# Patient Record
Sex: FEMALE | Race: BLACK OR AFRICAN AMERICAN | Employment: UNEMPLOYED | URBAN - METROPOLITAN AREA
[De-identification: names, ages, dates, MRNs, and addresses within clinical notes are randomized per-mention and may not be internally consistent; named-entity substitution may affect disease eponyms.]

---

## 2019-04-03 ENCOUNTER — HOSPITAL ENCOUNTER (EMERGENCY)
Age: 54
Discharge: HOME | End: 2019-04-03
Attending: EMERGENCY MEDICINE
Payer: MEDICARE

## 2019-04-03 ENCOUNTER — APPOINTMENT (OUTPATIENT)
Dept: GENERAL RADIOLOGY | Age: 54
End: 2019-04-03
Attending: EMERGENCY MEDICINE
Payer: MEDICARE

## 2019-04-03 VITALS
HEIGHT: 66 IN | DIASTOLIC BLOOD PRESSURE: 68 MMHG | OXYGEN SATURATION: 98 % | WEIGHT: 194 LBS | HEART RATE: 79 BPM | SYSTOLIC BLOOD PRESSURE: 126 MMHG | TEMPERATURE: 97.9 F | BODY MASS INDEX: 31.18 KG/M2 | RESPIRATION RATE: 20 BRPM

## 2019-04-03 DIAGNOSIS — L03.115 CELLULITIS OF RIGHT LOWER EXTREMITY: ICD-10-CM

## 2019-04-03 DIAGNOSIS — M79.671 RIGHT FOOT PAIN: Primary | ICD-10-CM

## 2019-04-03 PROCEDURE — 73630 X-RAY EXAM OF FOOT: CPT

## 2019-04-03 PROCEDURE — 99282 EMERGENCY DEPT VISIT SF MDM: CPT

## 2019-04-03 RX ORDER — FUROSEMIDE 40 MG/1
40 TABLET ORAL DAILY
Status: ON HOLD | COMMUNITY
End: 2021-11-19 | Stop reason: SDUPTHER

## 2019-04-03 RX ORDER — PREDNISONE 10 MG/1
10 TABLET ORAL DAILY
Status: ON HOLD | COMMUNITY
End: 2021-04-19

## 2019-04-03 RX ORDER — HYDROXYCHLOROQUINE SULFATE 200 MG/1
200 TABLET, FILM COATED ORAL 2 TIMES DAILY
Status: ON HOLD | COMMUNITY
End: 2021-11-07

## 2019-04-03 RX ORDER — TADALAFIL 20 MG/1
20 TABLET ORAL DAILY
Status: ON HOLD | COMMUNITY
End: 2021-04-19

## 2019-04-03 RX ORDER — CEPHALEXIN 500 MG/1
500 CAPSULE ORAL 3 TIMES DAILY
Qty: 21 CAP | Refills: 0 | Status: SHIPPED | OUTPATIENT
Start: 2019-04-03 | End: 2019-04-10

## 2019-04-04 NOTE — DISCHARGE INSTRUCTIONS
Patient Education        Foot Pain: Care Instructions  Your Care Instructions  Foot injuries that cause pain and swelling are fairly common. Almost all sports or home repair projects can cause a misstep that ends up as foot pain. Normal wear and tear, especially as you get older, also can cause foot pain. Most minor foot injuries will heal on their own, and home treatment is usually all you need to do. If you have a severe injury, you may need tests and treatment. Follow-up care is a key part of your treatment and safety. Be sure to make and go to all appointments, and call your doctor if you are having problems. It's also a good idea to know your test results and keep a list of the medicines you take. How can you care for yourself at home? · Take pain medicines exactly as directed. ? If the doctor gave you a prescription medicine for pain, take it as prescribed. ? If you are not taking a prescription pain medicine, ask your doctor if you can take an over-the-counter medicine. · Rest and protect your foot. Take a break from any activity that may cause pain. · Put ice or a cold pack on your foot for 10 to 20 minutes at a time. Put a thin cloth between the ice and your skin. · Prop up the sore foot on a pillow when you ice it or anytime you sit or lie down during the next 3 days. Try to keep it above the level of your heart. This will help reduce swelling. · Your doctor may recommend that you wrap your foot with an elastic bandage. Keep your foot wrapped for as long as your doctor advises. · If your doctor recommends crutches, use them as directed. · Wear roomy footwear. · As soon as pain and swelling end, begin gentle exercises of your foot. Your doctor can tell you which exercises will help. When should you call for help? Call 911 anytime you think you may need emergency care.  For example, call if:    · Your foot turns pale, white, blue, or cold.    Call your doctor now or seek immediate medical care if:    · You cannot move or stand on your foot.     · Your foot looks twisted or out of its normal position.     · Your foot is not stable when you step down.     · You have signs of infection, such as:  ? Increased pain, swelling, warmth, or redness. ? Red streaks leading from the sore area. ? Pus draining from a place on your foot. ? A fever.     · Your foot is numb or tingly.    Watch closely for changes in your health, and be sure to contact your doctor if:    · You do not get better as expected.     · You have bruises from an injury that last longer than 2 weeks. Where can you learn more? Go to http://landon-flower.info/. Enter G966 in the search box to learn more about \"Foot Pain: Care Instructions. \"  Current as of: September 20, 2018  Content Version: 11.9  © 7898-8740 [employer] Care instructions adapted under license by [employer] (which disclaims liability or warranty for this information). If you have questions about a medical condition or this instruction, always ask your healthcare professional. [place] [address] any warranty or liability for your use of this information. Patient Education        Cellulitis: Care Instructions  Your Care Instructions    Cellulitis is a skin infection caused by bacteria, most often strep or staph. It often occurs after a break in the skin from a scrape, cut, bite, or puncture, or after a rash. Cellulitis may be treated without doing tests to find out what caused it. But your doctor may do tests, if needed, to look for a specific bacteria, like methicillin-resistant Staphylococcus aureus (MRSA). The doctor has checked you carefully, but problems can develop later. If you notice any problems or new symptoms, get medical treatment right away. Follow-up care is a key part of your treatment and safety. Be sure to make and go to all appointments, and call your doctor if you are having problems.  It's also a good idea to know your test results and keep a list of the medicines you take. How can you care for yourself at home? · Take your antibiotics as directed. Do not stop taking them just because you feel better. You need to take the full course of antibiotics. · Prop up the infected area on pillows to reduce pain and swelling. Try to keep the area above the level of your heart as often as you can. · If your doctor told you how to care for your wound, follow your doctor's instructions. If you did not get instructions, follow this general advice:  ? Wash the wound with clean water 2 times a day. Don't use hydrogen peroxide or alcohol, which can slow healing. ? You may cover the wound with a thin layer of petroleum jelly, such as Vaseline, and a nonstick bandage. ? Apply more petroleum jelly and replace the bandage as needed. · Be safe with medicines. Take pain medicines exactly as directed. ? If the doctor gave you a prescription medicine for pain, take it as prescribed. ? If you are not taking a prescription pain medicine, ask your doctor if you can take an over-the-counter medicine. To prevent cellulitis in the future  · Try to prevent cuts, scrapes, or other injuries to your skin. Cellulitis most often occurs where there is a break in the skin. · If you get a scrape, cut, mild burn, or bite, wash the wound with clean water as soon as you can to help avoid infection. Don't use hydrogen peroxide or alcohol, which can slow healing. · If you have swelling in your legs (edema), support stockings and good skin care may help prevent leg sores and cellulitis. · Take care of your feet, especially if you have diabetes or other conditions that increase the risk of infection. Wear shoes and socks. Do not go barefoot. If you have athlete's foot or other skin problems on your feet, talk to your doctor about how to treat them. When should you call for help?   Call your doctor now or seek immediate medical care if:    · You have signs that your infection is getting worse, such as:  ? Increased pain, swelling, warmth, or redness. ? Red streaks leading from the area. ? Pus draining from the area. ? A fever.     · You get a rash.    Watch closely for changes in your health, and be sure to contact your doctor if:    · You do not get better as expected. Where can you learn more? Go to http://landon-flower.info/. Jorge Lagunas in the search box to learn more about \"Cellulitis: Care Instructions. \"  Current as of: April 17, 2018  Content Version: 11.9  © 1443-1560 "[employer]. Care instructions adapted under license by [employer] (which disclaims liability or warranty for this information). If you have questions about a medical condition or this instruction, always ask your healthcare professional. [address] any warranty or liability for your use of this information.

## 2019-04-04 NOTE — ED PROVIDER NOTES
Hx pulmonary HTN, SLE; presents with a one week hx right foot pain, redness, and swelling; no injury; no hx same; pain is moderate; worse with weight-bearing. No other complaints. No hx gout. Visiting from [place]. Past Medical History:   Diagnosis Date    Autoimmune disease ([facility] [address]     Lupus    Hypertension     \"pulmonary HTN\"    Ill-defined condition        Past Surgical History:   Procedure Laterality Date    HX ORTHOPAEDIC      left ankle         History reviewed. No pertinent family history. Social History     Socioeconomic History    Marital status: SINGLE     Spouse name: Not on file    Number of children: Not on file    Years of education: Not on file    Highest education level: Not on file   Occupational History    Not on file   Social Needs    Financial resource strain: Not on file    Food insecurity:     Worry: Not on file     Inability: Not on file    Transportation needs:     Medical: Not on file     Non-medical: Not on file   Tobacco Use    Smoking status: Never Smoker   Substance and Sexual Activity    Alcohol use: Never     Frequency: Never    Drug use: Never    Sexual activity: Not on file   Lifestyle    Physical activity:     Days per week: Not on file     Minutes per session: Not on file    Stress: Not on file   Relationships    Social connections:     Talks on phone: Not on file     Gets together: Not on file     Attends Rastafarian service: Not on file     Active member of club or organization: Not on file     Attends meetings of clubs or organizations: Not on file     Relationship status: Not on file    Intimate partner violence:     Fear of current or ex partner: Not on file     Emotionally abused: Not on file     Physically abused: Not on file     Forced sexual activity: Not on file   Other Topics Concern    Not on file   Social History Narrative    Not on file         ALLERGIES: Lotrel [amlodipine-benazepril]; Sudafed [pseudoephedrine hcl];  Codeine; and Doxycycline    Review of Systems   All other systems reviewed and are negative. Vitals:    04/03/19 2012   BP: 126/68   Pulse: 79   Resp: 20   Temp: 97.9 °F (36.6 °C)   SpO2: 98%   Weight: 88 kg (194 lb)   Height: 5' 6\" (1.676 m)            Physical Exam   Constitutional: She appears well-developed and well-nourished. HENT:   Head: Normocephalic and atraumatic. Eyes: Conjunctivae are normal.   Neck: No tracheal deviation present. Cardiovascular: Normal rate. Pulmonary/Chest: Effort normal.   Abdominal: She exhibits no distension. Musculoskeletal:   Right dorsal midfoot is red, swollen and tender; 2+ DP. Neurological: She is alert. Skin: Skin is dry. Psychiatric: She has a normal mood and affect. Nursing note and vitals reviewed. MDM       Procedures    Progress Note:  Results, treatment, and follow up plan have been discussed with patient. Questions were answered. Naya Pavon MD      A/P: right foot swelling, redness, tenderness - unclear etiology; will cover for cellulitis; PCP f/u.   Naya Pavon MD

## 2019-04-04 NOTE — ED TRIAGE NOTES
Pt ambulated to the treatment area with a steady gait. Pt states \"Thursday I started having pain in my right foot. No injury to the foot. Its swollen and warm. I drove here Saturday it was swollen before the car ride. \"

## 2019-04-04 NOTE — ED NOTES
Discharge note: The patient was discharged home in stable condition, accompanied by family member. The patient is alert and oriented, is in no respiratory distress and has vital signs within normal limits. The patient's diagnosis, condition and treatment were explained to patient Dr Karyle Hailey. The patient expressed understanding of discharge instructions, prescriptions, and plan of care. A work note was given to pt. A discharge plan has been developed. A  was not involved in the process. Patient offered a wheelchair to ED lob for discharge but declined at this time. Patient ambulated with a steady gate to ED lobby to go home with family member.

## 2021-04-18 ENCOUNTER — APPOINTMENT (OUTPATIENT)
Dept: CT IMAGING | Age: 56
DRG: 392 | End: 2021-04-18
Attending: STUDENT IN AN ORGANIZED HEALTH CARE EDUCATION/TRAINING PROGRAM
Payer: MEDICARE

## 2021-04-18 ENCOUNTER — APPOINTMENT (OUTPATIENT)
Dept: GENERAL RADIOLOGY | Age: 56
DRG: 392 | End: 2021-04-18
Attending: PHYSICIAN ASSISTANT
Payer: MEDICARE

## 2021-04-18 ENCOUNTER — HOSPITAL ENCOUNTER (INPATIENT)
Age: 56
LOS: 4 days | Discharge: HOME | DRG: 392 | End: 2021-04-22
Attending: STUDENT IN AN ORGANIZED HEALTH CARE EDUCATION/TRAINING PROGRAM | Admitting: INTERNAL MEDICINE
Payer: MEDICARE

## 2021-04-18 ENCOUNTER — APPOINTMENT (OUTPATIENT)
Dept: VASCULAR SURGERY | Age: 56
DRG: 392 | End: 2021-04-18
Attending: PHYSICIAN ASSISTANT
Payer: MEDICARE

## 2021-04-18 DIAGNOSIS — K29.90 GASTRITIS AND DUODENITIS: ICD-10-CM

## 2021-04-18 DIAGNOSIS — K52.9 COLITIS: Primary | ICD-10-CM

## 2021-04-18 PROBLEM — L93.0 LUPUS ERYTHEMATOSUS: Status: ACTIVE | Noted: 2021-04-18

## 2021-04-18 PROBLEM — R60.0 BILATERAL LOWER EXTREMITY EDEMA: Status: ACTIVE | Noted: 2021-04-18

## 2021-04-18 PROBLEM — I27.20 PULMONARY HYPERTENSION (HCC): Status: ACTIVE | Noted: 2021-04-18

## 2021-04-18 PROBLEM — K21.9 GERD (GASTROESOPHAGEAL REFLUX DISEASE): Status: ACTIVE | Noted: 2021-04-18

## 2021-04-18 PROBLEM — R09.89 PULMONARY HYPERINFLATION: Status: ACTIVE | Noted: 2021-04-18

## 2021-04-18 LAB
ALBUMIN SERPL-MCNC: 3.1 G/DL (ref 3.5–5)
ALBUMIN/GLOB SERPL: 0.6 {RATIO} (ref 1.1–2.2)
ALP SERPL-CCNC: 74 U/L (ref 45–117)
ALT SERPL-CCNC: 20 U/L (ref 12–78)
ANION GAP SERPL CALC-SCNC: 4 MMOL/L (ref 5–15)
APPEARANCE UR: CLEAR
AST SERPL-CCNC: 52 U/L (ref 15–37)
BACTERIA URNS QL MICRO: NEGATIVE /HPF
BASOPHILS # BLD: 0 K/UL (ref 0–0.1)
BASOPHILS NFR BLD: 1 % (ref 0–1)
BILIRUB SERPL-MCNC: 0.4 MG/DL (ref 0.2–1)
BILIRUB UR QL: NEGATIVE
BUN SERPL-MCNC: 7 MG/DL (ref 6–20)
BUN/CREAT SERPL: 11 (ref 12–20)
CALCIUM SERPL-MCNC: 8.2 MG/DL (ref 8.5–10.1)
CHLORIDE SERPL-SCNC: 105 MMOL/L (ref 97–108)
CO2 SERPL-SCNC: 27 MMOL/L (ref 21–32)
COLOR UR: NORMAL
COMMENT, HOLDF: NORMAL
COMMENT, HOLDF: NORMAL
CREAT SERPL-MCNC: 0.66 MG/DL (ref 0.55–1.02)
DIFFERENTIAL METHOD BLD: ABNORMAL
EOSINOPHIL # BLD: 0.2 K/UL (ref 0–0.4)
EOSINOPHIL NFR BLD: 7 % (ref 0–7)
EPITH CASTS URNS QL MICRO: NORMAL /LPF
ERYTHROCYTE [DISTWIDTH] IN BLOOD BY AUTOMATED COUNT: 12.8 % (ref 11.5–14.5)
GLOBULIN SER CALC-MCNC: 5 G/DL (ref 2–4)
GLUCOSE SERPL-MCNC: 82 MG/DL (ref 65–100)
GLUCOSE UR STRIP.AUTO-MCNC: NEGATIVE MG/DL
HCT VFR BLD AUTO: 32.1 % (ref 35–47)
HGB BLD-MCNC: 10.2 G/DL (ref 11.5–16)
HGB UR QL STRIP: NEGATIVE
HYALINE CASTS URNS QL MICRO: NORMAL /LPF (ref 0–5)
IMM GRANULOCYTES # BLD AUTO: 0 K/UL (ref 0–0.04)
IMM GRANULOCYTES NFR BLD AUTO: 1 % (ref 0–0.5)
KETONES UR QL STRIP.AUTO: NEGATIVE MG/DL
LEUKOCYTE ESTERASE UR QL STRIP.AUTO: NEGATIVE
LIPASE SERPL-CCNC: 76 U/L (ref 73–393)
LYMPHOCYTES # BLD: 0.7 K/UL (ref 0.8–3.5)
LYMPHOCYTES NFR BLD: 32 % (ref 12–49)
MCH RBC QN AUTO: 29.3 PG (ref 26–34)
MCHC RBC AUTO-ENTMCNC: 31.8 G/DL (ref 30–36.5)
MCV RBC AUTO: 92.2 FL (ref 80–99)
MONOCYTES # BLD: 0.1 K/UL (ref 0–1)
MONOCYTES NFR BLD: 6 % (ref 5–13)
NEUTS SEG # BLD: 1.2 K/UL (ref 1.8–8)
NEUTS SEG NFR BLD: 53 % (ref 32–75)
NITRITE UR QL STRIP.AUTO: NEGATIVE
NRBC # BLD: 0 K/UL (ref 0–0.01)
NRBC BLD-RTO: 0 PER 100 WBC
PH UR STRIP: 7 [PH] (ref 5–8)
PLATELET # BLD AUTO: 210 K/UL (ref 150–400)
PMV BLD AUTO: 9.7 FL (ref 8.9–12.9)
POTASSIUM SERPL-SCNC: 3.7 MMOL/L (ref 3.5–5.1)
PROT SERPL-MCNC: 8.1 G/DL (ref 6.4–8.2)
PROT UR STRIP-MCNC: NEGATIVE MG/DL
RBC # BLD AUTO: 3.48 M/UL (ref 3.8–5.2)
RBC #/AREA URNS HPF: NORMAL /HPF (ref 0–5)
RBC MORPH BLD: ABNORMAL
SAMPLES BEING HELD,HOLD: NORMAL
SAMPLES BEING HELD,HOLD: NORMAL
SODIUM SERPL-SCNC: 136 MMOL/L (ref 136–145)
SP GR UR REFRACTOMETRY: 1.03 (ref 1–1.03)
TROPONIN I SERPL-MCNC: <0.05 NG/ML
UR CULT HOLD, URHOLD: NORMAL
UROBILINOGEN UR QL STRIP.AUTO: 0.2 EU/DL (ref 0.2–1)
WBC # BLD AUTO: 2.2 K/UL (ref 3.6–11)
WBC URNS QL MICRO: NORMAL /HPF (ref 0–4)

## 2021-04-18 PROCEDURE — 80053 COMPREHEN METABOLIC PANEL: CPT

## 2021-04-18 PROCEDURE — 74011250636 HC RX REV CODE- 250/636: Performed by: STUDENT IN AN ORGANIZED HEALTH CARE EDUCATION/TRAINING PROGRAM

## 2021-04-18 PROCEDURE — 96375 TX/PRO/DX INJ NEW DRUG ADDON: CPT

## 2021-04-18 PROCEDURE — 74011250637 HC RX REV CODE- 250/637: Performed by: INTERNAL MEDICINE

## 2021-04-18 PROCEDURE — 74011250636 HC RX REV CODE- 250/636: Performed by: INTERNAL MEDICINE

## 2021-04-18 PROCEDURE — 93005 ELECTROCARDIOGRAM TRACING: CPT

## 2021-04-18 PROCEDURE — 74011000250 HC RX REV CODE- 250: Performed by: STUDENT IN AN ORGANIZED HEALTH CARE EDUCATION/TRAINING PROGRAM

## 2021-04-18 PROCEDURE — 81001 URINALYSIS AUTO W/SCOPE: CPT

## 2021-04-18 PROCEDURE — 85025 COMPLETE CBC W/AUTO DIFF WBC: CPT

## 2021-04-18 PROCEDURE — 84484 ASSAY OF TROPONIN QUANT: CPT

## 2021-04-18 PROCEDURE — 74177 CT ABD & PELVIS W/CONTRAST: CPT

## 2021-04-18 PROCEDURE — 73610 X-RAY EXAM OF ANKLE: CPT

## 2021-04-18 PROCEDURE — 99285 EMERGENCY DEPT VISIT HI MDM: CPT

## 2021-04-18 PROCEDURE — 93971 EXTREMITY STUDY: CPT

## 2021-04-18 PROCEDURE — 74011000636 HC RX REV CODE- 636: Performed by: EMERGENCY MEDICINE

## 2021-04-18 PROCEDURE — 87086 URINE CULTURE/COLONY COUNT: CPT

## 2021-04-18 PROCEDURE — 65270000029 HC RM PRIVATE

## 2021-04-18 PROCEDURE — 96374 THER/PROPH/DIAG INJ IV PUSH: CPT

## 2021-04-18 PROCEDURE — 83690 ASSAY OF LIPASE: CPT

## 2021-04-18 PROCEDURE — 36415 COLL VENOUS BLD VENIPUNCTURE: CPT

## 2021-04-18 RX ORDER — SODIUM CHLORIDE 9 MG/ML
75 INJECTION, SOLUTION INTRAVENOUS CONTINUOUS
Status: DISCONTINUED | OUTPATIENT
Start: 2021-04-18 | End: 2021-04-20

## 2021-04-18 RX ORDER — SENNOSIDES 8.6 MG/1
1 TABLET ORAL DAILY PRN
Status: DISCONTINUED | OUTPATIENT
Start: 2021-04-18 | End: 2021-04-22 | Stop reason: HOSPADM

## 2021-04-18 RX ORDER — MORPHINE SULFATE 4 MG/ML
4 INJECTION INTRAVENOUS
Status: COMPLETED | OUTPATIENT
Start: 2021-04-18 | End: 2021-04-18

## 2021-04-18 RX ORDER — METRONIDAZOLE 500 MG/100ML
500 INJECTION, SOLUTION INTRAVENOUS EVERY 12 HOURS
Status: DISCONTINUED | OUTPATIENT
Start: 2021-04-19 | End: 2021-04-22 | Stop reason: HOSPADM

## 2021-04-18 RX ORDER — TRAZODONE HYDROCHLORIDE 100 MG/1
300 TABLET ORAL
Status: DISCONTINUED | OUTPATIENT
Start: 2021-04-19 | End: 2021-04-20

## 2021-04-18 RX ORDER — POLYETHYLENE GLYCOL 3350 17 G/17G
17 POWDER, FOR SOLUTION ORAL DAILY PRN
Status: DISCONTINUED | OUTPATIENT
Start: 2021-04-18 | End: 2021-04-22 | Stop reason: HOSPADM

## 2021-04-18 RX ORDER — SODIUM CHLORIDE 0.9 % (FLUSH) 0.9 %
5-40 SYRINGE (ML) INJECTION EVERY 8 HOURS
Status: DISCONTINUED | OUTPATIENT
Start: 2021-04-18 | End: 2021-04-22 | Stop reason: HOSPADM

## 2021-04-18 RX ORDER — METRONIDAZOLE 500 MG/100ML
500 INJECTION, SOLUTION INTRAVENOUS
Status: COMPLETED | OUTPATIENT
Start: 2021-04-18 | End: 2021-04-18

## 2021-04-18 RX ORDER — SODIUM CHLORIDE 0.9 % (FLUSH) 0.9 %
5-40 SYRINGE (ML) INJECTION AS NEEDED
Status: DISCONTINUED | OUTPATIENT
Start: 2021-04-18 | End: 2021-04-22 | Stop reason: HOSPADM

## 2021-04-18 RX ORDER — ACETAMINOPHEN 325 MG/1
650 TABLET ORAL
Status: DISCONTINUED | OUTPATIENT
Start: 2021-04-18 | End: 2021-04-22 | Stop reason: HOSPADM

## 2021-04-18 RX ORDER — ACETAMINOPHEN 650 MG/1
650 SUPPOSITORY RECTAL
Status: DISCONTINUED | OUTPATIENT
Start: 2021-04-18 | End: 2021-04-22 | Stop reason: HOSPADM

## 2021-04-18 RX ADMIN — IOPAMIDOL 100 ML: 755 INJECTION, SOLUTION INTRAVENOUS at 15:34

## 2021-04-18 RX ADMIN — CEFTRIAXONE 1 G: 1 INJECTION, POWDER, FOR SOLUTION INTRAMUSCULAR; INTRAVENOUS at 18:32

## 2021-04-18 RX ADMIN — SODIUM CHLORIDE 1000 ML: 9 INJECTION, SOLUTION INTRAVENOUS at 17:10

## 2021-04-18 RX ADMIN — SODIUM CHLORIDE 75 ML/HR: 9 INJECTION, SOLUTION INTRAVENOUS at 22:34

## 2021-04-18 RX ADMIN — FAMOTIDINE 20 MG: 10 INJECTION, SOLUTION INTRAVENOUS at 17:10

## 2021-04-18 RX ADMIN — MORPHINE SULFATE 4 MG: 4 INJECTION, SOLUTION INTRAMUSCULAR; INTRAVENOUS at 17:10

## 2021-04-18 RX ADMIN — Medication 10 ML: at 22:34

## 2021-04-18 RX ADMIN — ACETAMINOPHEN 650 MG: 325 TABLET ORAL at 22:54

## 2021-04-18 RX ADMIN — METRONIDAZOLE 500 MG: 500 INJECTION, SOLUTION INTRAVENOUS at 18:33

## 2021-04-18 NOTE — ED NOTES
Bedside shift change report given to Rosaura Shepard (oncoming nurse) by Amie Fothergill (offgoing nurse). Report included the following information SBAR, Kardex, ED Summary, [facility] and Recent Results.

## 2021-04-18 NOTE — ED PROVIDER NOTES
55 y.o. female history of lupus and pulmonary hypertension who primary lives in [place], visiting for the next month after the death of family member presenting with epigastric and lower abdominal pain, no vomiting, diarrhea, GI bleeding. She states that she has had leukopenia in the past related to some medication that she is on for lupus. States that her pain is cramping, severe and postprandial.  No dysuria or hematuria. Has been gradually worsening. No alleviating factors. Past Medical History:   Diagnosis Date    Autoimmune disease ([facility] [address]     Lupus    Hypertension     \"pulmonary HTN\"    Ill-defined condition        Past Surgical History:   Procedure Laterality Date    HX ORTHOPAEDIC      left ankle         No family history on file.     Social History     Socioeconomic History    Marital status: SINGLE     Spouse name: Not on file    Number of children: Not on file    Years of education: Not on file    Highest education level: Not on file   Occupational History    Not on file   Social Needs    Financial resource strain: Not on file    Food insecurity     Worry: Not on file     Inability: Not on file    Transportation needs     Medical: Not on file     Non-medical: Not on file   Tobacco Use    Smoking status: Never Smoker   Substance and Sexual Activity    Alcohol use: Never     Frequency: Never    Drug use: Never    Sexual activity: Not on file   Lifestyle    Physical activity     Days per week: Not on file     Minutes per session: Not on file    Stress: Not on file   Relationships    Social connections     Talks on phone: Not on file     Gets together: Not on file     Attends Episcopal service: Not on file     Active member of club or organization: Not on file     Attends meetings of clubs or organizations: Not on file     Relationship status: Not on file    Intimate partner violence     Fear of current or ex partner: Not on file     Emotionally abused: Not on file     Physically abused: Not on file     Forced sexual activity: Not on file   Other Topics Concern    Not on file   Social History Narrative    Not on file         ALLERGIES: Lotrel [amlodipine-benazepril], Sudafed [pseudoephedrine hcl], Codeine, and Doxycycline    Review of Systems   Constitutional: Negative for chills and fever. HENT: Negative for dental problem. Eyes: Negative for photophobia and redness. Respiratory: Negative for cough and shortness of breath. Cardiovascular: Negative for chest pain and leg swelling. Gastrointestinal: Negative for abdominal pain, nausea and vomiting. Genitourinary: Negative for dysuria. Musculoskeletal: Negative for back pain. Neurological: Negative for light-headedness and headaches. Psychiatric/Behavioral: Negative for confusion. All other systems reviewed and are negative. Vitals:    04/18/21 1242 04/18/21 1628 04/18/21 1700   BP: (!) 148/83  (!) 144/80   Pulse: 80     Resp: 16     Temp: 97.3 °F (36.3 °C)     SpO2: 99% 99% 100%   Weight: 73.5 kg (162 lb)     Height: 5' 6\" (1.676 m)              Physical Exam  Vitals signs and nursing note reviewed. Constitutional:       General: She is not in acute distress. Appearance: She is well-developed. She is not toxic-appearing. HENT:      Head: Normocephalic and atraumatic. Mouth/Throat:      Mouth: Mucous membranes are moist.      Pharynx: No oropharyngeal exudate. Eyes:      Extraocular Movements: Extraocular movements intact. Pupils: Pupils are equal, round, and reactive to light. Neck:      Musculoskeletal: Normal range of motion. Cardiovascular:      Rate and Rhythm: Normal rate and regular rhythm. Heart sounds: Normal heart sounds. Pulmonary:      Effort: Pulmonary effort is normal. No respiratory distress. Breath sounds: Normal breath sounds. Chest:      Chest wall: No tenderness. Abdominal:      General: There is no distension.       Palpations: Abdomen is soft. Tenderness: There is abdominal tenderness in the right lower quadrant and epigastric area. There is no right CVA tenderness or left CVA tenderness. Musculoskeletal:         General: No deformity. Right lower leg: No edema. Left lower leg: No edema. Comments: Entire spine palpated, no tenderness or deformity. Skin:     General: Skin is warm and dry. Capillary Refill: Capillary refill takes less than 2 seconds. Findings: No erythema or rash. Neurological:      General: No focal deficit present. Mental Status: She is alert and oriented to person, place, and time. Psychiatric:         Mood and Affect: Mood normal.          MDM  Number of Diagnoses or Management Options         Procedures        PCP: None    No current facility-administered medications on file prior to encounter. Current Outpatient Medications on File Prior to Encounter   Medication Sig Dispense Refill    macitentan (OPSUMIT) 10 mg tab Take  by mouth daily.  tadalafil (CIALIS) 20 mg tablet Take 20 mg by mouth daily. Take 2 tabs daily   Indications: Pulmonary hypertention      predniSONE (DELTASONE) 10 mg tablet Take 10 mg by mouth daily.  hydroxychloroquine (PLAQUENIL) 200 mg tablet Take 200 mg by mouth two (2) times a day.  dexlansoprazole (DEXILANT PO) Take  by mouth daily.  furosemide (LASIX PO) Take 10 mg by mouth as needed (\"for swelling\"). PAST MEDICAL HISTORY:  Past Medical History:   Diagnosis Date    Autoimmune disease ([facility] [address]     Lupus    Hypertension     \"pulmonary HTN\"    Ill-defined condition         PAST SURGICAL HISTORY:   Past Surgical History:   Procedure Laterality Date    HX ORTHOPAEDIC      left ankle        FAMILY HISTORY:  No family history on file.      SOCIAL HISTORY:  Social History     Tobacco Use    Smoking status: Never Smoker   Substance Use Topics    Alcohol use: Never     Frequency: Never    Drug use: Never ALLERGIES:  Allergies   Allergen Reactions    Lotrel [Amlodipine-Benazepril] Anaphylaxis     Tongue swelling    Sudafed [Pseudoephedrine Hcl] Anaphylaxis    Codeine Other (comments)     Pt does not recall    Doxycycline Hives        LABORATORY TESTS:  Labs Reviewed   CBC WITH AUTOMATED DIFF - Abnormal; Notable for the following components:       Result Value    WBC 2.2 (*)     RBC 3.48 (*)     HGB 10.2 (*)     HCT 32.1 (*)     IMMATURE GRANULOCYTES 1 (*)     ABS. NEUTROPHILS 1.2 (*)     ABS. LYMPHOCYTES 0.7 (*)     All other components within normal limits   METABOLIC PANEL, COMPREHENSIVE - Abnormal; Notable for the following components:    Anion gap 4 (*)     BUN/Creatinine ratio 11 (*)     Calcium 8.2 (*)     AST (SGOT) 52 (*)     Albumin 3.1 (*)     Globulin 5.0 (*)     A-G Ratio 0.6 (*)     All other components within normal limits   URINE CULTURE HOLD SAMPLE   CULTURE, URINE   SAMPLES BEING HELD   LIPASE   TROPONIN I   URINALYSIS W/MICROSCOPIC       IMAGING RESULTS:  CT ABD PELV W CONT   Final Result   1. Wall thickening in the proximal ascending colon. 2. Small volume of ascites. No cirrhosis, no overt peritoneal carcinomatosis. 3. Mildly enlarged pelvic lymph nodes. 4. GE junction wall thickening. Questionable distal gastric wall thickening. 5. GI consultation is recommended for consideration of colonoscopy and upper   endoscopy. XR ANKLE LT MIN 3 V   Final Result   Soft tissue swelling.           EKG:  none completed    MEDICATIONS GIVEN:  Medications   famotidine (PF) (PEPCID) 20 mg in 0.9% sodium chloride 10 mL injection (20 mg IntraVENous Given 4/18/21 1710)   sodium chloride 0.9 % bolus infusion 1,000 mL (1,000 mL IntraVENous New Bag 4/18/21 1710)   cefTRIAXone (ROCEPHIN) 1 g in sterile water (preservative free) 10 mL IV syringe (has no administration in time range)   metroNIDAZOLE (FLAGYL) IVPB premix 500 mg (has no administration in time range)   iopamidoL (ISOVUE-370) 76 % injection 100 mL (100 mL IntraVENous Given 21 5846)   morphine injection 4 mg (4 mg IntraVENous Given 21 1710)       CONSULTS:  5:51 PM gastroenterology: Evaluated the patient, recommend endoscopy, colonoscopy, will hold off and prep for right now given her acute symptomatology, recommend admission and antibiotics  None      ED COURSE:       MEDICAL DECISION MAKIN y.o. female presents with abdominal pain  Differential diagnosis includes but not limited to: Colitis, gastritis, peptic ulcer disease, obstruction, perforated viscus    IMPRESSION:  1. Colitis    2. Gastritis and duodenitis        PLAN:  - Admit to hospitalist    Total critical care time spent exclusive of procedures:  37 minutes    Michael Pastrana MD            Perfect Serve Consult for Admission  5:49 PM    ED Room Number: ER07/07  Patient Name and age:  Joe Gutierrez 55 y.o.  female  Working Diagnosis:   1. Colitis    2.  Gastritis and duodenitis        COVID-19 Suspicion:  no  Sepsis present:  no  Reassessment needed: no  Code Status:  Full Code  Readmission: no  Isolation Requirements:  no  Recommended Level of Care:  med/surg  Department:[facility] Adult ED - [phone]

## 2021-04-18 NOTE — PROGRESS NOTES
Antimicrobial Dosing:    Pharmacy automatically adjusted metronidazole to 500 mg every 12 hours per P&T policy. Patient does not have any of the following exclusions:  -C.  Difficile  -CNS infection  -non-anaerobic infections (giardiasis, trichomonas, H. Pylori, etc)      Thank you,   Rikki Alvarado, PharmD       Contact: [phone]

## 2021-04-18 NOTE — ED TRIAGE NOTES
Pt sent by [facility] for upper abd pain starting Friday. Reports epigastric tenderness. Denies N/V/D. States that eating makes it worse. Denies fevers. Pt also reports swelling to left ankle for last 1.5 weeks. States that is is tender to touch. Hx of hardware in ankle. No new injury.

## 2021-04-18 NOTE — CONSULTS
Gastroenterology Consult     Referring Physician: Dr Annetta Vidal Date: 4/18/2021     Subjective:     Chief Complaint: abdominal pain    History of Present Illness: Tutu Duenas is a 55 y.o. female who is seen in consultation for abdominal pain and abnormal imaging. She has lupus and pulm HTN and lives in [place]. She is visiting here. Patient developed abdominal pain in the upper and lower areas in the past few days that progressively worsened. Pain was localized to epigastric region and worsened by meal intake. Lower abdo pain appeared to be separate and unrelated to meals. Crampy in nature. No associated nausea or vomiting. No diarrhea. No prior EGD. Last colo was >1 yrs ago. CT:  IMPRESSION  1. Wall thickening in the proximal ascending colon. 2. Small volume of ascites. No cirrhosis, no overt peritoneal carcinomatosis. 3. Mildly enlarged pelvic lymph nodes. 4. GE junction wall thickening. Questionable distal gastric wall thickening. 5. GI consultation is recommended for consideration of colonoscopy and upper  Endoscopy. Past Medical History:   Diagnosis Date    Autoimmune disease ([facility] [address]     Lupus    Hypertension     \"pulmonary HTN\"    Ill-defined condition      Past Surgical History:   Procedure Laterality Date    HX ORTHOPAEDIC      left ankle      No family history on file.   Social History     Tobacco Use    Smoking status: Never Smoker   Substance Use Topics    Alcohol use: Never     Frequency: Never      Allergies   Allergen Reactions    Lotrel [Amlodipine-Benazepril] Anaphylaxis     Tongue swelling    Sudafed [Pseudoephedrine Hcl] Anaphylaxis    Codeine Other (comments)     Pt does not recall    Doxycycline Hives     Current Facility-Administered Medications   Medication Dose Route Frequency    famotidine (PF) (PEPCID) 20 mg in 0.9% sodium chloride 10 mL injection  20 mg IntraVENous Q12H    cefTRIAXone (ROCEPHIN) 1 g in sterile water (preservative free) 10 mL IV syringe  1 g IntraVENous ONCE    metroNIDAZOLE (FLAGYL) IVPB premix 500 mg  500 mg IntraVENous NOW     Current Outpatient Medications   Medication Sig    macitentan (OPSUMIT) 10 mg tab Take  by mouth daily.  tadalafil (CIALIS) 20 mg tablet Take 20 mg by mouth daily. Take 2 tabs daily   Indications: Pulmonary hypertention    predniSONE (DELTASONE) 10 mg tablet Take 10 mg by mouth daily.  hydroxychloroquine (PLAQUENIL) 200 mg tablet Take 200 mg by mouth two (2) times a day.  dexlansoprazole (DEXILANT PO) Take  by mouth daily.  furosemide (LASIX PO) Take 10 mg by mouth as needed (\"for swelling\"). Review of Systems:  A detailed 10 organ review of systems is obtained with pertinent positives as listed in the History of Present Illness and Past Medical History. All others are negative. Objective:     Physical Exam:  Visit Vitals  BP (!) 169/83   Pulse 80   Temp 97.3 °F (36.3 °C)   Resp 16   Ht 5' 6\" (1.676 m)   Wt 73.5 kg (162 lb)   SpO2 100%   BMI 26.15 kg/m²        Skin:  Extremities and face reveal no rashes. No villalobos erythema. No telangiectasias on the chest wall. HEENT: Sclerae anicteric. Extra-occular muscles are intact. No oral ulcers. No abnormal pigmentation of the lips. The neck is supple. Cardiovascular: Regular rate and rhythm. No murmurs, gallops, or rubs. PMI nondisplaced. Carotids without bruits. Respiratory:  Comfortable breathing with no accessory muscle use. Clear breath sounds with no wheezes, rales, or rhonchi. GI:  Abdomen nondistended, soft, and nontender. Normal active bowel sounds. No enlargement of the liver or spleen. No masses palpable. Rectal:  Deferred  Musculoskeletal:  No pitting edema of the lower legs. Extremities have good range of motion. No costovertebral tenderness. Neurological:  Gross memory appears intact. Patient is alert and oriented. Psychiatric:  Mood appears appropriate with judgement intact.   Lymphatic:  No cervical or supraclavicular adenopathy. Lab/Data Review:  BMP:   Lab Results   Component Value Date/Time     04/18/2021 01:52 PM    K 3.7 04/18/2021 01:52 PM     04/18/2021 01:52 PM    CO2 27 04/18/2021 01:52 PM    AGAP 4 (L) 04/18/2021 01:52 PM    GLU 82 04/18/2021 01:52 PM    BUN 7 04/18/2021 01:52 PM    CREA 0.66 04/18/2021 01:52 PM    GFRAA >60 04/18/2021 01:52 PM    GFRNA >60 04/18/2021 01:52 PM     CMP:   Lab Results   Component Value Date/Time     04/18/2021 01:52 PM    K 3.7 04/18/2021 01:52 PM     04/18/2021 01:52 PM    CO2 27 04/18/2021 01:52 PM    AGAP 4 (L) 04/18/2021 01:52 PM    GLU 82 04/18/2021 01:52 PM    BUN 7 04/18/2021 01:52 PM    CREA 0.66 04/18/2021 01:52 PM    GFRAA >60 04/18/2021 01:52 PM    GFRNA >60 04/18/2021 01:52 PM    CA 8.2 (L) 04/18/2021 01:52 PM    ALB 3.1 (L) 04/18/2021 01:52 PM    TP 8.1 04/18/2021 01:52 PM    GLOB 5.0 (H) 04/18/2021 01:52 PM    AGRAT 0.6 (L) 04/18/2021 01:52 PM    ALT 20 04/18/2021 01:52 PM     CBC:   Lab Results   Component Value Date/Time    WBC 2.2 (L) 04/18/2021 01:52 PM    HGB 10.2 (L) 04/18/2021 01:52 PM    HCT 32.1 (L) 04/18/2021 01:52 PM     04/18/2021 01:52 PM     Liver Panel:   Lab Results   Component Value Date/Time    ALB 3.1 (L) 04/18/2021 01:52 PM    TP 8.1 04/18/2021 01:52 PM    GLOB 5.0 (H) 04/18/2021 01:52 PM    AGRAT 0.6 (L) 04/18/2021 01:52 PM    ALT 20 04/18/2021 01:52 PM    AP 74 04/18/2021 01:52 PM         Assessment/Plan:     Active Problems:    * No active hospital problems. *     Abdominal pain  Anemia  Lupus  Abnormal CT- thickening at GEJ and ascending colitis. Etiology unclear    PLAN:  - CLD  - empiric ATB- agree with Ceftriaxone and Flagyl  - Monitor clinical progress. Pt is feeling quite weak at this time.  Hopefully she will be ready to take a bowel prep in the next 1-2 days to schedule EGD and Colonoscopy

## 2021-04-18 NOTE — H&P
[address]  [phone]    Admission History and Physical      NAME:       Joe Gutierrez   :       1965   MRN:      [id number]     PCP:      None     Date of service:   2021     Chief  Complaint:  Acute abdominal pain x 3 days    History Of Presenting Illness:       Ms. Ivette Sharma is a 55 y.o. female who is being admitted for acute colitis. Ms. Ivette Sharma lives in [place] and came to [place] after mother's death. She presented to our Emergency Department today complaining of a now persistent generalized aching abdominal pain with no associated vomiting or diarrhea. She denies any fever or chills. No apparently triggers. A CT scan abdomen and pelvis done showed some wall thickening in the proximal ascending colon. Small volume of ascites. No cirrhosis, no overt peritoneal carcinomatosis. Mildly enlarged pelvic lymph nodes. GE junction wall thickening. Questionable distal gastric wall thickening. She will be admitted for further management. Allergies   Allergen Reactions    Lotrel [Amlodipine-Benazepril] Anaphylaxis     Tongue swelling    Sudafed [Pseudoephedrine Hcl] Anaphylaxis    Codeine Other (comments)     Pt does not recall    Doxycycline Hives       Prior to Admission medications    Medication Sig Start Date End Date Taking? Authorizing Provider   macitentan (OPSUMIT) 10 mg tab Take  by mouth daily. Robbin Valero MD   tadalafil (CIALIS) 20 mg tablet Take 20 mg by mouth daily. Take 2 tabs daily   Indications: Pulmonary hypertention    Robbin Valero MD   predniSONE (DELTASONE) 10 mg tablet Take 10 mg by mouth daily. Robbin Valero MD   hydroxychloroquine (PLAQUENIL) 200 mg tablet Take 200 mg by mouth two (2) times a day. Robbin Valero MD   dexlansoprazole (DEXILANT PO) Take  by mouth daily.     Robbin Valero MD   furosemide (LASIX PO) Take 10 mg by mouth as needed (\"for swelling\"). Other, MD Robbin       Past Medical History:   Diagnosis Date    Autoimmune disease ([facility] [address]     Lupus    Hypertension     \"pulmonary HTN\"    Ill-defined condition         Past Surgical History:   Procedure Laterality Date    HX ORTHOPAEDIC      left ankle       Social History     Tobacco Use    Smoking status: Never Smoker   Substance Use Topics    Alcohol use: Never     Frequency: Never        Family History   Problem Relation Age of Onset    Heart Disease Neg Hx         Review of Systems:    Constitutional ROS: no fever, chills, rigors or night sweats  Respiratory ROS: no cough, sputum, hemoptysis, dyspnea or pleuritic pain. Cardiovascular ROS: no chest pain, palpitations, orthopnea, PND or syncope  Endocrine ROS: no polydispsia, polyuria, heat or cold intolerance or major weight change. Gastrointestinal ROS: no dysphagia but abdominal pain, nausea. No vomiting or diarrhea    Genito-Urinary ROS: no dysuria, frequency, hematuria, retention or flank pain  Musculoskeletal ROS: no joint pain, swelling or muscular tenderness  Neurological ROS: no headache, confusion, focal weakness or any other neurological symptoms  Psychiatric ROS: no depression, anxiety, mood swings  Dermatological ROS: no rash, pruritis, or urticaria  Heme-Lymph ROS: no swollen glands, bleeding    Examination:    Constitutional:    Visit Vitals  BP (!) 169/83   Pulse 80   Temp 97.3 °F (36.3 °C)   Resp 16   Ht 5' 6\" (1.676 m)   Wt 73.5 kg (162 lb)   SpO2 100%   BMI 26.15 kg/m²         General:  Weak and ill looking patient in no acute distress  Eyes: Pink conjunctivae, PERRLA with no discharge.  Normal eye movements  Ear, Nose, Mouth & Throat: No ottorrhea, rhinorrhea, non tender sinuses, dry mucous membranes  Respiratory:  No accessory muscle use, clear breath sounds without crackles or wheezes  Cardiovascular:  No JVD or murmurs, regular and normal S1, S2 without thrills, bruits or peripheral edema. GI & :  Soft abdomen, lower abdominal tenderness, non-distended, normoactive bowel sounds with no palpable organomegaly  Heme:  No cervical or axillary adenopathy. Musculoskeletal:  No cyanosis, clubbing, atrophy or deformities  Skin:  No rashes, bruising or ulcers   Neurological: Awake and alert, speech is clear, CNs 2-12 are grossly intact and otherwise non focal  Psychiatric:  Has a good insight and is oriented x 3  ________________________________________________________________________    Data Review:    Labs:    Recent Labs     04/18/21  1352   WBC 2.2*   HGB 10.2*   HCT 32.1*        Recent Labs     04/18/21  1352      K 3.7      CO2 27   GLU 82   BUN 7   CREA 0.66   CA 8.2*   ALB 3.1*   ALT 20     No components found for: GLPOC  No results for input(s): PH, PCO2, PO2, HCO3, FIO2 in the last 72 hours. No results for input(s): INR, INREXT, INREXT in the last 72 hours. Imaging Studies:      CT scan abdomen and pelvis - reviewed as noted above     Assessment & Impression:     Ms. Jose De Jesus Gaitan is a 55 y.o. female being evaluated for:     Principal Problem:    Acute colitis (4/18/2021)    Active Problems:    Lupus erythematosus (4/18/2021)      Pulmonary hypertension (4/18/2021)      Bilateral lower extremity edema (4/18/2021)      GERD (gastroesophageal reflux disease) (4/18/2021)         Plan of management:    Acute colitis (4/18/2021) POA: unclear etiology. No diarrhea. Inflammatory vs infectious. Would check stool studies if she does develop diarrhea. In the meantime, admit to hospital. Clear liquid diet. IV fluids. Empiric IV Ceftriaxone and Metronidazole. Consult Gi for a colonoscopy     ? GERD (gastroesophageal reflux disease) (4/18/2021) POA: CT showed abnormalities on the GE junction. Start PPi. Will need an EGD.  Gi to see    Lupus erythematosus (4/18/2021) POA: Hold Plaquenil given suspected infection    Pulmonary hypertension (4/18/2021) POA: resume Opsomit once verified    Bilateral lower extremity edema (4/18/2021) POA: usually uses PRN lasix. Venous dopplers neg. Outpatient follow up and work up    Pelvic Lymphadenopathy POA: unclear etiology.  Will need follow up perhaps once she is back in [facility]     Code Status:  Full    Surrogate decision maker: Family    Risk of deterioration: high      Total time spent for the care of the patient: [facility] discussed with: Patient,  Nursing Staff and ED physician    Discussed:  Code Status, Care Plan and D/C Planning    Prophylaxis:  H2B/PPI    Probable Disposition:  Home w/Family           ___________________________________________________    Attending Physician: Zheng Glass MD

## 2021-04-19 LAB
ALBUMIN SERPL-MCNC: 3.2 G/DL (ref 3.5–5)
ALBUMIN/GLOB SERPL: 0.7 {RATIO} (ref 1.1–2.2)
ALP SERPL-CCNC: 75 U/L (ref 45–117)
ALT SERPL-CCNC: 20 U/L (ref 12–78)
ANION GAP SERPL CALC-SCNC: 6 MMOL/L (ref 5–15)
AST SERPL-CCNC: 42 U/L (ref 15–37)
ATRIAL RATE: 89 BPM
BACTERIA SPEC CULT: NORMAL
BASOPHILS # BLD: 0 K/UL (ref 0–0.1)
BASOPHILS NFR BLD: 0 % (ref 0–1)
BILIRUB SERPL-MCNC: 0.4 MG/DL (ref 0.2–1)
BUN SERPL-MCNC: 4 MG/DL (ref 6–20)
BUN/CREAT SERPL: 8 (ref 12–20)
CALCIUM SERPL-MCNC: 8.3 MG/DL (ref 8.5–10.1)
CALCULATED P AXIS, ECG09: 42 DEGREES
CALCULATED R AXIS, ECG10: -39 DEGREES
CALCULATED T AXIS, ECG11: 29 DEGREES
CHLORIDE SERPL-SCNC: 106 MMOL/L (ref 97–108)
CO2 SERPL-SCNC: 24 MMOL/L (ref 21–32)
CREAT SERPL-MCNC: 0.49 MG/DL (ref 0.55–1.02)
DIAGNOSIS, 93000: NORMAL
DIFFERENTIAL METHOD BLD: ABNORMAL
EOSINOPHIL # BLD: 0.1 K/UL (ref 0–0.4)
EOSINOPHIL NFR BLD: 6 % (ref 0–7)
ERYTHROCYTE [DISTWIDTH] IN BLOOD BY AUTOMATED COUNT: 12.9 % (ref 11.5–14.5)
GLOBULIN SER CALC-MCNC: 4.7 G/DL (ref 2–4)
GLUCOSE SERPL-MCNC: 77 MG/DL (ref 65–100)
HCT VFR BLD AUTO: 30.2 % (ref 35–47)
HGB BLD-MCNC: 9.9 G/DL (ref 11.5–16)
IMM GRANULOCYTES # BLD AUTO: 0 K/UL (ref 0–0.04)
IMM GRANULOCYTES NFR BLD AUTO: 0 % (ref 0–0.5)
LYMPHOCYTES # BLD: 0.8 K/UL (ref 0.8–3.5)
LYMPHOCYTES NFR BLD: 35 % (ref 12–49)
MAGNESIUM SERPL-MCNC: 1.6 MG/DL (ref 1.6–2.4)
MCH RBC QN AUTO: 29.6 PG (ref 26–34)
MCHC RBC AUTO-ENTMCNC: 32.8 G/DL (ref 30–36.5)
MCV RBC AUTO: 90.1 FL (ref 80–99)
MONOCYTES # BLD: 0.1 K/UL (ref 0–1)
MONOCYTES NFR BLD: 5 % (ref 5–13)
NEUTS SEG # BLD: 1.2 K/UL (ref 1.8–8)
NEUTS SEG NFR BLD: 54 % (ref 32–75)
NRBC # BLD: 0 K/UL (ref 0–0.01)
NRBC BLD-RTO: 0 PER 100 WBC
P-R INTERVAL, ECG05: 176 MS
PLATELET # BLD AUTO: 212 K/UL (ref 150–400)
PMV BLD AUTO: 9.8 FL (ref 8.9–12.9)
POTASSIUM SERPL-SCNC: 3.2 MMOL/L (ref 3.5–5.1)
PROT SERPL-MCNC: 7.9 G/DL (ref 6.4–8.2)
Q-T INTERVAL, ECG07: 408 MS
QRS DURATION, ECG06: 94 MS
QTC CALCULATION (BEZET), ECG08: 496 MS
RBC # BLD AUTO: 3.35 M/UL (ref 3.8–5.2)
SERVICE CMNT-IMP: NORMAL
SODIUM SERPL-SCNC: 136 MMOL/L (ref 136–145)
VENTRICULAR RATE, ECG03: 89 BPM
WBC # BLD AUTO: 2.3 K/UL (ref 3.6–11)

## 2021-04-19 PROCEDURE — 74011000250 HC RX REV CODE- 250: Performed by: INTERNAL MEDICINE

## 2021-04-19 PROCEDURE — C9113 INJ PANTOPRAZOLE SODIUM, VIA: HCPCS | Performed by: INTERNAL MEDICINE

## 2021-04-19 PROCEDURE — 80053 COMPREHEN METABOLIC PANEL: CPT

## 2021-04-19 PROCEDURE — 74011250637 HC RX REV CODE- 250/637: Performed by: INTERNAL MEDICINE

## 2021-04-19 PROCEDURE — 83735 ASSAY OF MAGNESIUM: CPT

## 2021-04-19 PROCEDURE — 74011250636 HC RX REV CODE- 250/636: Performed by: INTERNAL MEDICINE

## 2021-04-19 PROCEDURE — 85025 COMPLETE CBC W/AUTO DIFF WBC: CPT

## 2021-04-19 PROCEDURE — 36415 COLL VENOUS BLD VENIPUNCTURE: CPT

## 2021-04-19 PROCEDURE — 65270000029 HC RM PRIVATE

## 2021-04-19 RX ORDER — ATORVASTATIN CALCIUM 10 MG/1
10 TABLET, FILM COATED ORAL DAILY
COMMUNITY
End: 2021-11-19

## 2021-04-19 RX ORDER — POTASSIUM CHLORIDE 20 MEQ/1
20 TABLET, EXTENDED RELEASE ORAL DAILY
COMMUNITY
End: 2021-11-19

## 2021-04-19 RX ORDER — BISACODYL 5 MG
10 TABLET, DELAYED RELEASE (ENTERIC COATED) ORAL
Status: COMPLETED | OUTPATIENT
Start: 2021-04-19 | End: 2021-04-19

## 2021-04-19 RX ORDER — TRAMADOL HYDROCHLORIDE 50 MG/1
50 TABLET ORAL
Status: ON HOLD | COMMUNITY
End: 2021-04-22 | Stop reason: SDUPTHER

## 2021-04-19 RX ORDER — AMLODIPINE BESYLATE 10 MG/1
10 TABLET ORAL DAILY
Status: ON HOLD | COMMUNITY
End: 2021-11-07 | Stop reason: CLARIF

## 2021-04-19 RX ORDER — LORAZEPAM 0.5 MG/1
0.5 TABLET ORAL
Status: ON HOLD | COMMUNITY
End: 2021-11-08

## 2021-04-19 RX ORDER — ALENDRONATE SODIUM 70 MG/1
70 TABLET ORAL
Status: ON HOLD | COMMUNITY
End: 2021-11-08

## 2021-04-19 RX ORDER — POTASSIUM CHLORIDE 750 MG/1
40 TABLET, FILM COATED, EXTENDED RELEASE ORAL
Status: COMPLETED | OUTPATIENT
Start: 2021-04-19 | End: 2021-04-19

## 2021-04-19 RX ORDER — TRAZODONE HYDROCHLORIDE 150 MG/1
300 TABLET ORAL
Status: ON HOLD | COMMUNITY
End: 2021-04-22 | Stop reason: SDUPTHER

## 2021-04-19 RX ORDER — EPINEPHRINE 0.3 MG/.3ML
0.3 INJECTION SUBCUTANEOUS
Status: ON HOLD | COMMUNITY
End: 2021-11-08

## 2021-04-19 RX ORDER — HYDROCORTISONE 1 %
CREAM (GRAM) TOPICAL 2 TIMES DAILY
Status: DISCONTINUED | OUTPATIENT
Start: 2021-04-19 | End: 2021-04-22 | Stop reason: HOSPADM

## 2021-04-19 RX ADMIN — METRONIDAZOLE 500 MG: 500 INJECTION, SOLUTION INTRAVENOUS at 06:02

## 2021-04-19 RX ADMIN — BISACODYL 10 MG: 5 TABLET, COATED ORAL at 13:20

## 2021-04-19 RX ADMIN — POTASSIUM CHLORIDE 40 MEQ: 750 TABLET, FILM COATED, EXTENDED RELEASE ORAL at 10:27

## 2021-04-19 RX ADMIN — METRONIDAZOLE 500 MG: 500 INJECTION, SOLUTION INTRAVENOUS at 17:46

## 2021-04-19 RX ADMIN — WATER 1 G: 1 INJECTION INTRAMUSCULAR; INTRAVENOUS; SUBCUTANEOUS at 16:10

## 2021-04-19 RX ADMIN — BISACODYL 10 MG: 5 TABLET, COATED ORAL at 11:53

## 2021-04-19 RX ADMIN — TRAZODONE HYDROCHLORIDE 300 MG: 100 TABLET ORAL at 21:51

## 2021-04-19 RX ADMIN — ACETAMINOPHEN 650 MG: 325 TABLET ORAL at 20:43

## 2021-04-19 RX ADMIN — BISACODYL 10 MG: 5 TABLET, COATED ORAL at 16:11

## 2021-04-19 RX ADMIN — TRAZODONE HYDROCHLORIDE 300 MG: 100 TABLET ORAL at 00:13

## 2021-04-19 RX ADMIN — BISACODYL 10 MG: 5 TABLET, COATED ORAL at 10:27

## 2021-04-19 RX ADMIN — PANTOPRAZOLE SODIUM 40 MG: 40 INJECTION, POWDER, FOR SOLUTION INTRAVENOUS at 08:57

## 2021-04-19 RX ADMIN — LACTULOSE 45 ML: 20 SOLUTION ORAL at 16:10

## 2021-04-19 RX ADMIN — Medication 10 ML: at 13:21

## 2021-04-19 RX ADMIN — SODIUM CHLORIDE 75 ML/HR: 9 INJECTION, SOLUTION INTRAVENOUS at 13:20

## 2021-04-19 RX ADMIN — Medication 10 ML: at 21:51

## 2021-04-19 RX ADMIN — PANTOPRAZOLE SODIUM 40 MG: 40 INJECTION, POWDER, FOR SOLUTION INTRAVENOUS at 20:43

## 2021-04-19 RX ADMIN — LACTULOSE 45 ML: 20 SOLUTION ORAL at 13:20

## 2021-04-19 RX ADMIN — Medication 10 ML: at 06:02

## 2021-04-19 RX ADMIN — HYDROCORTISONE: 1 CREAM TOPICAL at 13:20

## 2021-04-19 RX ADMIN — HYDROCORTISONE: 1 CREAM TOPICAL at 17:46

## 2021-04-19 NOTE — PROGRESS NOTES
Gastrointestinal Progress Note    4/19/2021    Admit Date: 4/18/2021    Subjective: While she feels better her pains persist.  She has not had fever, vomiting, visible blood loss. Current Facility-Administered Medications   Medication Dose Route Frequency    bisacodyL (DULCOLAX) tablet 10 mg  10 mg Oral Q2H    hydrocortisone (CORTAID) 1 % cream   Topical BID    sodium chloride (NS) flush 5-40 mL  5-40 mL IntraVENous Q8H    sodium chloride (NS) flush 5-40 mL  5-40 mL IntraVENous PRN    acetaminophen (TYLENOL) tablet 650 mg  650 mg Oral Q6H PRN    Or    acetaminophen (TYLENOL) suppository 650 mg  650 mg Rectal Q6H PRN    polyethylene glycol (MIRALAX) packet 17 g  17 g Oral DAILY PRN    senna (SENOKOT) tablet 8.6 mg  1 Tab Oral DAILY PRN    metroNIDAZOLE (FLAGYL) IVPB premix 500 mg  500 mg IntraVENous Q12H    0.9% sodium chloride infusion  75 mL/hr IntraVENous CONTINUOUS    cefTRIAXone (ROCEPHIN) 1 g in sterile water (preservative free) 10 mL IV syringe  1 g IntraVENous Q24H    pantoprazole (PROTONIX) 40 mg in 0.9% sodium chloride 10 mL injection  40 mg IntraVENous Q12H    traZODone (DESYREL) tablet 300 mg  300 mg Oral QHS        Objective:     Blood pressure 138/72, pulse 97, temperature 98.2 °F (36.8 °C), resp. rate 16, height 5' 6\" (1.676 m), weight 73.5 kg (162 lb), SpO2 96 %.     04/19 0701 - 04/19 1900  In: 240 [P.O.:240]  Out: -     04/17 1901 - 04/19 0700  In: 920 [P.O.:160; I.V.:760]  Out: -     EXAM:  GENERAL: alert, cooperative, no distress, HEART: regular rate and rhythm, LUNGS: chest clear, no wheezing, rales, normal symmetric air entry, ABDOMEN:  Bowel sounds are normal, liver is not enlarged, spleen is not enlarged and EXTREMITY: no edema  Upper abdominal tenderness present  Data Review    Recent Results (from the past 24 hour(s))   SAMPLES BEING HELD    Collection Time: 04/18/21  1:52 PM   Result Value Ref Range    SAMPLES BEING HELD BLUE,RED,SST     COMMENT        Add-on orders for these samples will be processed based on acceptable specimen integrity and analyte stability, which may vary by analyte. CBC WITH AUTOMATED DIFF    Collection Time: 04/18/21  1:52 PM   Result Value Ref Range    WBC 2.2 (L) 3.6 - 11.0 K/uL    RBC 3.48 (L) 3.80 - 5.20 M/uL    HGB 10.2 (L) 11.5 - 16.0 g/dL    HCT 32.1 (L) 35.0 - 47.0 %    MCV 92.2 80.0 - 99.0 FL    MCH 29.3 26.0 - 34.0 PG    MCHC 31.8 30.0 - 36.5 g/dL    RDW 12.8 11.5 - 14.5 %    PLATELET 501 179 - 381 K/uL    MPV 9.7 8.9 - 12.9 FL    NRBC 0.0 0  WBC    ABSOLUTE NRBC 0.00 0.00 - 0.01 K/uL    NEUTROPHILS 53 32 - 75 %    LYMPHOCYTES 32 12 - 49 %    MONOCYTES 6 5 - 13 %    EOSINOPHILS 7 0 - 7 %    BASOPHILS 1 0 - 1 %    IMMATURE GRANULOCYTES 1 (H) 0.0 - 0.5 %    ABS. NEUTROPHILS 1.2 (L) 1.8 - 8.0 K/UL    ABS. LYMPHOCYTES 0.7 (L) 0.8 - 3.5 K/UL    ABS. MONOCYTES 0.1 0.0 - 1.0 K/UL    ABS. EOSINOPHILS 0.2 0.0 - 0.4 K/UL    ABS. BASOPHILS 0.0 0.0 - 0.1 K/UL    ABS. IMM. GRANS. 0.0 0.00 - 0.04 K/UL    DF SMEAR SCANNED      RBC COMMENTS NORMOCYTIC, NORMOCHROMIC     METABOLIC PANEL, COMPREHENSIVE    Collection Time: 04/18/21  1:52 PM   Result Value Ref Range    Sodium 136 136 - 145 mmol/L    Potassium 3.7 3.5 - 5.1 mmol/L    Chloride 105 97 - 108 mmol/L    CO2 27 21 - 32 mmol/L    Anion gap 4 (L) 5 - 15 mmol/L    Glucose 82 65 - 100 mg/dL    BUN 7 6 - 20 MG/DL    Creatinine 0.66 0.55 - 1.02 MG/DL    BUN/Creatinine ratio 11 (L) 12 - 20      GFR est AA >60 >60 ml/min/1.73m2    GFR est non-AA >60 >60 ml/min/1.73m2    Calcium 8.2 (L) 8.5 - 10.1 MG/DL    Bilirubin, total 0.4 0.2 - 1.0 MG/DL    ALT (SGPT) 20 12 - 78 U/L    AST (SGOT) 52 (H) 15 - 37 U/L    Alk.  phosphatase 74 45 - 117 U/L    Protein, total 8.1 6.4 - 8.2 g/dL    Albumin 3.1 (L) 3.5 - 5.0 g/dL    Globulin 5.0 (H) 2.0 - 4.0 g/dL    A-G Ratio 0.6 (L) 1.1 - 2.2     LIPASE    Collection Time: 04/18/21  1:52 PM   Result Value Ref Range    Lipase 76 73 - 393 U/L   TROPONIN I    Collection Time: 04/18/21  1:52 PM   Result Value Ref Range    Troponin-I, Qt. <0.05 <0.05 ng/mL   EKG, 12 LEAD, INITIAL    Collection Time: 04/18/21  4:21 PM   Result Value Ref Range    Ventricular Rate 89 BPM    Atrial Rate 89 BPM    P-R Interval 176 ms    QRS Duration 94 ms    Q-T Interval 408 ms    QTC Calculation (Bezet) 496 ms    Calculated P Axis 42 degrees    Calculated R Axis -39 degrees    Calculated T Axis 29 degrees    Diagnosis       Normal sinus rhythm  Possible Left atrial enlargement  Left axis deviation  Possible Anterolateral infarct , age undetermined  Abnormal ECG  No previous ECGs available     URINALYSIS W/MICROSCOPIC    Collection Time: 04/18/21  4:30 PM   Result Value Ref Range    Color YELLOW/STRAW      Appearance CLEAR CLEAR      Specific gravity 1.029 1.003 - 1.030      pH (UA) 7.0 5.0 - 8.0      Protein Negative NEG mg/dL    Glucose Negative NEG mg/dL    Ketone Negative NEG mg/dL    Bilirubin Negative NEG      Blood Negative NEG      Urobilinogen 0.2 0.2 - 1.0 EU/dL    Nitrites Negative NEG      Leukocyte Esterase Negative NEG      WBC 0-4 0 - 4 /hpf    RBC 0-5 0 - 5 /hpf    Epithelial cells FEW FEW /lpf    Bacteria Negative NEG /hpf    Hyaline cast 0-2 0 - 5 /lpf   URINE CULTURE HOLD SAMPLE    Collection Time: 04/18/21  4:30 PM    Specimen: Serum; Urine   Result Value Ref Range    Urine culture hold        Urine on hold in Microbiology dept for 2 days. If unpreserved urine is submitted, it cannot be used for addtional testing after 24 hours, recollection will be required. SAMPLES BEING HELD    Collection Time: 04/18/21  6:31 PM   Result Value Ref Range    SAMPLES BEING HELD 1BLDCS (PURPLE, BLUE)     COMMENT        Add-on orders for these samples will be processed based on acceptable specimen integrity and analyte stability, which may vary by analyte.    METABOLIC PANEL, COMPREHENSIVE    Collection Time: 04/19/21 12:32 AM   Result Value Ref Range    Sodium 136 136 - 145 mmol/L    Potassium 3.2 (L) 3.5 - 5.1 mmol/L    Chloride 106 97 - 108 mmol/L    CO2 24 21 - 32 mmol/L    Anion gap 6 5 - 15 mmol/L    Glucose 77 65 - 100 mg/dL    BUN 4 (L) 6 - 20 MG/DL    Creatinine 0.49 (L) 0.55 - 1.02 MG/DL    BUN/Creatinine ratio 8 (L) 12 - 20      GFR est AA >60 >60 ml/min/1.73m2    GFR est non-AA >60 >60 ml/min/1.73m2    Calcium 8.3 (L) 8.5 - 10.1 MG/DL    Bilirubin, total 0.4 0.2 - 1.0 MG/DL    ALT (SGPT) 20 12 - 78 U/L    AST (SGOT) 42 (H) 15 - 37 U/L    Alk. phosphatase 75 45 - 117 U/L    Protein, total 7.9 6.4 - 8.2 g/dL    Albumin 3.2 (L) 3.5 - 5.0 g/dL    Globulin 4.7 (H) 2.0 - 4.0 g/dL    A-G Ratio 0.7 (L) 1.1 - 2.2     MAGNESIUM    Collection Time: 04/19/21 12:32 AM   Result Value Ref Range    Magnesium 1.6 1.6 - 2.4 mg/dL   CBC WITH AUTOMATED DIFF    Collection Time: 04/19/21 12:32 AM   Result Value Ref Range    WBC 2.3 (L) 3.6 - 11.0 K/uL    RBC 3.35 (L) 3.80 - 5.20 M/uL    HGB 9.9 (L) 11.5 - 16.0 g/dL    HCT 30.2 (L) 35.0 - 47.0 %    MCV 90.1 80.0 - 99.0 FL    MCH 29.6 26.0 - 34.0 PG    MCHC 32.8 30.0 - 36.5 g/dL    RDW 12.9 11.5 - 14.5 %    PLATELET 931 151 - 534 K/uL    MPV 9.8 8.9 - 12.9 FL    NRBC 0.0 0  WBC    ABSOLUTE NRBC 0.00 0.00 - 0.01 K/uL    NEUTROPHILS 54 32 - 75 %    LYMPHOCYTES 35 12 - 49 %    MONOCYTES 5 5 - 13 %    EOSINOPHILS 6 0 - 7 %    BASOPHILS 0 0 - 1 %    IMMATURE GRANULOCYTES 0 0.0 - 0.5 %    ABS. NEUTROPHILS 1.2 (L) 1.8 - 8.0 K/UL    ABS. LYMPHOCYTES 0.8 0.8 - 3.5 K/UL    ABS. MONOCYTES 0.1 0.0 - 1.0 K/UL    ABS. EOSINOPHILS 0.1 0.0 - 0.4 K/UL    ABS. BASOPHILS 0.0 0.0 - 0.1 K/UL    ABS. IMM. GRANS. 0.0 0.00 - 0.04 K/UL    DF AUTOMATED         Assessment:     Principal Problem:    Acute colitis (4/18/2021)    Active Problems:    Lupus erythematosus (4/18/2021)      Bilateral lower extremity edema (4/18/2021)      GERD (gastroesophageal reflux disease) (4/18/2021)      Pulmonary hypertension ([facility] [address] (4/18/2021)        Plan:     1.   We will begin preparation for endoscopic examinations likely for the 21st.  2.  Lactulose

## 2021-04-19 NOTE — ED NOTES
TRANSFER - OUT REPORT:    Verbal report given to Mateus Evans RN (name) on Matthew Momin  being transferred to Medical Room 435(unit) for routine progression of care       Report consisted of patients Situation, Background, Assessment and   Recommendations(SBAR). Information from the following report(s) SBAR, Kardex, ED Summary, Intake/Output, MAR and Recent Results was reviewed with the receiving nurse. Lines:   Peripheral IV 04/18/21 Left Antecubital (Active)   Site Assessment Clean, dry, & intact 04/18/21 1406   Phlebitis Assessment 0 04/18/21 1406   Infiltration Assessment 0 04/18/21 1406   Dressing Status Clean, dry, & intact 04/18/21 1406        Opportunity for questions and clarification was provided.

## 2021-04-19 NOTE — PROGRESS NOTES
Patient developing localized rash on right shoulder. Rash looks like hives. No SOB or chest tightness/pain. MD quiros. Steroid cream to be used for treatment. Will continue to monitor.

## 2021-04-19 NOTE — PROGRESS NOTES
[facility]: MED RECONCILIATION    Comments/Recommendations:   Patient alert ane oriented for medication reconciliation, had medication list to review, states last took medications on Friday. Per patient, her outpatient MD told her to hold Opsumit due to increasing liver enzymes, the plan is to change medication. Medications added:     Atorvastatin  Trazodone  Alendronate  Lorazepam  Tramadol  Amlodipine  Potassium chloride     Medications removed:    Dexilant  Prednisone  Cialis     Medications adjusted:    Furosemide 40mg daily PRN adjusted from 10mg daily PRN    Information obtained from: patient, Rx query, medication list     Allergies: Lotrel [amlodipine-benazepril], Sudafed [pseudoephedrine hcl], Codeine, and Doxycycline    Prior to Admission Medications:     Medication Documentation Review Audit       Reviewed by Rikki Lam (Pharmacist) on 04/19/21 at 1113      Medication Sig Documenting Provider Last Dose Status Taking? alendronate (FOSAMAX) 70 mg tablet Take 70 mg by mouth every Monday. Provider, Historical 3/19/2021 Active Yes   amLODIPine (NORVASC) 10 mg tablet Take 10 mg by mouth daily. Provider, Historical 4/16/2021 Active Yes   atorvastatin (LIPITOR) 10 mg tablet Take 10 mg by mouth daily. Provider, Historical 4/16/2021 Active Yes   EPINEPHrine (EPIPEN) 0.3 mg/0.3 mL injection 0.3 mg by IntraMUSCular route once as needed for Allergic Response. Provider, Historical  Active Yes   furosemide (Lasix) 40 mg tablet Take 40 mg by mouth daily as needed (\"for swelling\"). Robbin Valero MD  Active Yes   hydroxychloroquine (PLAQUENIL) 200 mg tablet Take 200 mg by mouth two (2) times a day. Robbin Valero MD 4/16/2021 Active Yes   LORazepam (ATIVAN) 0.5 mg tablet Take 0.5 mg by mouth every six (6) hours as needed for Anxiety. Provider, Historical  Active Yes   macitentan (OPSUMIT) 10 mg tab Take 10 mg by mouth daily.  Robbin Valero MD 4/16/2021 Active Yes           Med Note (Elizabeth SWAN   Mon Apr 19, 2021 11:11 AM) Per patient, her liver enzymes have been increasing, so she was instructed by outpatient MD to hold medication as they will be changing to something else    potassium chloride (Klor-Con M20) 20 mEq tablet Take 20 mEq by mouth daily as needed. Provider, Historical  Active Yes   traMADoL (ULTRAM) 50 mg tablet Take 50 mg by mouth every six (6) hours as needed for Pain. Provider, Historical  Active Yes   traZODone (DESYREL) 150 mg tablet Take 300 mg by mouth nightly.  Provider, Historical 4/16/2021 Active Yes                    Thank you,   Rikki Alvarado, PharmD, BCPS   Contact: [phone]

## 2021-04-19 NOTE — PROGRESS NOTES
Bedside and Verbal shift change report given to David Virgen RN and Terra Gil RN (oncoming nurse) by Patsy Marroquin RN (offgoing nurse). Report included the following information SBAR, Kardex, Intake/Output, MAR, Accordion and Recent Results.

## 2021-04-19 NOTE — PROGRESS NOTES
Problem: Falls - Risk of  Goal: *Absence of Falls  Description: Document Solorzano Maple Fall Risk and appropriate interventions in the flowsheet.   Outcome: Progressing Towards Goal  Note: Fall Risk Interventions:  Mobility Interventions: Bed/chair exit alarm, Patient to call before getting OOB, Utilize walker, cane, or other assistive device              Elimination Interventions: Bed/chair exit alarm, Call light in reach, Patient to call for help with toileting needs              Problem: Patient Education: Go to Patient Education Activity  Goal: Patient/Family Education  Outcome: Progressing Towards Goal

## 2021-04-19 NOTE — PROGRESS NOTES
Luis Fernando Villarreal [facility] 79  [facility], [place], [address] [place] [address]  [phone]      Medical Progress Note       NAME: Edyta Pavon   :  1965  MRM:  [id number]    Date of service: 2021  10:10 AM       Assessment and Plan:   1. Acute colitis (2021). Inflammatory vs infectious. IV fluids. Empiric IV Ceftriaxone and Metronidazole. Consult Gi for a colonoscopy      2.  GE junction wall thickening on CT scan. Cont PPi. Will need an EGD. GI consulted      3. Lupus erythematosus (2021): Hold Plaquenil given suspected infection     4.  Pulmonary hypertension (2021): resume Opsomit once verified     5. Bilateral lower extremity edema (2021): usually uses PRN lasix. Venous dopplers neg. Outpatient follow up and work up     6.  Pelvic Lymphadenopathy POA: unclear etiology. Will need follow up perhaps once she is back in [facility]             Subjective:     Chief Complaint[de-identified] Patient was seen and examined as a follow up for acute colitis. Chart was reviewed. abdominal pain is a little better. No diarrhea     ROS:  (bold if positive, if negative)    Tolerating PT  Tolerating Diet        Objective:     Last 24hrs VS reviewed since prior progress note.  Most recent are:    Visit Vitals  /72 (BP 1 Location: Right upper arm, BP Patient Position: At rest;Lying)   Pulse 97   Temp 98.2 °F (36.8 °C)   Resp 16   Ht 5' 6\" (1.676 m)   Wt 73.5 kg (162 lb)   SpO2 96%   BMI 26.15 kg/m²     SpO2 Readings from Last 6 Encounters:   21 96%   19 98%            Intake/Output Summary (Last 24 hours) at 2021 1010  Last data filed at 2021 1005  Gross per 24 hour   Intake 1160 ml   Output --   Net 1160 ml        Physical Exam:    Gen:  Well-developed, well-nourished, in no acute distress  HEENT:  Pink conjunctivae, PERRL, hearing intact to voice, moist mucous membranes  Neck:  Supple, without masses, thyroid non-tender  Resp:  No accessory muscle use, clear breath sounds without wheezes rales or rhonchi  Card:  No murmurs, normal S1, S2 without thrills, bruits or peripheral edema  Abd:  Soft, non-tender, non-distended, normoactive bowel sounds are present, no palpable organomegaly and no detectable hernias  Lymph:  No cervical or inguinal adenopathy  Musc:  No cyanosis or clubbing  Skin:  No rashes or ulcers, skin turgor is good  Neuro:  Cranial nerves are grossly intact, no focal motor weakness, follows commands appropriately  Psych:  Good insight, oriented to person, place and time, alert  __________________________________________________________________  Medications Reviewed: (see below)  Medications:     Current Facility-Administered Medications   Medication Dose Route Frequency    bisacodyL (DULCOLAX) tablet 10 mg  10 mg Oral Q2H    potassium chloride SR (KLOR-CON 10) tablet 40 mEq  40 mEq Oral NOW    sodium chloride (NS) flush 5-40 mL  5-40 mL IntraVENous Q8H    sodium chloride (NS) flush 5-40 mL  5-40 mL IntraVENous PRN    acetaminophen (TYLENOL) tablet 650 mg  650 mg Oral Q6H PRN    Or    acetaminophen (TYLENOL) suppository 650 mg  650 mg Rectal Q6H PRN    polyethylene glycol (MIRALAX) packet 17 g  17 g Oral DAILY PRN    senna (SENOKOT) tablet 8.6 mg  1 Tab Oral DAILY PRN    metroNIDAZOLE (FLAGYL) IVPB premix 500 mg  500 mg IntraVENous Q12H    0.9% sodium chloride infusion  75 mL/hr IntraVENous CONTINUOUS    cefTRIAXone (ROCEPHIN) 1 g in sterile water (preservative free) 10 mL IV syringe  1 g IntraVENous Q24H    pantoprazole (PROTONIX) 40 mg in 0.9% sodium chloride 10 mL injection  40 mg IntraVENous Q12H    traZODone (DESYREL) tablet 300 mg  300 mg Oral QHS        Lab Data Reviewed: (see below)  Lab Review:     Recent Labs     04/19/21  0032 04/18/21  1352   WBC 2.3* 2.2*   HGB 9.9* 10.2*   HCT 30.2* 32.1*    210     Recent Labs     04/19/21  0032 04/18/21  1352    136   K 3.2* 3.7    105   CO2 24 27   GLU 77 82   BUN 4* 7   CREA 0.49* 0.66 CA 8.3* 8.2*   MG 1.6  --    ALB 3.2* 3.1*   TBILI 0.4 0.4   ALT 20 20     No results found for: GLUCPOC  No results for input(s): PH, PCO2, PO2, HCO3, FIO2 in the last 72 hours. No results for input(s): INR, INREXT in the last 72 hours. All Micro Results     Procedure Component Value Units Date/Time    CULTURE, URINE [[id number]] Collected: 04/18/21 1630    Order Status: Completed Specimen: Urine from Clean catch Updated: 04/18/21 4075    URINE CULTURE HOLD SAMPLE [[id number]] Collected: 04/18/21 1630    Order Status: Completed Specimen: Urine from Serum Updated: 04/18/21 1638     Urine culture hold       Urine on hold in Microbiology dept for 2 days. If unpreserved urine is submitted, it cannot be used for addtional testing after 24 hours, recollection will be required. I have reviewed notes of prior 24hr. Other pertinent lab: Total time spent with patient: 28 I personally reviewed chart, notes, data and current medications in the medical record. I have personally examined and treated the patient at bedside during this period.                  Care Plan discussed with: Patient, Nursing Staff and >50% of time spent in counseling and coordination of care    Discussed:  Care Plan    Prophylaxis:  Lovenox    Disposition:  Home w/Family           ___________________________________________________    Attending Physician: Winston Avery MD

## 2021-04-19 NOTE — PROGRESS NOTES
4/19/2021 4:00 PM   Reason for Admission:  Emergency -      ICD-10-CM ICD-9-CM    1. Colitis  K52.9 558.9    2. Gastritis and duodenitis  K29.90 535.50        Assessment:   [x]In person with pt   Charted address and phone numbers confirmed. Pt will be staying in [address] [place] [address] until May 2021. RUR: 12%  Risk Level: [x]Low []Moderate []High  Value-based purchasing: [] Yes [x] No  Bundle patient: [] Yes [x] No   Specify:     Advance Directive: Full Code. [x] No AD on file. [] AD on file. [] Current AD not on file. Copy requested. [] Requests AD, and referral submitted to [facility]. Healthcare Decision Maker: Lorrie Gonzalez Sister   [phone]   Qamar TammiMaria Elena Friend   [phone]   Adalberto Crystal Sister   [phone]     Assessment:    Age: 55    Sex: [] Male [x]Female     Residency: [x]Private residence []Apartment []Assisted Living []LTC []Other:       Lives With: []With spouse [x]Other family members []Underage children []Alone []Care provider []Other:    Prior functioning:  [x]Independent with ADLs and iADLS []Dependent with ADLs and iADLs []Partial dependence, Specify:     Prior DME required:  [x]None []RW []Cane []Crutches []Bedside commode []CPAP []Home O2 (Liter/Provider: ) []Nebulizer   []Shower Chair []Wheelchair []Hospital Bed []Myrna []Stair lift []Rollator []Other:    DME available: [x]None []RW []Cane []Crutches []Bedside commode []CPAP []Home O2 (Liter/Provider: ) []Nebulizer   []Shower Chair []Wheelchair []Hospital Bed []Myrna []Stair lift []Rollator []Other:    Rehab history: [x]None []Outpatient PT []Home Health (Provider/Date: ) []SNF (Provider/Date: ) []IPR (Provider/Date: ) []LTC (Provider/Date: ) []Hospice (Provider/Date: )  []Other:     Discharge Concerns: []Yes [x]No []Unknown   Describe:    Comments:      Insurer:   Insurance Information                GENERIC MEDICARE ADVANTAGE/[employer] Phone: [phone]    Subscriber: Chanell Linares Subscriber#: [id number]    Group#:  Precert#:           PCP: Mayrlu, Robbin   Address: Patient can only remember the practice name and not the phy*   Phone number: None   Current patient: [x]Yes []No   Approximate date of last visit:   Access to virtual PCP visits: [x]Yes []No  [facility] in [place], [place]. [facility] Transport: Pt's family        Transition of care plan:    [x] Home with outpatient follow-up  CM will follow.  LOUIS Flores    Care Management Interventions  PCP Verified by CM: Jabari Hernandez Signup: No  Discharge Durable Medical Equipment: No  Physical Therapy Consult: No  Occupational Therapy Consult: No  Speech Therapy Consult: No  Current Support Network: Lives with Spouse  Discharge Location  Discharge Placement: Home with family assistance

## 2021-04-19 NOTE — PROGRESS NOTES
Problem: Falls - Risk of  Goal: *Absence of Falls  Description: Document Chencho Bowdle Fall Risk and appropriate interventions in the flowsheet.   Outcome: Progressing Towards Goal  Note: Fall Risk Interventions:  Mobility Interventions: Bed/chair exit alarm, Patient to call before getting OOB, Utilize walker, cane, or other assistive device              Elimination Interventions: Bed/chair exit alarm, Call light in reach, Patient to call for help with toileting needs

## 2021-04-20 ENCOUNTER — APPOINTMENT (OUTPATIENT)
Dept: VASCULAR SURGERY | Age: 56
DRG: 392 | End: 2021-04-20
Attending: INTERNAL MEDICINE
Payer: MEDICARE

## 2021-04-20 LAB
ANION GAP SERPL CALC-SCNC: 5 MMOL/L (ref 5–15)
BASOPHILS # BLD: 0 K/UL (ref 0–0.1)
BASOPHILS NFR BLD: 0 % (ref 0–1)
BUN SERPL-MCNC: 5 MG/DL (ref 6–20)
BUN/CREAT SERPL: 8 (ref 12–20)
CALCIUM SERPL-MCNC: 8 MG/DL (ref 8.5–10.1)
CHLORIDE SERPL-SCNC: 109 MMOL/L (ref 97–108)
CO2 SERPL-SCNC: 24 MMOL/L (ref 21–32)
COVID-19 RAPID TEST, COVR: NOT DETECTED
CREAT SERPL-MCNC: 0.6 MG/DL (ref 0.55–1.02)
DIFFERENTIAL METHOD BLD: ABNORMAL
EOSINOPHIL # BLD: 0.2 K/UL (ref 0–0.4)
EOSINOPHIL NFR BLD: 11 % (ref 0–7)
ERYTHROCYTE [DISTWIDTH] IN BLOOD BY AUTOMATED COUNT: 12.8 % (ref 11.5–14.5)
GLUCOSE SERPL-MCNC: 70 MG/DL (ref 65–100)
HCT VFR BLD AUTO: 28.4 % (ref 35–47)
HGB BLD-MCNC: 8.9 G/DL (ref 11.5–16)
IMM GRANULOCYTES # BLD AUTO: 0 K/UL (ref 0–0.04)
IMM GRANULOCYTES NFR BLD AUTO: 0 % (ref 0–0.5)
LYMPHOCYTES # BLD: 0.6 K/UL (ref 0.8–3.5)
LYMPHOCYTES NFR BLD: 31 % (ref 12–49)
MAGNESIUM SERPL-MCNC: 1.8 MG/DL (ref 1.6–2.4)
MCH RBC QN AUTO: 28.5 PG (ref 26–34)
MCHC RBC AUTO-ENTMCNC: 31.3 G/DL (ref 30–36.5)
MCV RBC AUTO: 91 FL (ref 80–99)
MONOCYTES # BLD: 0.1 K/UL (ref 0–1)
MONOCYTES NFR BLD: 5 % (ref 5–13)
NEUTS SEG # BLD: 0.9 K/UL (ref 1.8–8)
NEUTS SEG NFR BLD: 53 % (ref 32–75)
NRBC # BLD: 0 K/UL (ref 0–0.01)
NRBC BLD-RTO: 0 PER 100 WBC
PHOSPHATE SERPL-MCNC: 3.2 MG/DL (ref 2.6–4.7)
PLATELET # BLD AUTO: 195 K/UL (ref 150–400)
PMV BLD AUTO: 9.3 FL (ref 8.9–12.9)
POTASSIUM SERPL-SCNC: 3.4 MMOL/L (ref 3.5–5.1)
RBC # BLD AUTO: 3.12 M/UL (ref 3.8–5.2)
RBC MORPH BLD: ABNORMAL
SODIUM SERPL-SCNC: 138 MMOL/L (ref 136–145)
SOURCE, COVRS: NORMAL
WBC # BLD AUTO: 1.8 K/UL (ref 3.6–11)
WBC MORPH BLD: ABNORMAL

## 2021-04-20 PROCEDURE — 74011250636 HC RX REV CODE- 250/636: Performed by: INTERNAL MEDICINE

## 2021-04-20 PROCEDURE — 87635 SARS-COV-2 COVID-19 AMP PRB: CPT

## 2021-04-20 PROCEDURE — 74011250637 HC RX REV CODE- 250/637: Performed by: INTERNAL MEDICINE

## 2021-04-20 PROCEDURE — C9113 INJ PANTOPRAZOLE SODIUM, VIA: HCPCS | Performed by: INTERNAL MEDICINE

## 2021-04-20 PROCEDURE — 74011000250 HC RX REV CODE- 250: Performed by: INTERNAL MEDICINE

## 2021-04-20 PROCEDURE — 93971 EXTREMITY STUDY: CPT

## 2021-04-20 PROCEDURE — 84100 ASSAY OF PHOSPHORUS: CPT

## 2021-04-20 PROCEDURE — 83735 ASSAY OF MAGNESIUM: CPT

## 2021-04-20 PROCEDURE — 85025 COMPLETE CBC W/AUTO DIFF WBC: CPT

## 2021-04-20 PROCEDURE — 80048 BASIC METABOLIC PNL TOTAL CA: CPT

## 2021-04-20 PROCEDURE — 36415 COLL VENOUS BLD VENIPUNCTURE: CPT

## 2021-04-20 PROCEDURE — 65270000029 HC RM PRIVATE

## 2021-04-20 RX ORDER — DEXTROSE, SODIUM CHLORIDE, AND POTASSIUM CHLORIDE 5; .45; .15 G/100ML; G/100ML; G/100ML
75 INJECTION INTRAVENOUS CONTINUOUS
Status: DISCONTINUED | OUTPATIENT
Start: 2021-04-20 | End: 2021-04-22 | Stop reason: HOSPADM

## 2021-04-20 RX ORDER — POTASSIUM CHLORIDE 750 MG/1
40 TABLET, FILM COATED, EXTENDED RELEASE ORAL 2 TIMES DAILY
Status: COMPLETED | OUTPATIENT
Start: 2021-04-20 | End: 2021-04-20

## 2021-04-20 RX ORDER — BISACODYL 5 MG
10 TABLET, DELAYED RELEASE (ENTERIC COATED) ORAL EVERY 4 HOURS
Status: COMPLETED | OUTPATIENT
Start: 2021-04-20 | End: 2021-04-20

## 2021-04-20 RX ORDER — MAGNESIUM CITRATE
296 SOLUTION, ORAL ORAL 2 TIMES DAILY
Status: COMPLETED | OUTPATIENT
Start: 2021-04-20 | End: 2021-04-20

## 2021-04-20 RX ADMIN — SODIUM CHLORIDE 75 ML/HR: 9 INJECTION, SOLUTION INTRAVENOUS at 05:11

## 2021-04-20 RX ADMIN — POTASSIUM CHLORIDE 40 MEQ: 750 TABLET, FILM COATED, EXTENDED RELEASE ORAL at 10:25

## 2021-04-20 RX ADMIN — LACTULOSE 45 ML: 20 SOLUTION ORAL at 10:29

## 2021-04-20 RX ADMIN — POTASSIUM CHLORIDE 40 MEQ: 750 TABLET, FILM COATED, EXTENDED RELEASE ORAL at 17:51

## 2021-04-20 RX ADMIN — Medication 10 ML: at 22:57

## 2021-04-20 RX ADMIN — METRONIDAZOLE 500 MG: 500 INJECTION, SOLUTION INTRAVENOUS at 17:53

## 2021-04-20 RX ADMIN — BISACODYL 10 MG: 5 TABLET, COATED ORAL at 10:26

## 2021-04-20 RX ADMIN — HYDROCORTISONE: 1 CREAM TOPICAL at 10:41

## 2021-04-20 RX ADMIN — Medication 10 ML: at 16:14

## 2021-04-20 RX ADMIN — MAGNESIUM CITRATE 296 ML: 1.75 LIQUID ORAL at 11:06

## 2021-04-20 RX ADMIN — LACTULOSE 45 ML: 20 SOLUTION ORAL at 13:57

## 2021-04-20 RX ADMIN — BISACODYL 10 MG: 5 TABLET, COATED ORAL at 13:58

## 2021-04-20 RX ADMIN — PANTOPRAZOLE SODIUM 40 MG: 40 INJECTION, POWDER, FOR SOLUTION INTRAVENOUS at 10:34

## 2021-04-20 RX ADMIN — HYDROCORTISONE: 1 CREAM TOPICAL at 18:13

## 2021-04-20 RX ADMIN — LACTULOSE 45 ML: 20 SOLUTION ORAL at 17:51

## 2021-04-20 RX ADMIN — TRAZODONE HYDROCHLORIDE 150 MG: 100 TABLET ORAL at 22:56

## 2021-04-20 RX ADMIN — PANTOPRAZOLE SODIUM 40 MG: 40 INJECTION, POWDER, FOR SOLUTION INTRAVENOUS at 22:56

## 2021-04-20 RX ADMIN — POTASSIUM CHLORIDE, DEXTROSE MONOHYDRATE AND SODIUM CHLORIDE 75 ML/HR: 150; 5; 450 INJECTION, SOLUTION INTRAVENOUS at 08:46

## 2021-04-20 RX ADMIN — METRONIDAZOLE 500 MG: 500 INJECTION, SOLUTION INTRAVENOUS at 05:53

## 2021-04-20 RX ADMIN — CEFTRIAXONE 2 G: 2 INJECTION, POWDER, FOR SOLUTION INTRAMUSCULAR; INTRAVENOUS at 16:14

## 2021-04-20 RX ADMIN — Medication 10 ML: at 05:11

## 2021-04-20 RX ADMIN — MAGNESIUM CITRATE 296 ML: 1.75 LIQUID ORAL at 17:51

## 2021-04-20 NOTE — PROGRESS NOTES
Gastrointestinal Progress Note    4/20/2021    Admit Date: 4/18/2021    Subjective:     Pains persist but continue to improve. Does not have vomiting; no visible blood loss. Hypokalemia noted; this will likely decline further with additional bowel preparation unless there is additional supplements given. Current Facility-Administered Medications   Medication Dose Route Frequency    lactulose (CHRONULAC) 10 gram/15 mL solution 45 mL  45 mL Oral Q3H    potassium chloride SR (KLOR-CON 10) tablet 40 mEq  40 mEq Oral BID    dextrose 5% - 0.45% NaCl with KCl 20 mEq/L infusion  75 mL/hr IntraVENous CONTINUOUS    hydrocortisone (CORTAID) 1 % cream   Topical BID    sodium chloride (NS) flush 5-40 mL  5-40 mL IntraVENous Q8H    sodium chloride (NS) flush 5-40 mL  5-40 mL IntraVENous PRN    acetaminophen (TYLENOL) tablet 650 mg  650 mg Oral Q6H PRN    Or    acetaminophen (TYLENOL) suppository 650 mg  650 mg Rectal Q6H PRN    polyethylene glycol (MIRALAX) packet 17 g  17 g Oral DAILY PRN    senna (SENOKOT) tablet 8.6 mg  1 Tab Oral DAILY PRN    metroNIDAZOLE (FLAGYL) IVPB premix 500 mg  500 mg IntraVENous Q12H    cefTRIAXone (ROCEPHIN) 1 g in sterile water (preservative free) 10 mL IV syringe  1 g IntraVENous Q24H    pantoprazole (PROTONIX) 40 mg in 0.9% sodium chloride 10 mL injection  40 mg IntraVENous Q12H    traZODone (DESYREL) tablet 300 mg  300 mg Oral QHS        Objective:     Blood pressure 127/78, pulse 85, temperature 97.6 °F (36.4 °C), resp. rate 16, height 5' 6\" (1.676 m), weight 73.5 kg (162 lb), SpO2 99 %. No intake/output data recorded. 04/18 1901 - 04/20 0700  In: 3047.5 [P.O.:1120;  I.V.:1927.5]  Out: -     EXAM:  GENERAL: alert, cooperative, no distress, HEART: regular rate and rhythm, LUNGS: chest clear, no wheezing, rales, normal symmetric air entry, ABDOMEN:  Bowel sounds are normal, liver is not enlarged, spleen is not enlarged and EXTREMITY: no edema      Data Review    Recent Results (from the past 24 hour(s))   CBC WITH AUTOMATED DIFF    Collection Time: 04/20/21  6:30 AM   Result Value Ref Range    WBC 1.8 (L) 3.6 - 11.0 K/uL    RBC 3.12 (L) 3.80 - 5.20 M/uL    HGB 8.9 (L) 11.5 - 16.0 g/dL    HCT 28.4 (L) 35.0 - 47.0 %    MCV 91.0 80.0 - 99.0 FL    MCH 28.5 26.0 - 34.0 PG    MCHC 31.3 30.0 - 36.5 g/dL    RDW 12.8 11.5 - 14.5 %    PLATELET 457 051 - 316 K/uL    MPV 9.3 8.9 - 12.9 FL    NRBC 0.0 0  WBC    ABSOLUTE NRBC 0.00 0.00 - 0.01 K/uL    NEUTROPHILS 53 32 - 75 %    LYMPHOCYTES 31 12 - 49 %    MONOCYTES 5 5 - 13 %    EOSINOPHILS 11 (H) 0 - 7 %    BASOPHILS 0 0 - 1 %    IMMATURE GRANULOCYTES 0 0.0 - 0.5 %    ABS. NEUTROPHILS 0.9 (L) 1.8 - 8.0 K/UL    ABS. LYMPHOCYTES 0.6 (L) 0.8 - 3.5 K/UL    ABS. MONOCYTES 0.1 0.0 - 1.0 K/UL    ABS. EOSINOPHILS 0.2 0.0 - 0.4 K/UL    ABS. BASOPHILS 0.0 0.0 - 0.1 K/UL    ABS. IMM.  GRANS. 0.0 0.00 - 0.04 K/UL    DF MANUAL      RBC COMMENTS NORMOCYTIC, NORMOCHROMIC      WBC COMMENTS REACTIVE LYMPHS     METABOLIC PANEL, BASIC    Collection Time: 04/20/21  6:30 AM   Result Value Ref Range    Sodium 138 136 - 145 mmol/L    Potassium 3.4 (L) 3.5 - 5.1 mmol/L    Chloride 109 (H) 97 - 108 mmol/L    CO2 24 21 - 32 mmol/L    Anion gap 5 5 - 15 mmol/L    Glucose 70 65 - 100 mg/dL    BUN 5 (L) 6 - 20 MG/DL    Creatinine 0.60 0.55 - 1.02 MG/DL    BUN/Creatinine ratio 8 (L) 12 - 20      GFR est AA >60 >60 ml/min/1.73m2    GFR est non-AA >60 >60 ml/min/1.73m2    Calcium 8.0 (L) 8.5 - 10.1 MG/DL   MAGNESIUM    Collection Time: 04/20/21  6:30 AM   Result Value Ref Range    Magnesium 1.8 1.6 - 2.4 mg/dL   PHOSPHORUS    Collection Time: 04/20/21  6:30 AM   Result Value Ref Range    Phosphorus 3.2 2.6 - 4.7 MG/DL       Assessment:     Principal Problem:    Acute colitis (4/18/2021)    Active Problems:    Lupus erythematosus (4/18/2021)      Bilateral lower extremity edema (4/18/2021)      GERD (gastroesophageal reflux disease) (4/18/2021)      Pulmonary hypertension ([address] (4/18/2021)    Abnormal CT scan with findings in the colon, upper GI tract. Plan:     1. I've discussed EGD, colonoscopy possible biopsy, polypectomy, cautery, injection, alternatives, complications including but not limited to pain, cardiopulmonary event, bleeding, perforation requiring additional blood transfusion or operative repair; all questions answered.

## 2021-04-20 NOTE — PROGRESS NOTES
Bedside shift change report given to George(oncoming nurse) by Afshan Snow (offgoing nurse). Report included the following information SBAR, Kardex, MAR and Recent Results.

## 2021-04-20 NOTE — PROGRESS NOTES
1225 - Patient reported some swelling in her left upper arm. RN assessed and noted left upper arm is slightly larger than her right upper arm. Patient denied any numbness or tingling or pain. MD notified and ordered duplex doppler. 1635 - MD recommended warm compresses for now and remove any IVs in that arm. IV removed. New IV placed in right forearm.

## 2021-04-20 NOTE — PROGRESS NOTES
Transition of Care Plan:  RUR-14%  1. Family to transport home at d/c  2. EGD/colonoscopy to be scheduled  3. GI following  4. CM following for any needs prior to d/c  PAO Holder

## 2021-04-20 NOTE — PROGRESS NOTES
Luis Fernando Villarreal [facility] 79  [address] [facility], [address] [place] [address]  [phone]      Medical Progress Note      NAME: Ira May   :  1965  MRM:  [id number]    Date/Time of service: 2021  1:05 PM       Subjective:     Chief Complaint:  Patient was personally seen and examined by me during this time period. Chart reviewed. abd pain is improving. Awaiting EGD/colon        Objective:       Vitals:       Last 24hrs VS reviewed since prior progress note.  Most recent are:    Visit Vitals  /72 (BP 1 Location: Right upper arm)   Pulse 86   Temp 98.7 °F (37.1 °C)   Resp 16   Ht 5' 6\" (1.676 m)   Wt 73.5 kg (162 lb)   SpO2 97%   BMI 26.15 kg/m²     SpO2 Readings from Last 6 Encounters:   21 97%   19 98%            Intake/Output Summary (Last 24 hours) at 2021 1305  Last data filed at 2021 0251  Gross per 24 hour   Intake 1887.5 ml   Output --   Net 1887.5 ml        Exam:     Physical Exam:    Gen:  Well-developed, well-nourished, in no acute distress  HEENT:  Pink conjunctivae, PERRL, hearing intact to voice, moist mucous membranes  Neck:  Supple, without masses, thyroid non-tender  Resp:  No accessory muscle use, clear breath sounds without wheezes rales or rhonchi  Card:  1/6 murmurs, normal S1, S2 without thrills, bruits or peripheral edema  Abd:  Soft, mild abd pain, +BS  Musc:  No cyanosis or clubbing  Skin:  No rashes   Neuro:  Cranial nerves 3-12 are grossly intact, follows commands appropriately  Psych:  Good insight, oriented to person, place and time, alert    Medications Reviewed: (see below)    Lab Data Reviewed: (see below)    ______________________________________________________________________    Medications:     Current Facility-Administered Medications   Medication Dose Route Frequency    lactulose (CHRONULAC) 10 gram/15 mL solution 45 mL  45 mL Oral Q3H    potassium chloride SR (KLOR-CON 10) tablet 40 mEq  40 mEq Oral BID    dextrose 5% - 0.45% NaCl with KCl 20 mEq/L infusion  75 mL/hr IntraVENous CONTINUOUS    bisacodyL (DULCOLAX) tablet 10 mg  10 mg Oral Q4H    magnesium citrate solution 296 mL  296 mL Oral BID    traZODone (DESYREL) tablet 150 mg  150 mg Oral QHS    cefTRIAXone (ROCEPHIN) 2 g in sterile water (preservative free) 20 mL IV syringe  2 g IntraVENous Q24H    hydrocortisone (CORTAID) 1 % cream   Topical BID    sodium chloride (NS) flush 5-40 mL  5-40 mL IntraVENous Q8H    sodium chloride (NS) flush 5-40 mL  5-40 mL IntraVENous PRN    acetaminophen (TYLENOL) tablet 650 mg  650 mg Oral Q6H PRN    Or    acetaminophen (TYLENOL) suppository 650 mg  650 mg Rectal Q6H PRN    polyethylene glycol (MIRALAX) packet 17 g  17 g Oral DAILY PRN    senna (SENOKOT) tablet 8.6 mg  1 Tab Oral DAILY PRN    metroNIDAZOLE (FLAGYL) IVPB premix 500 mg  500 mg IntraVENous Q12H    pantoprazole (PROTONIX) 40 mg in 0.9% sodium chloride 10 mL injection  40 mg IntraVENous Q12H          Lab Review:     Recent Labs     04/20/21  0630 04/19/21  0032 04/18/21  1352   WBC 1.8* 2.3* 2.2*   HGB 8.9* 9.9* 10.2*   HCT 28.4* 30.2* 32.1*    212 210     Recent Labs     04/20/21  0630 04/19/21  0032 04/18/21  1352    136 136   K 3.4* 3.2* 3.7   * 106 105   CO2 24 24 27   GLU 70 77 82   BUN 5* 4* 7   CREA 0.60 0.49* 0.66   CA 8.0* 8.3* 8.2*   MG 1.8 1.6  --    PHOS 3.2  --   --    ALB  --  3.2* 3.1*   TBILI  --  0.4 0.4   ALT  --  20 20     No results found for: GLUCPOC       Assessment / Plan:     56 yo hx of pulm HTN, SLE, presented w/ abd pain, gastric wall thickening, abd lymphadenopathy, acute colitis     1) Acute colitis/abd pain: slowly improving. Cont IV CTX/Flagyl. Use IV dilaudid prn severe pain. GI following, awaiting EGD/colon    2) Gastric wall thickening: awaiting EGD. Cont IV PPI    3) Pelvic lymphadenopathy: unclear etiology. Can f/u with PCP on [place]     4) Pulm HTN: cont home Opsumit once able to take PO.   F/u with Pulm in [place]    5) SLE: holding Plaquenil. F/u with Rheum in [place]    6) BLE edema: LE dopplers neg DVT. Likely related to pulm HTN.   Cont lasix prn    Total time spent with patient: 35 min **I personally saw and examined the patient during this time period**                 Care Plan discussed with: Patient, nursing     Discussed:  Care Plan    Prophylaxis:  SCD's    Disposition:  Home w/Family           ___________________________________________________    Attending Physician: Miriam Gonzalez MD

## 2021-04-20 NOTE — PROGRESS NOTES
Bedside and Verbal shift change report given to  and DEVANTE Lin and DEVANTE Sánchez (oncoming nurse) by Frank Flower (offgoing nurse). Report included the following information SBAR, Kardex, Intake/Output, MAR, Accordion and Recent Results.

## 2021-04-20 NOTE — PROGRESS NOTES
Problem: Falls - Risk of  Goal: *Absence of Falls  Description: Document Burrell Canavan Fall Risk and appropriate interventions in the flowsheet.   Outcome: Progressing Towards Goal  Note: Fall Risk Interventions:  Mobility Interventions: Bed/chair exit alarm              Elimination Interventions: Call light in reach, Bed/chair exit alarm

## 2021-04-20 NOTE — PROGRESS NOTES
Left UE venous duplex completed. Final results to follow. Verbal preliminary report given to patient's nurse.

## 2021-04-21 ENCOUNTER — ANESTHESIA (OUTPATIENT)
Dept: ENDOSCOPY | Age: 56
DRG: 392 | End: 2021-04-21
Payer: MEDICARE

## 2021-04-21 ENCOUNTER — ANESTHESIA EVENT (OUTPATIENT)
Dept: ENDOSCOPY | Age: 56
DRG: 392 | End: 2021-04-21
Payer: MEDICARE

## 2021-04-21 LAB
ANION GAP SERPL CALC-SCNC: 8 MMOL/L (ref 5–15)
BUN SERPL-MCNC: 2 MG/DL (ref 6–20)
BUN/CREAT SERPL: 3 (ref 12–20)
CALCIUM SERPL-MCNC: 7.6 MG/DL (ref 8.5–10.1)
CHLORIDE SERPL-SCNC: 113 MMOL/L (ref 97–108)
CO2 SERPL-SCNC: 15 MMOL/L (ref 21–32)
CREAT SERPL-MCNC: 0.59 MG/DL (ref 0.55–1.02)
ERYTHROCYTE [DISTWIDTH] IN BLOOD BY AUTOMATED COUNT: 12.9 % (ref 11.5–14.5)
GLUCOSE SERPL-MCNC: 73 MG/DL (ref 65–100)
HCT VFR BLD AUTO: 33.5 % (ref 35–47)
HGB BLD-MCNC: 10.2 G/DL (ref 11.5–16)
MAGNESIUM SERPL-MCNC: 1.7 MG/DL (ref 1.6–2.4)
MCH RBC QN AUTO: 28.9 PG (ref 26–34)
MCHC RBC AUTO-ENTMCNC: 30.4 G/DL (ref 30–36.5)
MCV RBC AUTO: 94.9 FL (ref 80–99)
NRBC # BLD: 0 K/UL (ref 0–0.01)
NRBC BLD-RTO: 0 PER 100 WBC
PHOSPHATE SERPL-MCNC: 2.4 MG/DL (ref 2.6–4.7)
PLATELET # BLD AUTO: 218 K/UL (ref 150–400)
PMV BLD AUTO: 9.7 FL (ref 8.9–12.9)
POTASSIUM SERPL-SCNC: 3.6 MMOL/L (ref 3.5–5.1)
RBC # BLD AUTO: 3.53 M/UL (ref 3.8–5.2)
SODIUM SERPL-SCNC: 136 MMOL/L (ref 136–145)
WBC # BLD AUTO: 2.1 K/UL (ref 3.6–11)

## 2021-04-21 PROCEDURE — C9113 INJ PANTOPRAZOLE SODIUM, VIA: HCPCS | Performed by: INTERNAL MEDICINE

## 2021-04-21 PROCEDURE — 74011250637 HC RX REV CODE- 250/637: Performed by: HOSPITALIST

## 2021-04-21 PROCEDURE — 74011250636 HC RX REV CODE- 250/636: Performed by: INTERNAL MEDICINE

## 2021-04-21 PROCEDURE — 76040000019: Performed by: INTERNAL MEDICINE

## 2021-04-21 PROCEDURE — 84100 ASSAY OF PHOSPHORUS: CPT

## 2021-04-21 PROCEDURE — 74011250636 HC RX REV CODE- 250/636: Performed by: NURSE ANESTHETIST, CERTIFIED REGISTERED

## 2021-04-21 PROCEDURE — 74011000250 HC RX REV CODE- 250: Performed by: NURSE ANESTHETIST, CERTIFIED REGISTERED

## 2021-04-21 PROCEDURE — 76060000031 HC ANESTHESIA FIRST 0.5 HR: Performed by: INTERNAL MEDICINE

## 2021-04-21 PROCEDURE — 85027 COMPLETE CBC AUTOMATED: CPT

## 2021-04-21 PROCEDURE — 83735 ASSAY OF MAGNESIUM: CPT

## 2021-04-21 PROCEDURE — 74011250637 HC RX REV CODE- 250/637: Performed by: INTERNAL MEDICINE

## 2021-04-21 PROCEDURE — 74011000250 HC RX REV CODE- 250: Performed by: INTERNAL MEDICINE

## 2021-04-21 PROCEDURE — 65270000029 HC RM PRIVATE

## 2021-04-21 PROCEDURE — 80048 BASIC METABOLIC PNL TOTAL CA: CPT

## 2021-04-21 PROCEDURE — 0DJ08ZZ INSPECTION OF UPPER INTESTINAL TRACT, VIA NATURAL OR ARTIFICIAL OPENING ENDOSCOPIC: ICD-10-PCS | Performed by: INTERNAL MEDICINE

## 2021-04-21 PROCEDURE — 2709999900 HC NON-CHARGEABLE SUPPLY: Performed by: INTERNAL MEDICINE

## 2021-04-21 PROCEDURE — 0DJD8ZZ INSPECTION OF LOWER INTESTINAL TRACT, VIA NATURAL OR ARTIFICIAL OPENING ENDOSCOPIC: ICD-10-PCS | Performed by: INTERNAL MEDICINE

## 2021-04-21 PROCEDURE — 36415 COLL VENOUS BLD VENIPUNCTURE: CPT

## 2021-04-21 RX ORDER — PROPOFOL 10 MG/ML
INJECTION, EMULSION INTRAVENOUS
Status: DISCONTINUED | OUTPATIENT
Start: 2021-04-21 | End: 2021-04-21 | Stop reason: HOSPADM

## 2021-04-21 RX ORDER — PROPOFOL 10 MG/ML
INJECTION, EMULSION INTRAVENOUS AS NEEDED
Status: DISCONTINUED | OUTPATIENT
Start: 2021-04-21 | End: 2021-04-21 | Stop reason: HOSPADM

## 2021-04-21 RX ORDER — ATROPINE SULFATE 0.1 MG/ML
0.5 INJECTION INTRAVENOUS
Status: DISCONTINUED | OUTPATIENT
Start: 2021-04-21 | End: 2021-04-21 | Stop reason: HOSPADM

## 2021-04-21 RX ORDER — FENTANYL CITRATE 50 UG/ML
100 INJECTION, SOLUTION INTRAMUSCULAR; INTRAVENOUS
Status: DISCONTINUED | OUTPATIENT
Start: 2021-04-21 | End: 2021-04-21 | Stop reason: HOSPADM

## 2021-04-21 RX ORDER — SODIUM CHLORIDE 9 MG/ML
50 INJECTION, SOLUTION INTRAVENOUS CONTINUOUS
Status: DISPENSED | OUTPATIENT
Start: 2021-04-21 | End: 2021-04-21

## 2021-04-21 RX ORDER — MIDAZOLAM HYDROCHLORIDE 1 MG/ML
.25-5 INJECTION, SOLUTION INTRAMUSCULAR; INTRAVENOUS
Status: DISCONTINUED | OUTPATIENT
Start: 2021-04-21 | End: 2021-04-21 | Stop reason: HOSPADM

## 2021-04-21 RX ORDER — FLUMAZENIL 0.1 MG/ML
0.2 INJECTION INTRAVENOUS
Status: DISCONTINUED | OUTPATIENT
Start: 2021-04-21 | End: 2021-04-21 | Stop reason: HOSPADM

## 2021-04-21 RX ORDER — NALOXONE HYDROCHLORIDE 0.4 MG/ML
0.4 INJECTION, SOLUTION INTRAMUSCULAR; INTRAVENOUS; SUBCUTANEOUS
Status: DISCONTINUED | OUTPATIENT
Start: 2021-04-21 | End: 2021-04-21 | Stop reason: HOSPADM

## 2021-04-21 RX ORDER — MAG HYDROX/ALUMINUM HYD/SIMETH 200-200-20
30 SUSPENSION, ORAL (FINAL DOSE FORM) ORAL
Status: DISCONTINUED | OUTPATIENT
Start: 2021-04-21 | End: 2021-04-22 | Stop reason: HOSPADM

## 2021-04-21 RX ORDER — DEXTROMETHORPHAN/PSEUDOEPHED 2.5-7.5/.8
1.2 DROPS ORAL
Status: DISCONTINUED | OUTPATIENT
Start: 2021-04-21 | End: 2021-04-21 | Stop reason: HOSPADM

## 2021-04-21 RX ORDER — LIDOCAINE HYDROCHLORIDE 20 MG/ML
INJECTION, SOLUTION EPIDURAL; INFILTRATION; INTRACAUDAL; PERINEURAL AS NEEDED
Status: DISCONTINUED | OUTPATIENT
Start: 2021-04-21 | End: 2021-04-21 | Stop reason: HOSPADM

## 2021-04-21 RX ORDER — EPINEPHRINE 0.1 MG/ML
1 INJECTION INTRACARDIAC; INTRAVENOUS
Status: DISCONTINUED | OUTPATIENT
Start: 2021-04-21 | End: 2021-04-21 | Stop reason: HOSPADM

## 2021-04-21 RX ORDER — SODIUM CHLORIDE 9 MG/ML
INJECTION, SOLUTION INTRAVENOUS
Status: DISCONTINUED | OUTPATIENT
Start: 2021-04-21 | End: 2021-04-21 | Stop reason: HOSPADM

## 2021-04-21 RX ADMIN — CEFTRIAXONE 2 G: 2 INJECTION, POWDER, FOR SOLUTION INTRAMUSCULAR; INTRAVENOUS at 18:29

## 2021-04-21 RX ADMIN — Medication 10 ML: at 06:33

## 2021-04-21 RX ADMIN — LIDOCAINE HYDROCHLORIDE 20 MG: 20 INJECTION, SOLUTION INTRAVENOUS at 13:35

## 2021-04-21 RX ADMIN — POTASSIUM CHLORIDE, DEXTROSE MONOHYDRATE AND SODIUM CHLORIDE 75 ML/HR: 150; 5; 450 INJECTION, SOLUTION INTRAVENOUS at 20:20

## 2021-04-21 RX ADMIN — ACETAMINOPHEN 650 MG: 325 TABLET ORAL at 23:22

## 2021-04-21 RX ADMIN — POTASSIUM PHOSPHATE, MONOBASIC AND POTASSIUM PHOSPHATE, DIBASIC: 224; 236 INJECTION, SOLUTION, CONCENTRATE INTRAVENOUS at 11:31

## 2021-04-21 RX ADMIN — PROPOFOL INJECTABLE EMULSION 70 MG: 10 INJECTION, EMULSION INTRAVENOUS at 13:34

## 2021-04-21 RX ADMIN — METRONIDAZOLE 500 MG: 500 INJECTION, SOLUTION INTRAVENOUS at 06:07

## 2021-04-21 RX ADMIN — LIDOCAINE HYDROCHLORIDE 60 MG: 20 INJECTION, SOLUTION INTRAVENOUS at 13:33

## 2021-04-21 RX ADMIN — PROPOFOL INJECTABLE EMULSION 100 MCG/KG/MIN: 10 INJECTION, EMULSION INTRAVENOUS at 13:33

## 2021-04-21 RX ADMIN — PANTOPRAZOLE SODIUM 40 MG: 40 INJECTION, POWDER, FOR SOLUTION INTRAVENOUS at 11:30

## 2021-04-21 RX ADMIN — SODIUM CHLORIDE: 9 INJECTION, SOLUTION INTRAVENOUS at 13:10

## 2021-04-21 RX ADMIN — ALUMINUM HYDROXIDE, MAGNESIUM HYDROXIDE, AND SIMETHICONE 30 ML: 200; 200; 20 SUSPENSION ORAL at 22:49

## 2021-04-21 RX ADMIN — PANTOPRAZOLE SODIUM 40 MG: 40 INJECTION, POWDER, FOR SOLUTION INTRAVENOUS at 20:20

## 2021-04-21 RX ADMIN — METRONIDAZOLE 500 MG: 500 INJECTION, SOLUTION INTRAVENOUS at 18:29

## 2021-04-21 RX ADMIN — HYDROCORTISONE: 1 CREAM TOPICAL at 18:29

## 2021-04-21 RX ADMIN — LIDOCAINE HYDROCHLORIDE 20 MG: 20 INJECTION, SOLUTION INTRAVENOUS at 13:38

## 2021-04-21 RX ADMIN — ACETAMINOPHEN 650 MG: 325 TABLET ORAL at 06:07

## 2021-04-21 RX ADMIN — PROPOFOL INJECTABLE EMULSION 30 MG: 10 INJECTION, EMULSION INTRAVENOUS at 13:36

## 2021-04-21 RX ADMIN — TRAZODONE HYDROCHLORIDE 150 MG: 100 TABLET ORAL at 22:10

## 2021-04-21 RX ADMIN — Medication 10 ML: at 22:11

## 2021-04-21 NOTE — PERIOP NOTES
Lizbeth Wes  1965  [id number]    Situation:    Scheduled Procedure: Procedure(s):  ESOPHAGOGASTRODUODENOSCOPY (EGD)  COLONOSCOPY  Verbal report received from: Kaela Paul, RN  Preoperative diagnosis: abdominal pain    Background:    Procedure: Procedure(s):  ESOPHAGOGASTRODUODENOSCOPY (EGD)  COLONOSCOPY  Physician performing procedure; Dr. Brian Varghese RN    NPO Status/Last PO Intake: 4/21/2021 0001    Pregnancy Test:No If yes, result: none    Is the patient taking Blood Thinners: NO   Is the patient diabetic:no             Does the patient have a Pacemaker/Defibrillator in place?: no   Does the patient need antibiotics before/during/after procedure: no   If the patient is having a colon, How much prep was drank? 100   What were the Colon prep results? Clear liquid yellow   Does the patient have SCD in place:no   Is patient on CONTACT precautions:no           Assessment:  Are the vital signs stable prior to patient coming to ENDO?  yes  Is the patient alert/oriented and able to sign consent for the procedures:yes  How does the patient's abdomen feel prior to coming to ENDO? Will assess in endo  Does the patient have a patient IV in place?  No PICC team working on IV @1050 advised if unable to obtain to call endo     Recommendation:  Family or Friend present no     Permission to share finding with Family or Friend yes and n/a

## 2021-04-21 NOTE — PROGRESS NOTES
Bedside and Verbal shift change report given to 1812 Abbi Renae (oncoming nurse) by Yanely Pedro (offgoing nurse). Report included the following information SBAR, Kardex, Intake/Output, MAR, Recent Results and Med Rec Status.

## 2021-04-21 NOTE — PERIOP NOTES
SBAR received from [employer]. Confirmed with floor nurse Marianela Dupont that patient has an IV; advised #02 Right Basilic IV in place.

## 2021-04-21 NOTE — PERIOP NOTES
1333 Procedure being performed under MAC; DARSHANA Morrow at bedside monitoring patient. See anesthesia notes. 1351 Endoscope was pre-cleaned at bedside immediately following procedure by Yesica Alejandre. 1352 Patient received Lidocaine and Propofol, per DARSHANA Morrow. Care of patient assumed from the anesthesia provider. Patient tolerated procedure well. Abdomen remains soft and non tender post procedure, no complaints or indication of discomfort noted at this time. See anesthesia note. Patient transferred to Endoscopy Recovery and report given to recovery nurse. 1418 TRANSFER - OUT REPORT:    Verbal report given to PIERCE SPENCE [facility]CARE RN (name) on Alberto Escobar  being transferred to [facility] (unit) for routine progression of care       Report consisted of patients Situation, Background, Assessment and   Recommendations(SBAR). Information from the following report(s) SBAR, Procedure Summary, MAR and Recent Results was reviewed with the receiving nurse. Lines:   Peripheral IV 04/21/21 Right;Upper Basilic (Active)   Site Assessment Clean, dry, & intact 04/21/21 1220   Phlebitis Assessment 0 04/21/21 1220   Infiltration Assessment 0 04/21/21 1220   Dressing Status Clean, dry, & intact 04/21/21 1220   Dressing Type Transparent;Tape 04/21/21 1220   Hub Color/Line Status Pink;Patent; Infusing 04/21/21 1220   Action Taken Open ports on tubing capped 04/21/21 1220   Alcohol Cap Used Yes 04/21/21 1220        Opportunity for questions and clarification was provided.       Patient transported with:  Patient chart  IV lock

## 2021-04-21 NOTE — ANESTHESIA PREPROCEDURE EVALUATION
Relevant Problems   GASTROINTESTINAL   (+) GERD (gastroesophageal reflux disease)       Anesthetic History   No history of anesthetic complications            Review of Systems / Medical History  Patient summary reviewed, nursing notes reviewed and pertinent labs reviewed    Pulmonary  Within defined limits                 Neuro/Psych   Within defined limits           Cardiovascular                    Comments: Pulmonary hypertension for 12 years stable.     GI/Hepatic/Renal  Within defined limits   GERD           Endo/Other  Within defined limits           Other Findings              Physical Exam    Airway  Mallampati: II  TM Distance: 4 - 6 cm  Neck ROM: normal range of motion   Mouth opening: Normal     Cardiovascular    Rhythm: regular  Rate: normal         Dental  No notable dental hx       Pulmonary  Breath sounds clear to auscultation               Abdominal         Other Findings            Anesthetic Plan    ASA: 3  Anesthesia type: MAC            Anesthetic plan and risks discussed with: Patient

## 2021-04-21 NOTE — ANESTHESIA POSTPROCEDURE EVALUATION
Procedure(s):  ESOPHAGOGASTRODUODENOSCOPY (EGD)  COLONOSCOPY. MAC    Anesthesia Post Evaluation        Patient location during evaluation: bedside  Level of consciousness: awake  Pain management: satisfactory to patient  Airway patency: patent  Anesthetic complications: no  Cardiovascular status: acceptable  Respiratory status: acceptable  Hydration status: acceptable        INITIAL Post-op Vital signs:   Vitals Value Taken Time   BP     Temp     Pulse 89 04/21/21 1358   Resp 31 04/21/21 1358   SpO2 94 % 04/21/21 1358   Vitals shown include unvalidated device data.

## 2021-04-21 NOTE — PROGRESS NOTES
Alex Floyd  1965  [id number]    Situation:  Verbal report received from: Jose M Rucker RN   Procedure: Procedure(s):  ESOPHAGOGASTRODUODENOSCOPY (EGD)  COLONOSCOPY    Background:    Preoperative diagnosis: abdominal pain  Postoperative diagnosis: Hiatal hernia  Diverticulosis    :  Dr. Amador Leal  Assistant(s): Endoscopy Technician-1: Moon Schuster  Endoscopy RN-1: Fran Patterson RN    Specimens: * No specimens in log *  H. Pylori  no    Assessment:    Anesthesia gave intra-procedure sedation and medications, see anesthesia flow sheet no    Intravenous fluids: NS@ KVO     Vital signs stable     Abdominal assessment: round and soft     Recommendation:  Discharge patient per MD order.   Return to floor  Family or Friend   Permission to share finding with family or friend yes

## 2021-04-21 NOTE — PROGRESS NOTES
[facility] [address] [place] [address]  [phone]      Medical Progress Note      NAME: Santiago Vaughn   :  1965  MRM:  [id number]    Date/Time of service: 2021  11:23 AM       Subjective:     Chief Complaint:  Patient was personally seen and examined by me during this time period. Chart reviewed. C/o L arm swelling. Doppler w/ superficial thrombus, likely from IVs       Objective:       Vitals:       Last 24hrs VS reviewed since prior progress note.  Most recent are:    Visit Vitals  /68 (BP 1 Location: Right upper arm, BP Patient Position: At rest)   Pulse 87   Temp 98.3 °F (36.8 °C)   Resp 18   Ht 5' 6\" (1.676 m)   Wt 73.5 kg (162 lb)   SpO2 95%   BMI 26.15 kg/m²     SpO2 Readings from Last 6 Encounters:   21 95%   19 98%            Intake/Output Summary (Last 24 hours) at 2021 1123  Last data filed at 2021 0800  Gross per 24 hour   Intake 1118.75 ml   Output 0 ml   Net 1118.75 ml        Exam:     Physical Exam:    Gen:  Well-developed, well-nourished, in no acute distress  HEENT:  Pink conjunctivae, PERRL, hearing intact to voice, moist mucous membranes  Neck:  Supple, without masses, thyroid non-tender  Resp:  No accessory muscle use, clear breath sounds without wheezes rales or rhonchi  Card:  1/6 murmurs, normal S1, S2 without thrills, bruits or peripheral edema  Abd:  Soft, mild abd pain, +BS  Musc:  No cyanosis or clubbing, mild L ankle pain  Skin:  No rashes   Neuro:  Cranial nerves 3-12 are grossly intact, follows commands appropriately  Psych:  Good insight, oriented to person, place and time, alert    Medications Reviewed: (see below)    Lab Data Reviewed: (see below)    ______________________________________________________________________    Medications:     Current Facility-Administered Medications   Medication Dose Route Frequency    potassium phosphate 30 mmol in 0.9% sodium chloride 250 mL infusion IntraVENous ONCE    dextrose 5% - 0.45% NaCl with KCl 20 mEq/L infusion  75 mL/hr IntraVENous CONTINUOUS    traZODone (DESYREL) tablet 150 mg  150 mg Oral QHS    cefTRIAXone (ROCEPHIN) 2 g in sterile water (preservative free) 20 mL IV syringe  2 g IntraVENous Q24H    hydrocortisone (CORTAID) 1 % cream   Topical BID    sodium chloride (NS) flush 5-40 mL  5-40 mL IntraVENous Q8H    sodium chloride (NS) flush 5-40 mL  5-40 mL IntraVENous PRN    acetaminophen (TYLENOL) tablet 650 mg  650 mg Oral Q6H PRN    Or    acetaminophen (TYLENOL) suppository 650 mg  650 mg Rectal Q6H PRN    polyethylene glycol (MIRALAX) packet 17 g  17 g Oral DAILY PRN    senna (SENOKOT) tablet 8.6 mg  1 Tab Oral DAILY PRN    metroNIDAZOLE (FLAGYL) IVPB premix 500 mg  500 mg IntraVENous Q12H    pantoprazole (PROTONIX) 40 mg in 0.9% sodium chloride 10 mL injection  40 mg IntraVENous Q12H          Lab Review:     Recent Labs     04/21/21  0622 04/20/21  0630 04/19/21  0032   WBC 2.1* 1.8* 2.3*   HGB 10.2* 8.9* 9.9*   HCT 33.5* 28.4* 30.2*    195 212     Recent Labs     04/21/21  0622 04/20/21  0630 04/19/21  0032 04/18/21  1352    138 136 136   K 3.6 3.4* 3.2* 3.7   * 109* 106 105   CO2 15* 24 24 27   GLU 73 70 77 82   BUN 2* 5* 4* 7   CREA 0.59 0.60 0.49* 0.66   CA 7.6* 8.0* 8.3* 8.2*   MG 1.7 1.8 1.6  --    PHOS 2.4* 3.2  --   --    ALB  --   --  3.2* 3.1*   TBILI  --   --  0.4 0.4   ALT  --   --  20 20     No results found for: GLUCPOC       Assessment / Plan:     54 yo hx of pulm HTN, SLE, presented w/ abd pain, gastric wall thickening, abd lymphadenopathy, acute colitis     1) Acute colitis/abd pain: slowly improving. Cont IV CTX/Flagyl. Use IV dilaudid prn severe pain. GI following, awaiting EGD/colon    2) Gastric wall thickening: awaiting EGD. Cont IV PPI    3) Pelvic lymphadenopathy: unclear etiology. Can f/u with PCP on [facility]     4) Pulm HTN: cont home Opsumit once able to take PO.   F/u with Pulm [facility] Cards in [place]    5) SLE: holding Plaquenil. F/u with Rheum in [place]    6) BLE edema: LE dopplers neg DVT. Likely related to pulm HTN. Cont lasix prn    7) LUE swelling: due to IVs.  Doppler with superficial thrombus. Cont warm compresses    8) L ankle pain: chronic. Hx of prior ankle surg.   F/u with Ortho in [place]    Total time spent with patient: 35 min **I personally saw and examined the patient during this time period**                 Care Plan discussed with: Patient, nursing     Discussed:  Care Plan    Prophylaxis:  SCD's    Disposition:  Home w/Family           ___________________________________________________    Attending Physician: Jarad Christianson MD

## 2021-04-21 NOTE — PROCEDURES
Dayne Gonzalez M.D. 2021    Esophagogastroduodenoscopy (EGD) Colonoscopy Procedure Note  Chaparrita Fox  : 1965  [facility] Medical Record Number: [id number]      Indications:    general abd pain, abn Ct referred to EG junction, colon  Referring Physician:  Robbin Valero MD  Anesthesia/Sedation: see nursing notes  Endoscopist:  Dr. Mando Gay  Assistants: None  Permit:  The indications, risks, benefits and alternatives were reviewed with the patient or their decision maker who was provided an opportunity to ask questions and all questions were answered. The specific risks of esophagogastroduodenoscopy with conscious sedation were reviewed, including but not limited to anesthetic complication, bleeding, adverse drug reaction, missed lesion, infection, IV site reactions, and intestinal perforation which would lead to the need for surgical repair. Alternatives to EGD including radiographic imaging, observation without testing, or laboratory testing were reviewed as well as the limitations of those alternatives discussed. After considering the options and having all their questions answered, the patient or their decision maker provided both verbal and written consent to proceed. Procedure in Detail:  After obtaining informed consent, positioning of the patient in the left lateral decubitus position, and conduction of a pre-procedure pause or \"time out\" the endoscope was introduced into the mouth and advanced to the duodenum. A careful inspection was made, and findings or interventions are described below.     Findings:   Esophagus:normal  Stomach: hiatal hernia and no mucosal lesion appreciated  Duodenum/jejunum: normal    Complications/estimated blood loss: none    Therapies:  none    Specimens: none    Implants:none           Endoscopist:  Dr. Mando Gay  Assistants: None  Complications: None  Estimate Blood Loss:  None    Colonoscopy is to follow    Permit:  The indications, risks, benefits and alternatives were reviewed with the patient or their decision maker who was provided an opportunity to ask questions and all questions were answered. The specific risks of colonoscopy with conscious sedation were reviewed, including but not limited to anesthetic complication, bleeding, adverse drug reaction, missed lesion, infection, IV site reactions, and intestinal perforation which would lead to the need for surgical repair. Alternatives to colonoscopy including radiographic imaging, observation without testing, or laboratory testing were reviewed including the limitations of those alternatives. After considering the options and having all their questions answered, the patient or their decision maker provided both verbal and written consent to proceed. Procedure in Detail:  After obtaining informed consent, positioning of the patient in the left lateral decubitus position, and conduction of a pre-procedure pause or \"time out\" the endoscope was introduced into the anus and advanced to the terminal ileum. The quality of the colonic preparation was good. A careful inspection was made as the colonoscope was withdrawn, findings and interventions are described below. Appendiceal orifice photographed    Findings:   no mucosal lesion appreciated      - Diverticulosis ; few, sigmoid location; no inflammation    Specimens:    none    Implants: none    Complications:   None; patient tolerated the procedure well. Estimated blood loss: none    Impression:  See post-procedure diagnoses    Recommendations:      - Daily PPI; no additional follow up exams planned. Other routine recommendations for GI MD's in her home location    Thank you for entrusting me with this patient's care.   Please do not hesitate to contact me with any questions or if I can be of assistance with any of your other patients' GI needs.    Signed By: Geovani Yi MD                        April 21, 2021

## 2021-04-21 NOTE — PROGRESS NOTES
GI  Exam shows small hiatal, few diverticulosis.   From my perspective can discharge on daily OTC omeprazole or esomeprazole, follow up with Radha [facility]GROVE's    Thanks

## 2021-04-21 NOTE — PROGRESS NOTES
Gastrointestinal Progress Note    4/21/2021    Admit Date: 4/18/2021    Subjective:     New Complaints Today:  No    Pain: Does not have any abdominal pain at rest, only if puts pressure on abdomen. Bowel Movements: Had multiple episodes of diarrhea without blood due to bowel prep yesterday    Bleeding: None reported overnight    Current Facility-Administered Medications   Medication Dose Route Frequency    potassium phosphate 30 mmol in 0.9% sodium chloride 250 mL infusion   IntraVENous ONCE    dextrose 5% - 0.45% NaCl with KCl 20 mEq/L infusion  75 mL/hr IntraVENous CONTINUOUS    traZODone (DESYREL) tablet 150 mg  150 mg Oral QHS    cefTRIAXone (ROCEPHIN) 2 g in sterile water (preservative free) 20 mL IV syringe  2 g IntraVENous Q24H    hydrocortisone (CORTAID) 1 % cream   Topical BID    sodium chloride (NS) flush 5-40 mL  5-40 mL IntraVENous Q8H    sodium chloride (NS) flush 5-40 mL  5-40 mL IntraVENous PRN    acetaminophen (TYLENOL) tablet 650 mg  650 mg Oral Q6H PRN    Or    acetaminophen (TYLENOL) suppository 650 mg  650 mg Rectal Q6H PRN    polyethylene glycol (MIRALAX) packet 17 g  17 g Oral DAILY PRN    senna (SENOKOT) tablet 8.6 mg  1 Tab Oral DAILY PRN    metroNIDAZOLE (FLAGYL) IVPB premix 500 mg  500 mg IntraVENous Q12H    pantoprazole (PROTONIX) 40 mg in 0.9% sodium chloride 10 mL injection  40 mg IntraVENous Q12H        Objective:     Blood pressure 116/68, pulse 87, temperature 98.3 °F (36.8 °C), resp.  rate 18, height 5' 6\" (1.676 m), weight 162 lb (73.5 kg), SpO2 95 %.    04/21 0701 - 04/21 1900  In: 151.3 [I.V.:151.3]  Out: -     04/19 1901 - 04/21 0700  In: 9842 [P.O.:560; I.V.:2035]  Out: 0     EXAM:  GENERAL: alert, cooperative, no distress, HEART: regular rate and rhythm, S1, S2 normal, no murmur, click, rub or gallop, LUNGS: chest clear, no wheezing, rales, normal symmetric air entry, ABDOMEN:  Bowel sounds are normal, liver is not enlarged, spleen is not enlarged and EXTREMITY: extremities normal, atraumatic, no cyanosis or edema      Data Review    Recent Results (from the past 24 hour(s))   CBC W/O DIFF    Collection Time: 04/21/21  6:22 AM   Result Value Ref Range    WBC 2.1 (L) 3.6 - 11.0 K/uL    RBC 3.53 (L) 3.80 - 5.20 M/uL    HGB 10.2 (L) 11.5 - 16.0 g/dL    HCT 33.5 (L) 35.0 - 47.0 %    MCV 94.9 80.0 - 99.0 FL    MCH 28.9 26.0 - 34.0 PG    MCHC 30.4 30.0 - 36.5 g/dL    RDW 12.9 11.5 - 14.5 %    PLATELET 985 711 - 933 K/uL    MPV 9.7 8.9 - 12.9 FL    NRBC 0.0 0  WBC    ABSOLUTE NRBC 0.00 0.00 - 5.94 K/uL   METABOLIC PANEL, BASIC    Collection Time: 04/21/21  6:22 AM   Result Value Ref Range    Sodium 136 136 - 145 mmol/L    Potassium 3.6 3.5 - 5.1 mmol/L    Chloride 113 (H) 97 - 108 mmol/L    CO2 15 (LL) 21 - 32 mmol/L    Anion gap 8 5 - 15 mmol/L    Glucose 73 65 - 100 mg/dL    BUN 2 (L) 6 - 20 MG/DL    Creatinine 0.59 0.55 - 1.02 MG/DL    BUN/Creatinine ratio 3 (L) 12 - 20      GFR est AA >60 >60 ml/min/1.73m2    GFR est non-AA >60 >60 ml/min/1.73m2    Calcium 7.6 (L) 8.5 - 10.1 MG/DL   PHOSPHORUS    Collection Time: 04/21/21  6:22 AM   Result Value Ref Range    Phosphorus 2.4 (L) 2.6 - 4.7 MG/DL   MAGNESIUM    Collection Time: 04/21/21  6:22 AM   Result Value Ref Range    Magnesium 1.7 1.6 - 2.4 mg/dL       Assessment:     Principal Problem:    Acute colitis (4/18/2021)    Active Problems:    Lupus erythematosus (4/18/2021)      Bilateral lower extremity edema (4/18/2021)      GERD (gastroesophageal reflux disease) (4/18/2021)      Pulmonary hypertension ([facility] [address] (4/18/2021)        Plan:     1. Acute cholitis - continue IV CTX and flagyl. Sx have improved. 2.  Gastric wall thickening - Noted on CT A/P. EGD/colonoscopy today for further evaluation with possible biopsies  3.   GERD - IV PPI    Chantal Jamison MD  Family Medicine Resident

## 2021-04-22 LAB
ANION GAP SERPL CALC-SCNC: 7 MMOL/L (ref 5–15)
BUN SERPL-MCNC: 2 MG/DL (ref 6–20)
BUN/CREAT SERPL: 3 (ref 12–20)
CALCIUM SERPL-MCNC: 8.1 MG/DL (ref 8.5–10.1)
CHLORIDE SERPL-SCNC: 108 MMOL/L (ref 97–108)
CO2 SERPL-SCNC: 22 MMOL/L (ref 21–32)
CREAT SERPL-MCNC: 0.64 MG/DL (ref 0.55–1.02)
ERYTHROCYTE [DISTWIDTH] IN BLOOD BY AUTOMATED COUNT: 12.9 % (ref 11.5–14.5)
GLUCOSE SERPL-MCNC: 81 MG/DL (ref 65–100)
HCT VFR BLD AUTO: 28.7 % (ref 35–47)
HGB BLD-MCNC: 9.3 G/DL (ref 11.5–16)
LACTATE SERPL-SCNC: 1 MMOL/L (ref 0.4–2)
MAGNESIUM SERPL-MCNC: 1.8 MG/DL (ref 1.6–2.4)
MCH RBC QN AUTO: 29.1 PG (ref 26–34)
MCHC RBC AUTO-ENTMCNC: 32.4 G/DL (ref 30–36.5)
MCV RBC AUTO: 89.7 FL (ref 80–99)
NRBC # BLD: 0 K/UL (ref 0–0.01)
NRBC BLD-RTO: 0 PER 100 WBC
PHOSPHATE SERPL-MCNC: 3.5 MG/DL (ref 2.6–4.7)
PLATELET # BLD AUTO: 207 K/UL (ref 150–400)
PMV BLD AUTO: 9.7 FL (ref 8.9–12.9)
POTASSIUM SERPL-SCNC: 3.8 MMOL/L (ref 3.5–5.1)
RBC # BLD AUTO: 3.2 M/UL (ref 3.8–5.2)
SODIUM SERPL-SCNC: 137 MMOL/L (ref 136–145)
WBC # BLD AUTO: 1.7 K/UL (ref 3.6–11)

## 2021-04-22 PROCEDURE — 74011250636 HC RX REV CODE- 250/636: Performed by: INTERNAL MEDICINE

## 2021-04-22 PROCEDURE — 80048 BASIC METABOLIC PNL TOTAL CA: CPT

## 2021-04-22 PROCEDURE — 97162 PT EVAL MOD COMPLEX 30 MIN: CPT

## 2021-04-22 PROCEDURE — 36415 COLL VENOUS BLD VENIPUNCTURE: CPT

## 2021-04-22 PROCEDURE — 97530 THERAPEUTIC ACTIVITIES: CPT

## 2021-04-22 PROCEDURE — 84100 ASSAY OF PHOSPHORUS: CPT

## 2021-04-22 PROCEDURE — 83735 ASSAY OF MAGNESIUM: CPT

## 2021-04-22 PROCEDURE — 85027 COMPLETE CBC AUTOMATED: CPT

## 2021-04-22 PROCEDURE — C9113 INJ PANTOPRAZOLE SODIUM, VIA: HCPCS | Performed by: INTERNAL MEDICINE

## 2021-04-22 PROCEDURE — 2709999900 HC NON-CHARGEABLE SUPPLY

## 2021-04-22 PROCEDURE — 83605 ASSAY OF LACTIC ACID: CPT

## 2021-04-22 RX ORDER — TRAMADOL HYDROCHLORIDE 50 MG/1
50 TABLET ORAL
Qty: 20 TAB | Refills: 0 | Status: SHIPPED | OUTPATIENT
Start: 2021-04-22 | End: 2021-04-27

## 2021-04-22 RX ORDER — TRAZODONE HYDROCHLORIDE 150 MG/1
150 TABLET ORAL
Qty: 20 TAB | Refills: 0 | Status: ON HOLD | OUTPATIENT
Start: 2021-04-22 | End: 2021-11-08

## 2021-04-22 RX ORDER — PANTOPRAZOLE SODIUM 20 MG/1
20 TABLET, DELAYED RELEASE ORAL
Qty: 60 TAB | Refills: 0 | Status: ON HOLD | OUTPATIENT
Start: 2021-04-22 | End: 2021-11-07

## 2021-04-22 RX ORDER — CEFDINIR 300 MG/1
300 CAPSULE ORAL 2 TIMES DAILY
Qty: 10 CAP | Refills: 0 | Status: SHIPPED | OUTPATIENT
Start: 2021-04-22 | End: 2021-04-27

## 2021-04-22 RX ORDER — METRONIDAZOLE 500 MG/1
500 TABLET ORAL 2 TIMES DAILY
Qty: 10 TAB | Refills: 0 | Status: SHIPPED | OUTPATIENT
Start: 2021-04-22 | End: 2021-04-27

## 2021-04-22 RX ADMIN — METRONIDAZOLE 500 MG: 500 INJECTION, SOLUTION INTRAVENOUS at 06:20

## 2021-04-22 RX ADMIN — HYDROCORTISONE: 1 CREAM TOPICAL at 08:27

## 2021-04-22 RX ADMIN — PANTOPRAZOLE SODIUM 40 MG: 40 INJECTION, POWDER, FOR SOLUTION INTRAVENOUS at 08:27

## 2021-04-22 RX ADMIN — Medication 10 ML: at 06:20

## 2021-04-22 NOTE — PROGRESS NOTES
Discharge medications reviewed with patient and appropriate educational materials and side effects teaching were provided. I have reviewed discharge instructions with the patient. The patient verbalized understanding. AVS signed via paper and placed in chart.

## 2021-04-22 NOTE — PROGRESS NOTES
Occupational therapy note:  Orders received, chart reviewed. Patient seen ambulating in ruiz with PT. Patient independent with ambulation. Patient has been up ad radha in room and managing ADLs without assistance. Patient does not require acute OT services at this time. Will complete current orders. Leni Burnett MS OTR/L

## 2021-04-22 NOTE — PROGRESS NOTES
CORINNE:   1) Home with family assistance   2) Home with f.u appts   3) Risk of readmission- 14% LOW     D/C Order Noted-   Hospital Day 3:   10:39 AM- CM met with pt at bedside-pt states she plans to go to the home in [place], states that she has a strong support system here and plans to stay for another month. Pt states she will make a virtual follow up appt with her PCP in [place]. Pt states she will drive herself at time of discharge. Medicare pt has received, reviewed, and signed 2nd IM letter informing them of their right to appeal the discharge. Signed copy has been placed on pt bedside chart. Pt states no questions or concerns, RN informed. Care Management Interventions  PCP Verified by CM:  Yes  Mode of Transport at Discharge: Self  Transition of Care Consult (CM Consult): Discharge Planning  MyChart Signup: No  Discharge Durable Medical Equipment: No  Health Maintenance Reviewed: Yes  Physical Therapy Consult: No  Occupational Therapy Consult: No  Speech Therapy Consult: No  Current Support Network: Family Lives Nearby, Lives with Spouse  Confirm Follow Up Transport: Self  Discharge Location  Discharge Placement: Home with family assistance     SARAH Cain, [address]

## 2021-04-22 NOTE — PROGRESS NOTES
Pharmacist Discharge Medication Reconciliation    Discharge Provider:  Dr. Junie Lowery       Discharge Medications:      My Medications        START taking these medications        Instructions Each Dose to Equal Morning Noon Evening Bedtime   cefdinir 300 mg capsule  Commonly known as: OMNICEF    Your last dose was: Your next dose is: Take 1 Cap by mouth two (2) times a day for 5 days. 300 mg                 metroNIDAZOLE 500 mg tablet  Commonly known as: FlagyL    Your last dose was: Your next dose is: Take 1 Tab by mouth two (2) times a day for 5 days. 500 mg                 pantoprazole 20 mg tablet  Commonly known as: Protonix    Your last dose was: Your next dose is: Take 1 Tab by mouth Before breakfast and dinner. 20 mg                        CHANGE how you take these medications        Instructions Each Dose to Equal Morning Noon Evening Bedtime   traZODone 150 mg tablet  Commonly known as: DESYREL  What changed: how much to take    Your last dose was: Your next dose is: Take 1 Tab by mouth nightly. 150 mg                        CONTINUE taking these medications        Instructions Each Dose to Equal Morning Noon Evening Bedtime   alendronate 70 mg tablet  Commonly known as: FOSAMAX    Your last dose was: Your next dose is: Take 70 mg by mouth every Monday. 70 mg                 amLODIPine 10 mg tablet  Commonly known as: NORVASC    Your last dose was: Your next dose is: Take 10 mg by mouth daily. 10 mg                 atorvastatin 10 mg tablet  Commonly known as: LIPITOR    Your last dose was: Your next dose is: Take 10 mg by mouth daily. 10 mg                 EPINEPHrine 0.3 mg/0.3 mL injection  Commonly known as: EPIPEN    Your last dose was: Your next dose is:         0.3 mg by IntraMUSCular route once as needed for Allergic Response.    0.3 mg                 Klor-Con M20 20 mEq tablet  Generic drug: potassium chloride    Your last dose was: Your next dose is: Take 20 mEq by mouth daily as needed. 20 mEq                 Lasix 40 mg tablet  Generic drug: furosemide    Your last dose was: Your next dose is: Take 40 mg by mouth daily as needed (\"for swelling\"). 40 mg                 LORazepam 0.5 mg tablet  Commonly known as: ATIVAN    Your last dose was: Your next dose is: Take 0.5 mg by mouth every six (6) hours as needed for Anxiety. 0.5 mg                 Opsumit 10 mg Tab  Generic drug: macitentan    Your last dose was: Your next dose is: Take 10 mg by mouth daily. 10 mg                 Plaquenil 200 mg tablet  Generic drug: hydrOXYchloroQUINE    Your last dose was: Your next dose is: Take 200 mg by mouth two (2) times a day. 200 mg                 traMADoL 50 mg tablet  Commonly known as: ULTRAM    Your last dose was: Your next dose is: Take 1 Tab by mouth every six (6) hours as needed for Pain for up to 5 days. Max Daily Amount: 200 mg. Indications: neuropathic pain   50 mg                           Where to Get Your Medications        Information on where to get these meds will be given to you by the nurse or doctor.     Ask your nurse or doctor about these medications  cefdinir 300 mg capsule  metroNIDAZOLE 500 mg tablet  pantoprazole 20 mg tablet  traMADoL 50 mg tablet  traZODone 150 mg tablet       The patient's chart, MAR, and AVS were reviewed by   KATIA Simeon,   Contact: [phone]

## 2021-04-22 NOTE — DISCHARGE INSTRUCTIONS
HOSPITALIST DISCHARGE INSTRUCTIONS  NAME: Karyn Pierson   :  1965   MRN:  [id number]     Date/Time:  2021 10:25 AM    ADMIT DATE: 2021     DISCHARGE DATE: 2021     ADMITTING DIAGNOSIS:  Acute colitis of unknown etiology    DISCHARGE DIAGNOSIS:  Same     MEDICATIONS:  See after visit summary       · It is important that you take the medication exactly as they are prescribed. · Keep your medication in the bottles provided by the pharmacist and keep a list of the medication names, dosages, and times to be taken in your wallet. · Do not take other medications without consulting your doctor     Pain Management: per above medications    What to do at Home    Recommended diet:  Low fat, Low cholesterol    Recommended activity: Activity as tolerated    1) Return to the hospital if you feel worse    2) If you experience any of the following symptoms then please call your primary care physician or return to the emergency room if you cannot get hold of your doctor:  Fever, chills, nausea, vomiting, diarrhea, change in mentation, falling, bleeding, shortness of breath, chest pain, severe headache, severe abdominal pain,     3) Please follow up with your cardiologist, pulmonologist, rheumatologist, and primary care doctor in [place]    Follow Up: Follow-up Information     Follow up With Specialties Details Why Contact Info    your doctors in [address] [place] [address] Drive an appointment as soon as possible for a visit in 1 week              Information obtained by :  I understand that if any problems occur once I am at home I am to contact my physician. I understand and acknowledge receipt of the instructions indicated above.                                                                                                                                            Physician's or R.N.'s Signature                                                                  Date/Time Patient or Representative Signature                                                          Date/Time      Patient Education     Colitis: Care Instructions  Your Care Instructions  Colitis is the medical term for swelling (inflammation) of the intestine. It can be caused by different things, such as an infection or loss of blood flow in the intestine. Other causes are problems like Crohn's disease or ulcerative colitis. Symptoms may include fever, diarrhea that may be bloody, or belly pain. Sometimes symptoms go away without treatment. But you may need treatment or more tests, such as blood tests or a stool test. Or you may need imaging tests like a CT scan or a colonoscopy. In some cases, the doctor may want to test a sample of tissue from the intestine. This test is called a biopsy. The doctor has checked you carefully, but problems can develop later. If you notice any problems or new symptoms, get medical treatment right away. Follow-up care is a key part of your treatment and safety. Be sure to make and go to all appointments, and call your doctor if you are having problems. It's also a good idea to know your test results and keep a list of the medicines you take. How can you care for yourself at home? · Rest until you feel better. · Your doctor may recommend that you eat bland foods. These include rice, dry toast or crackers, bananas, and applesauce. · To prevent dehydration, drink plenty of fluids. Choose water and other caffeine-free clear liquids until you feel better. If you have kidney, heart, or liver disease and have to limit fluids, talk with your doctor before you increase the amount of fluids you drink. · Be safe with medicines. Take your medicines exactly as prescribed. Call your doctor if you think you are having a problem with your medicine.  You will get more details on the specific medicines your doctor prescribes. When should you call for help? Call 911 anytime you think you may need emergency care. For example, call if:  · You passed out (lost consciousness). · You vomit blood or what looks like coffee grounds. · Your stools are maroon or very bloody. Call your doctor now or seek immediate medical care if:  · You have new or worse pain. · You have a new or higher fever. · You have new or worse symptoms. · You cannot keep fluids or medicines down. Watch closely for changes in your health, and be sure to contact your doctor if:  · You do not get better as expected. Where can you learn more? Go to 3DMGAME.be  Enter [id number] in the search box to learn more about \"Colitis: Care Instructions. \"   © 7481-9846 Healthwise, Incorporated. Care instructions adapted under license by [facility] (which disclaims liability or warranty for this information). This care instruction is for use with your licensed healthcare professional. If you have questions about a medical condition or this instruction, always ask your healthcare professional. [place] [address] any warranty or liability for your use of this information.   Content Version: 55.5.673576; Current as of: November 20, 2015

## 2021-04-22 NOTE — PROGRESS NOTES
Bedside and Verbal shift change report given to [address] [place] [address] (oncoming nurse) by Ean Washburn RN (offgoing nurse). Report included the following information SBAR, Kardex, Intake/Output, MAR, Accordion and Recent Results.

## 2021-04-22 NOTE — PROGRESS NOTES
PHYSICAL THERAPY EVALUATION/DISCHARGE  Patient: Ly Lacey (89 y.o. female)  Date: 4/22/2021  Primary Diagnosis: Acute colitis [K52.9]  Procedure(s) (LRB):  ESOPHAGOGASTRODUODENOSCOPY (EGD) (N/A)  COLONOSCOPY (N/A) 1 Day Post-Op   Precautions: Universal         ASSESSMENT  Based on the objective data described below, the patient presents with independent flat surface gait, transfers, and bed mobility on day 3 of admission with acute colitis. She is s/p EGD with findings of diverticulitis and hiatal hernia with plan for medical management and discharge home today. Pt tolerates activity without complaints, demonstrates appropriate safety awareness, and denies functional impairment compared with baseline. Functional Outcome Measure: The patient scored 100 on the Barthel outcome measure which is indicative of 0% functional impairment. Other factors to consider for discharge: none noted     Further skilled acute physical therapy is not indicated at this time. PLAN :  Recommendation for discharge: (in order for the patient to meet his/her long term goals)  No skilled physical therapy/ follow up rehabilitation needs identified at this time. This discharge recommendation:  Has not yet been discussed the attending provider and/or case management    IF patient discharges home will need the following DME: none       SUBJECTIVE:   Patient stated Joie Glez said the screws would have to come out so that might be what's wrong,\" re: L ankle pain with initial weight bearing. .    Pt received supine, agreeable to PT and cleared by RN.       OBJECTIVE DATA SUMMARY:   HISTORY:    Past Medical History:   Diagnosis Date    Anemia     Arm vein blood clot, left     History of colon polyps     Lupus ([facility] [address]     Pulmonary hypertension (HCC)      Past Surgical History:   Procedure Laterality Date    COLONOSCOPY N/A 4/21/2021    COLONOSCOPY performed by Deshawn Soto MD at [facility] [address] [facility] Left     HX COLONOSCOPY         Prior level of function: independent at community level without impairments or adaptive equipment. Drives independently  Personal factors and/or comorbidities impacting plan of care: as above. Home Situation  Home Environment: Private residence  # Steps to Enter: 5  Rails to Enter: Yes  One/Two Story Residence: One story  Living Alone: No  Support Systems: Family member(s)  Patient Expects to be Discharged to[de-identified] Private residence  Current DME Used/Available at Home: None    EXAMINATION/PRESENTATION/DECISION MAKING:   Critical Behavior:  Neurologic State: Alert, Appropriate for age  Orientation Level: Oriented X4  Cognition: Follows commands     Hearing: Auditory  Auditory Impairment: None  Skin:  LE exposed skin intact  Edema: none noted LEs  Range Of Motion:  AROM: Within functional limits                       Strength:    Strength: Within functional limits                    Tone & Sensation:   Tone: Normal              Sensation: Intact               Coordination:  Coordination: Within functional limits  Vision:      Functional Mobility:  Bed Mobility:     Supine to Sit: Independent     Scooting: Independent  Transfers:  Sit to Stand: Independent  Stand to Sit: Independent                       Balance:   Sitting: Intact  Standing: Intact  Ambulation/Gait Training:  Distance (ft): 150 Feet (ft)  Assistive Device: Gait belt  Ambulation - Level of Assistance: Modified independent        Gait Abnormalities: Antalgic(L ankle discomfort initially, consistent with baseline )              Speed/Zhane: Pace decreased (<100 feet/min)                              Functional Measure:  Barthel Index:    Bathin  Bladder: 10  Bowels: 10  Groomin  Dressing: 10  Feeding: 10  Mobility: 15  Stairs: 10(inferred)  Toilet Use: 10  Transfer (Bed to Chair and Back): 15  Total: 100/100       The Barthel ADL Index: Guidelines  1.  The index should be used as a record of what a patient does, not as a record of what a patient could do. 2. The main aim is to establish degree of independence from any help, physical or verbal, however minor and for whatever reason. 3. The need for supervision renders the patient not independent. 4. A patient's performance should be established using the best available evidence. Asking the patient, friends/relatives and nurses are the usual sources, but direct observation and common sense are also important. However direct testing is not needed. 5. Usually the patient's performance over the preceding 24-48 hours is important, but occasionally longer periods will be relevant. 6. Middle categories imply that the patient supplies over 50 per cent of the effort. 7. Use of aids to be independent is allowed. Patricia Garnica., Barthel, D.W. (9724). Functional evaluation: the Barthel Index. [facility] (14)2. Leonette Castleman [place], J.J.M.F, Ynes Moore., Jessica [facility] [place], [facility] (1999). Measuring the change indisability after inpatient rehabilitation; comparison of the responsiveness of the Barthel Index and Functional [place] Measure. Journal of Neurology, Neurosurgery, and Psychiatry, 66(4), 871-497. Jaime Cottrell, N.J.A, Mario Rowell,  SAJAN.J.M, & Shelley Pereyra M.A. (2004.) Assessment of post-stroke quality of life in cost-effectiveness studies: The usefulness of the Barthel Index and the EuroQoL-5D.  Quality of Life Research, 15, 048-29            Physical Therapy Evaluation Charge Determination   History Examination Presentation Decision-Making   HIGH Complexity :3+ comorbidities / personal factors will impact the outcome/ POC  HIGH Complexity : 4+ Standardized tests and measures addressing body structure, function, activity limitation and / or participation in recreation  LOW Complexity : Stable, uncomplicated  Other outcome measures barthel  LOW       Based on the above components, the patient evaluation is determined to be of the following complexity level: LOW     Pain Rating:  Denies pain at rest; L ankle pain that improves with progressive ambulation and AROM. Activity Tolerance:   Good      After treatment patient left in no apparent distress:   Sitting in chair and Call bell within reach    COMMUNICATION/EDUCATION:   The patients plan of care was discussed with: Occupational therapist and Registered nurse. Fall prevention education was provided and the patient/caregiver indicated understanding., Patient/family have participated as able in goal setting and plan of care. and Patient/family agree to work toward stated goals and plan of care.     Thank you for this referral.  Nicole Bryson, PT, DPT   Time Calculation: 23 mins

## 2021-04-22 NOTE — PROGRESS NOTES
Bedside and Verbal shift change report given to [address] (oncoming nurse) by Johnny Centeno (offgoing nurse). Report included the following information SBAR, Intake/Output and Recent Results.

## 2021-04-22 NOTE — DISCHARGE SUMMARY
[facility]  [address] [facility], [place], [address] [place] [address]  [phone]    Physician Discharge Summary     Patient ID:  Saravanan Lenz  [id number]  55 y.o.  1965    Admit date: 4/18/2021    Discharge date and time: 4/22/2021 10:26 AM    Admission Diagnoses: Acute colitis [K52.9]    Discharge Diagnoses:  Principal Diagnosis Acute colitis                                            Principal Problem:    Acute colitis (4/18/2021)    Active Problems:    Lupus erythematosus (4/18/2021)      Bilateral lower extremity edema (4/18/2021)      GERD (gastroesophageal reflux disease) (4/18/2021)      Pulmonary hypertension ([facility] [address] (4/18/2021)           Hospital Course:     54 yo hx of pulm HTN, SLE, presented w/ abd pain, gastric wall thickening, abd lymphadenopathy, acute colitis      1) Acute colitis/abd pain: much improved. S/p EGD/colon which showed a small hiatal hernia and diverticulosis. Patient was on IV CTX/Flagyl, will finish a course of omnicef/flagyl. f/u with PCP in [place]      2) Gastric wall thickening: EGD w/ hiatal hernia. Started PPI     3) Pelvic lymphadenopathy: unclear etiology. Can f/u with PCP on [facility]      4) Pulm HTN: cont home Opsumit once able to take PO. F/u with Pulm and Cards in [place]     5) SLE: cont Plaquenil. F/u with Rheum in [place]     6) BLE edema: LE dopplers neg DVT. Likely related to pulm HTN. Cont lasix prn     7) LUE swelling: due to IVs.  Doppler with superficial thrombus. Cont warm compresses     8) L ankle pain: chronic. Hx of prior ankle surg.   F/u with Ortho in [place]    PCP: Other, Phys, MD     Consults: GI    Significant Diagnostic Studies: EGD/colon    Discharge Exam:  Physical Exam:    Gen: Well-developed, well-nourished, in no acute distress  HEENT:  Pink conjunctivae, PERRL, hearing intact to voice, moist mucous membranes  Neck: Supple, without masses, thyroid non-tender  Resp: No accessory muscle use, clear breath sounds without wheezes rales or rhonchi  Card: No murmurs, normal S1, S2 without thrills, bruits or peripheral edema  Abd:  Soft, non-tender, non-distended, normoactive bowel sounds are present  Lymph:  Mild cervical LAD  Musc: No cyanosis or clubbing  Skin: No rashes   Neuro:  Cranial nerves are grossly intact, no focal motor weakness, follows commands appropriately  Psych:  Good insight, oriented to person, place and time, alert    Disposition: home  Discharge Condition: Stable    Patient Instructions:   Current Discharge Medication List      START taking these medications    Details   pantoprazole (Protonix) 20 mg tablet Take 1 Tab by mouth Before breakfast and dinner. Qty: 60 Tab, Refills: 0      cefdinir (OMNICEF) 300 mg capsule Take 1 Cap by mouth two (2) times a day for 5 days. Qty: 10 Cap, Refills: 0      metroNIDAZOLE (FlagyL) 500 mg tablet Take 1 Tab by mouth two (2) times a day for 5 days. Qty: 10 Tab, Refills: 0         CONTINUE these medications which have CHANGED    Details   traMADoL (ULTRAM) 50 mg tablet Take 1 Tab by mouth every six (6) hours as needed for Pain for up to 5 days. Max Daily Amount: 200 mg. Indications: neuropathic pain  Qty: 20 Tab, Refills: 0    Associated Diagnoses: Colitis      traZODone (DESYREL) 150 mg tablet Take 1 Tab by mouth nightly. Qty: 20 Tab, Refills: 0         CONTINUE these medications which have NOT CHANGED    Details   atorvastatin (LIPITOR) 10 mg tablet Take 10 mg by mouth daily. LORazepam (ATIVAN) 0.5 mg tablet Take 0.5 mg by mouth every six (6) hours as needed for Anxiety. alendronate (FOSAMAX) 70 mg tablet Take 70 mg by mouth every Monday. amLODIPine (NORVASC) 10 mg tablet Take 10 mg by mouth daily. potassium chloride (Klor-Con M20) 20 mEq tablet Take 20 mEq by mouth daily as needed. EPINEPHrine (EPIPEN) 0.3 mg/0.3 mL injection 0.3 mg by IntraMUSCular route once as needed for Allergic Response.       macitentan (OPSUMIT) 10 mg tab Take 10 mg by mouth daily.      hydroxychloroquine (PLAQUENIL) 200 mg tablet Take 200 mg by mouth two (2) times a day. furosemide (Lasix) 40 mg tablet Take 40 mg by mouth daily as needed (\"for swelling\").            Activity: Activity as tolerated  Diet: Low fat, Low cholesterol  Wound Care: None needed    Follow-up with  Follow-up Information     Follow up With Specialties Details Why Contact Info    your doctors in [address] [place] [address] Drive an appointment as soon as possible for a visit in 1 week            Follow-up tests/labs none    Signed:  Deni Ashraf MD  4/22/2021  10:26 AM  **I personally spent 35 min on discharge**

## 2021-04-22 NOTE — PROGRESS NOTES
Physician Progress Note      Chivo Bryan  [facility] #:                  [id number]  :                       1965  ADMIT DATE:       2021 12:48 PM  [address] DATE:  RESPONDING  PROVIDER #:        Richi Sosa DO, MD          QUERY TEXT:    Dear Hospitalist Team,  Pt admitted with upper abdominal/epigastric pain and has Acute colitis documented. If possible, please document the type of colitis in the medical record. The medical record reflects the following:    Risk Factors: 55 Yr F admitted with Acute Colitis; SLE    Clinical Indicators: Patient arrives to the ED with c/o upper abd pain/epigastric tenderness. Work up in the ED revealed WBC 2.2 and CT abd/pelvis: Wall thickening in the proximal ascending colon. Small volume of ascites. No cirrhosis, no overt peritoneal carcinomatosis. Mildly enlarged pelvic lymph nodes. GE junction wall thickening. Questionable distal gastric wall thickening. GI was consulted. Patient undergoing colonoscopy today. Progress notes state, Acute Colitis/abd pain: slowly improving. Cont IV CTX/Flagyl. Use IV dilaudid prn severe pain. GI following, awaiting EGD/Colon. Patient has been receiving NS IVF at 50 ML/HR, Rocephin 2 G IV Q 24 hrs, D5 1/2 NS IVF at 75 ML/HR and Flagyl 500 mg IV Q 12 hrs. Treatment: Daily CBC/BMP, GI consult, pending colonoscopy/EGD, CT abd/pelvis, frequent monitoring/vital signs,  NS IVF at 50 ML/HR, Rocephin 2 G IV Q 24 hrs, D5 1/2 NS IVF at 75 ML/HR and Flagyl 500 mg IV Q 12 hrs. Thank you,  Smith Browne RN, [place], [address] [place] [address]  Options provided:  -- Bacterial Colitis  -- Toxic Colitis  -- Acute Ischemic Colitis  -- Chronic Ischemic Colitis  -- Ulcerative Colitis  -- Left-sided Ulcerative Colitis  -- Colitis due to other etiology, Please document other etiology.   -- Other - I will add my own diagnosis  -- Disagree - Not applicable / Not valid  -- Disagree - Clinically unable to determine / Unknown  -- Refer to Clinical Documentation Reviewer    PROVIDER RESPONSE TEXT:    This patient has colitis due to unclear etiology    Query created by:  Daiana Dominguez on 4/21/2021 1:21 PM      Electronically signed by:  Jailyn Branch MD 4/22/2021 10:11 AM

## 2021-05-26 VITALS
HEIGHT: 66 IN | SYSTOLIC BLOOD PRESSURE: 132 MMHG | HEART RATE: 83 BPM | TEMPERATURE: 98.4 F | RESPIRATION RATE: 17 BRPM | DIASTOLIC BLOOD PRESSURE: 84 MMHG | BODY MASS INDEX: 26.03 KG/M2 | OXYGEN SATURATION: 98 % | WEIGHT: 162 LBS

## 2021-11-07 ENCOUNTER — HOSPITAL ENCOUNTER (EMERGENCY)
Dept: CT IMAGING | Age: 56
Discharge: HOME | DRG: 065 | End: 2021-11-07
Attending: STUDENT IN AN ORGANIZED HEALTH CARE EDUCATION/TRAINING PROGRAM
Payer: MEDICARE

## 2021-11-07 ENCOUNTER — HOSPITAL ENCOUNTER (INPATIENT)
Age: 56
LOS: 11 days | Discharge: HOME | DRG: 065 | End: 2021-11-19
Attending: STUDENT IN AN ORGANIZED HEALTH CARE EDUCATION/TRAINING PROGRAM | Admitting: INTERNAL MEDICINE
Payer: MEDICARE

## 2021-11-07 DIAGNOSIS — R53.1 ACUTE RIGHT-SIDED WEAKNESS: ICD-10-CM

## 2021-11-07 DIAGNOSIS — I63.89 CEREBROVASCULAR ACCIDENT (CVA) DUE TO OTHER MECHANISM (HCC): ICD-10-CM

## 2021-11-07 DIAGNOSIS — R53.1 LEFT-SIDED WEAKNESS: Primary | ICD-10-CM

## 2021-11-07 PROBLEM — K52.9 ACUTE COLITIS: Status: RESOLVED | Noted: 2021-04-18 | Resolved: 2021-11-07

## 2021-11-07 PROBLEM — E86.0 DEHYDRATION: Status: ACTIVE | Noted: 2021-11-07

## 2021-11-07 PROBLEM — D70.9 NEUTROPENIA (HCC): Status: ACTIVE | Noted: 2021-11-07

## 2021-11-07 LAB
ALBUMIN SERPL-MCNC: 3 G/DL (ref 3.5–5)
ALBUMIN/GLOB SERPL: 0.5 {RATIO} (ref 1.1–2.2)
ALP SERPL-CCNC: 66 U/L (ref 45–117)
ALT SERPL-CCNC: 20 U/L (ref 12–78)
ANION GAP SERPL CALC-SCNC: 8 MMOL/L (ref 5–15)
APPEARANCE UR: CLEAR
AST SERPL-CCNC: 21 U/L (ref 15–37)
BACTERIA URNS QL MICRO: NEGATIVE /HPF
BASOPHILS # BLD: 0 K/UL (ref 0–0.1)
BASOPHILS NFR BLD: 0 % (ref 0–1)
BILIRUB SERPL-MCNC: 0.3 MG/DL (ref 0.2–1)
BILIRUB UR QL: NEGATIVE
BUN SERPL-MCNC: 14 MG/DL (ref 6–20)
BUN/CREAT SERPL: 17 (ref 12–20)
CALCIUM SERPL-MCNC: 8.6 MG/DL (ref 8.5–10.1)
CHLORIDE SERPL-SCNC: 103 MMOL/L (ref 97–108)
CO2 SERPL-SCNC: 29 MMOL/L (ref 21–32)
COLOR UR: NORMAL
CREAT SERPL-MCNC: 0.84 MG/DL (ref 0.55–1.02)
DIFFERENTIAL METHOD BLD: ABNORMAL
EOSINOPHIL # BLD: 0 K/UL (ref 0–0.4)
EOSINOPHIL NFR BLD: 0 % (ref 0–7)
EPITH CASTS URNS QL MICRO: NORMAL /LPF
ERYTHROCYTE [DISTWIDTH] IN BLOOD BY AUTOMATED COUNT: 12.6 % (ref 11.5–14.5)
GLOBULIN SER CALC-MCNC: 6.1 G/DL (ref 2–4)
GLUCOSE SERPL-MCNC: 101 MG/DL (ref 65–100)
GLUCOSE UR STRIP.AUTO-MCNC: NEGATIVE MG/DL
HCT VFR BLD AUTO: 39.2 % (ref 35–47)
HGB BLD-MCNC: 12.7 G/DL (ref 11.5–16)
HGB UR QL STRIP: NEGATIVE
IMM GRANULOCYTES # BLD AUTO: 0 K/UL (ref 0–0.04)
IMM GRANULOCYTES NFR BLD AUTO: 0 % (ref 0–0.5)
KETONES UR QL STRIP.AUTO: NEGATIVE MG/DL
LEUKOCYTE ESTERASE UR QL STRIP.AUTO: NEGATIVE
LYMPHOCYTES # BLD: 1.4 K/UL (ref 0.8–3.5)
LYMPHOCYTES NFR BLD: 43 % (ref 12–49)
MCH RBC QN AUTO: 30.3 PG (ref 26–34)
MCHC RBC AUTO-ENTMCNC: 32.4 G/DL (ref 30–36.5)
MCV RBC AUTO: 93.6 FL (ref 80–99)
MONOCYTES # BLD: 0.3 K/UL (ref 0–1)
MONOCYTES NFR BLD: 10 % (ref 5–13)
NEUTS SEG # BLD: 1.5 K/UL (ref 1.8–8)
NEUTS SEG NFR BLD: 46 % (ref 32–75)
NITRITE UR QL STRIP.AUTO: NEGATIVE
NRBC # BLD: 0 K/UL (ref 0–0.01)
NRBC BLD-RTO: 0 PER 100 WBC
PH UR STRIP: 7 [PH] (ref 5–8)
PLATELET # BLD AUTO: 228 K/UL (ref 150–400)
PMV BLD AUTO: 10.8 FL (ref 8.9–12.9)
POTASSIUM SERPL-SCNC: 3.6 MMOL/L (ref 3.5–5.1)
PROT SERPL-MCNC: 9.1 G/DL (ref 6.4–8.2)
PROT UR STRIP-MCNC: NEGATIVE MG/DL
RBC # BLD AUTO: 4.19 M/UL (ref 3.8–5.2)
RBC #/AREA URNS HPF: NORMAL /HPF (ref 0–5)
SODIUM SERPL-SCNC: 140 MMOL/L (ref 136–145)
SP GR UR REFRACTOMETRY: 1.01 (ref 1–1.03)
TROPONIN-HIGH SENSITIVITY: 6 NG/L (ref 0–51)
UR CULT HOLD, URHOLD: NORMAL
UROBILINOGEN UR QL STRIP.AUTO: 0.2 EU/DL (ref 0.2–1)
WBC # BLD AUTO: 3.3 K/UL (ref 3.6–11)
WBC URNS QL MICRO: NORMAL /HPF (ref 0–4)

## 2021-11-07 PROCEDURE — 81001 URINALYSIS AUTO W/SCOPE: CPT

## 2021-11-07 PROCEDURE — 70498 CT ANGIOGRAPHY NECK: CPT

## 2021-11-07 PROCEDURE — 84484 ASSAY OF TROPONIN QUANT: CPT

## 2021-11-07 PROCEDURE — 74011250636 HC RX REV CODE- 250/636: Performed by: STUDENT IN AN ORGANIZED HEALTH CARE EDUCATION/TRAINING PROGRAM

## 2021-11-07 PROCEDURE — 80053 COMPREHEN METABOLIC PANEL: CPT

## 2021-11-07 PROCEDURE — 4A03X5D MEASUREMENT OF ARTERIAL FLOW, INTRACRANIAL, EXTERNAL APPROACH: ICD-10-PCS | Performed by: STUDENT IN AN ORGANIZED HEALTH CARE EDUCATION/TRAINING PROGRAM

## 2021-11-07 PROCEDURE — 96375 TX/PRO/DX INJ NEW DRUG ADDON: CPT

## 2021-11-07 PROCEDURE — 74011000636 HC RX REV CODE- 636: Performed by: STUDENT IN AN ORGANIZED HEALTH CARE EDUCATION/TRAINING PROGRAM

## 2021-11-07 PROCEDURE — G0378 HOSPITAL OBSERVATION PER HR: HCPCS

## 2021-11-07 PROCEDURE — 85025 COMPLETE CBC W/AUTO DIFF WBC: CPT

## 2021-11-07 PROCEDURE — 74011250636 HC RX REV CODE- 250/636: Performed by: INTERNAL MEDICINE

## 2021-11-07 PROCEDURE — 70450 CT HEAD/BRAIN W/O DYE: CPT

## 2021-11-07 PROCEDURE — 99285 EMERGENCY DEPT VISIT HI MDM: CPT

## 2021-11-07 PROCEDURE — 96376 TX/PRO/DX INJ SAME DRUG ADON: CPT

## 2021-11-07 PROCEDURE — 93005 ELECTROCARDIOGRAM TRACING: CPT

## 2021-11-07 PROCEDURE — 96372 THER/PROPH/DIAG INJ SC/IM: CPT

## 2021-11-07 PROCEDURE — 74011250637 HC RX REV CODE- 250/637: Performed by: INTERNAL MEDICINE

## 2021-11-07 PROCEDURE — 96374 THER/PROPH/DIAG INJ IV PUSH: CPT

## 2021-11-07 RX ORDER — ONDANSETRON 4 MG/1
4 TABLET, ORALLY DISINTEGRATING ORAL
Status: DISCONTINUED | OUTPATIENT
Start: 2021-11-07 | End: 2021-11-19 | Stop reason: HOSPADM

## 2021-11-07 RX ORDER — HYDROXYCHLOROQUINE SULFATE 200 MG/1
200 TABLET, FILM COATED ORAL 2 TIMES DAILY
Status: DISCONTINUED | OUTPATIENT
Start: 2021-11-07 | End: 2021-11-19 | Stop reason: HOSPADM

## 2021-11-07 RX ORDER — ACETAMINOPHEN 325 MG/1
650 TABLET ORAL
Status: DISCONTINUED | OUTPATIENT
Start: 2021-11-07 | End: 2021-11-19 | Stop reason: HOSPADM

## 2021-11-07 RX ORDER — FAMOTIDINE 40 MG/1
40 TABLET, FILM COATED ORAL DAILY
COMMUNITY

## 2021-11-07 RX ORDER — SODIUM CHLORIDE 0.9 % (FLUSH) 0.9 %
5-40 SYRINGE (ML) INJECTION AS NEEDED
Status: DISCONTINUED | OUTPATIENT
Start: 2021-11-07 | End: 2021-11-19 | Stop reason: HOSPADM

## 2021-11-07 RX ORDER — SODIUM CHLORIDE 0.9 % (FLUSH) 0.9 %
5-40 SYRINGE (ML) INJECTION EVERY 8 HOURS
Status: DISCONTINUED | OUTPATIENT
Start: 2021-11-07 | End: 2021-11-19 | Stop reason: HOSPADM

## 2021-11-07 RX ORDER — LORAZEPAM 2 MG/ML
1 INJECTION INTRAMUSCULAR ONCE
Status: COMPLETED | OUTPATIENT
Start: 2021-11-07 | End: 2021-11-07

## 2021-11-07 RX ORDER — ACETAMINOPHEN 650 MG/1
650 SUPPOSITORY RECTAL
Status: DISCONTINUED | OUTPATIENT
Start: 2021-11-07 | End: 2021-11-19 | Stop reason: HOSPADM

## 2021-11-07 RX ORDER — PREDNISONE 5 MG/1
10 TABLET ORAL DAILY
Status: ON HOLD | COMMUNITY
End: 2021-11-08

## 2021-11-07 RX ORDER — GUAIFENESIN AND DEXTROMETHORPHAN HYDROBROMIDE 1200; 60 MG/1; MG/1
1 TABLET, EXTENDED RELEASE ORAL 2 TIMES DAILY
Status: ON HOLD | COMMUNITY
End: 2021-11-08

## 2021-11-07 RX ORDER — GUAIFENESIN 100 MG/5ML
81 LIQUID (ML) ORAL DAILY
Status: DISCONTINUED | OUTPATIENT
Start: 2021-11-08 | End: 2021-11-19 | Stop reason: HOSPADM

## 2021-11-07 RX ORDER — HYDRALAZINE HYDROCHLORIDE 20 MG/ML
20 INJECTION INTRAMUSCULAR; INTRAVENOUS ONCE
Status: COMPLETED | OUTPATIENT
Start: 2021-11-07 | End: 2021-11-07

## 2021-11-07 RX ORDER — ENOXAPARIN SODIUM 100 MG/ML
40 INJECTION SUBCUTANEOUS EVERY 24 HOURS
Status: DISCONTINUED | OUTPATIENT
Start: 2021-11-07 | End: 2021-11-19 | Stop reason: HOSPADM

## 2021-11-07 RX ORDER — LABETALOL HCL 20 MG/4 ML
20 SYRINGE (ML) INTRAVENOUS
Status: DISCONTINUED | OUTPATIENT
Start: 2021-11-07 | End: 2021-11-19 | Stop reason: HOSPADM

## 2021-11-07 RX ORDER — AMLODIPINE BESYLATE 5 MG/1
10 TABLET ORAL DAILY
Status: DISCONTINUED | OUTPATIENT
Start: 2021-11-07 | End: 2021-11-19 | Stop reason: HOSPADM

## 2021-11-07 RX ORDER — POLYETHYLENE GLYCOL 3350 17 G/17G
17 POWDER, FOR SOLUTION ORAL DAILY PRN
Status: DISCONTINUED | OUTPATIENT
Start: 2021-11-07 | End: 2021-11-19 | Stop reason: HOSPADM

## 2021-11-07 RX ORDER — PANTOPRAZOLE SODIUM 20 MG/1
20 TABLET, DELAYED RELEASE ORAL
Status: DISCONTINUED | OUTPATIENT
Start: 2021-11-07 | End: 2021-11-07

## 2021-11-07 RX ORDER — PREGABALIN 150 MG/1
150 CAPSULE ORAL 2 TIMES DAILY
COMMUNITY

## 2021-11-07 RX ORDER — TRAMADOL HYDROCHLORIDE 50 MG/1
50 TABLET ORAL
COMMUNITY

## 2021-11-07 RX ORDER — ATORVASTATIN CALCIUM 20 MG/1
80 TABLET, FILM COATED ORAL DAILY
Status: DISCONTINUED | OUTPATIENT
Start: 2021-11-08 | End: 2021-11-19 | Stop reason: HOSPADM

## 2021-11-07 RX ORDER — AMLODIPINE BESYLATE 5 MG/1
10 TABLET ORAL DAILY
Status: DISCONTINUED | OUTPATIENT
Start: 2021-11-08 | End: 2021-11-07

## 2021-11-07 RX ORDER — PANTOPRAZOLE SODIUM 40 MG/1
40 TABLET, DELAYED RELEASE ORAL
Status: DISCONTINUED | OUTPATIENT
Start: 2021-11-08 | End: 2021-11-08

## 2021-11-07 RX ORDER — ONDANSETRON 2 MG/ML
4 INJECTION INTRAMUSCULAR; INTRAVENOUS
Status: DISCONTINUED | OUTPATIENT
Start: 2021-11-07 | End: 2021-11-19 | Stop reason: HOSPADM

## 2021-11-07 RX ORDER — LANOLIN ALCOHOL/MO/W.PET/CERES
400 CREAM (GRAM) TOPICAL
COMMUNITY
End: 2021-11-19

## 2021-11-07 RX ORDER — ENOXAPARIN SODIUM 100 MG/ML
40 INJECTION SUBCUTANEOUS DAILY
Status: DISCONTINUED | OUTPATIENT
Start: 2021-11-08 | End: 2021-11-07

## 2021-11-07 RX ADMIN — ACETAMINOPHEN 650 MG: 325 TABLET ORAL at 21:36

## 2021-11-07 RX ADMIN — HYDROXYCHLOROQUINE SULFATE 200 MG: 200 TABLET ORAL at 17:46

## 2021-11-07 RX ADMIN — AMLODIPINE BESYLATE 10 MG: 5 TABLET ORAL at 15:30

## 2021-11-07 RX ADMIN — LABETALOL HYDROCHLORIDE 20 MG: 5 INJECTION, SOLUTION INTRAVENOUS at 18:59

## 2021-11-07 RX ADMIN — ACETAMINOPHEN 650 MG: 325 TABLET ORAL at 15:31

## 2021-11-07 RX ADMIN — Medication 10 ML: at 17:47

## 2021-11-07 RX ADMIN — IOPAMIDOL 100 ML: 755 INJECTION, SOLUTION INTRAVENOUS at 13:24

## 2021-11-07 RX ADMIN — LORAZEPAM 1 MG: 2 INJECTION INTRAMUSCULAR; INTRAVENOUS at 14:29

## 2021-11-07 RX ADMIN — LABETALOL HYDROCHLORIDE 20 MG: 5 INJECTION, SOLUTION INTRAVENOUS at 23:54

## 2021-11-07 RX ADMIN — ENOXAPARIN SODIUM 40 MG: 100 INJECTION SUBCUTANEOUS at 15:30

## 2021-11-07 RX ADMIN — ONDANSETRON 4 MG: 2 INJECTION INTRAMUSCULAR; INTRAVENOUS at 21:42

## 2021-11-07 RX ADMIN — HYDRALAZINE HYDROCHLORIDE 20 MG: 20 INJECTION INTRAMUSCULAR; INTRAVENOUS at 13:50

## 2021-11-07 NOTE — H&P
[facility]    Hospitalist Admission Note      NAME:  Lencho Wheat   :   1965   MRN:  [id number]     PCP:  Radha Gallo MD     Date/Time of service:  2021 2:22 PM          Subjective:     CHIEF COMPLAINT: right side weak     HISTORY OF PRESENT ILLNESS:     Ms. Best Ross is a 55 y.o.  female who presented to the Emergency Department complaining of right side weakness. She is unalbe to give much hx since she got ativan at the other ERR. Family gives some hx. Persistent over about 24hr. Both leg and arm, but not face. Hx of lupus and pulmonary HTN. Visiting from [place]. CTA in ER is unremarkable. We will admit her for management. Past Medical History:   Diagnosis Date    Anemia     Arm vein blood clot, left     History of colon polyps     Lupus (HCC)     Neutropenia (HCC)     Pulmonary hypertension (HCC)         Past Surgical History:   Procedure Laterality Date    COLONOSCOPY N/A 2021    COLONOSCOPY performed by Ash Mon MD at [address] [facility] HX [address] [facility] Left     HX COLONOSCOPY         Social History     Tobacco Use    Smoking status: Never Smoker    Smokeless tobacco: Never Used   Substance Use Topics    Alcohol use: Never        Family History   Problem Relation Age of Onset    Heart Disease Neg Hx       Family hx cannot be fully assessed, since the patient cannot provide information    Allergies   Allergen Reactions    Lotrel [Amlodipine-Benazepril] Anaphylaxis     Tongue swelling    Sudafed [Pseudoephedrine Hcl] Anaphylaxis    Codeine Other (comments)     Pt does not recall    Doxycycline Hives        Prior to Admission medications    Medication Sig Start Date End Date Taking? Authorizing Provider   magnesium oxide (MAG-OX) 400 mg tablet Take 400 mg by mouth nightly. Yes Provider, Historical   traMADoL (ULTRAM) 50 mg tablet Take 50 mg by mouth every six (6) hours as needed for Pain.    Yes Provider, Historical pregabalin (LYRICA) 150 mg capsule Take 150 mg by mouth two (2) times a day. Indications: pain   Yes Provider, Historical   famotidine (PEPCID) 40 mg tablet Take 40 mg by mouth daily. Indications: gastroesophageal reflux disease   Yes Provider, Historical   predniSONE (DELTASONE) 5 mg tablet Take 5 mg by mouth daily. Indications: lupus   Yes Provider, Historical   dextromethorphan-guaiFENesin (Mucinex DM) 60-1,200 mg Tb12 Take 1 Tablet by mouth two (2) times a day. Indications: cough   Yes Provider, Historical   traZODone (DESYREL) 150 mg tablet Take 1 Tab by mouth nightly. 4/22/21  Yes Do, Long LUCIA MD   atorvastatin (LIPITOR) 10 mg tablet Take 10 mg by mouth daily. Yes Provider, Historical   LORazepam (ATIVAN) 0.5 mg tablet Take 0.5 mg by mouth every six (6) hours as needed for Anxiety. Yes Provider, Historical   alendronate (FOSAMAX) 70 mg tablet Take 70 mg by mouth every Monday. Yes Provider, Historical   potassium chloride (Klor-Con M20) 20 mEq tablet Take 20 mEq by mouth daily as needed. Yes Provider, Historical   macitentan (OPSUMIT) 10 mg tab Take 10 mg by mouth daily. Yes Other, MD Robbin   furosemide (Lasix) 40 mg tablet Take 40 mg by mouth daily as needed (\"for swelling\"). Yes Other, MD Robbin   EPINEPHrine (EPIPEN) 0.3 mg/0.3 mL injection 0.3 mg by IntraMUSCular route once as needed for Allergic Response.     Provider, Historical       Review of Systems:  (bold if positive, if negative)    Gen:  Eyes:  ENT:  CVS:  Pulm:  GI:  :  MS:  Skin:  Psych:  Endo:  Hem:  Renal:  Neuro:  weakness      Objective:      VITALS:    Vital signs reviewed; most recent are:    Visit Vitals  BP (!) 158/98 (BP 1 Location: Right upper arm, BP Patient Position: At rest)   Pulse 99   Temp 99.4 °F (37.4 °C)   Resp 18   Ht 5' 7\" (1.702 m)   Wt 74.4 kg (164 lb)   SpO2 98%   BMI 25.69 kg/m²     SpO2 Readings from Last 6 Encounters:   11/07/21 98%   04/22/21 98%   04/03/19 98%        No intake or output data in the 24 hours ending 11/07/21 1946     Exam:     Physical Exam:    Gen:  Well-developed, well-nourished, in no acute distress  HEENT:  Pink conjunctivae, PERRL, hearing intact to voice, moist mucous membranes  Neck:  Supple, without masses, thyroid non-tender  Resp:  No accessory muscle use, clear breath sounds without wheezes rales or rhonchi  Card:  No murmurs, tachycardic S1, S2 without thrills, bruits, 3+ LE peripheral edema  Abd:  Soft, non-tender, non-distended, normoactive bowel sounds are present, no mass  Lymph:  No cervical or inguinal adenopathy  Musc:  No cyanosis or clubbing  Skin:  No rashes or ulcers, skin turgor is good  Neuro:  Cranial nerves are grossly intact, R side motor weakness, follows commands   Psych:  Good insight, oriented to person, place and time, alert     Labs:    Recent Labs     11/07/21  1306   WBC 3.3*   HGB 12.7   HCT 39.2        Recent Labs     11/07/21  1306      K 3.6      CO2 29   *   BUN 14   CREA 0.84   CA 8.6   ALB 3.0*   TBILI 0.3   ALT 20     No results found for: GLUCPOC  No results for input(s): PH, PCO2, PO2, HCO3, FIO2 in the last 72 hours. No results for input(s): INR, INREXT, INREXT in the last 72 hours. All Micro Results     Procedure Component Value Units Date/Time    URINE CULTURE HOLD SAMPLE [[id number]] Collected: 11/07/21 1430    Order Status: Completed Specimen: Urine from Serum Updated: 11/07/21 1435     Urine culture hold       Urine on hold in Microbiology dept for 2 days. If unpreserved urine is submitted, it cannot be used for addtional testing after 24 hours, recollection will be required. I have reviewed previous records       Assessment and Plan:      Right sided weakness - Admit to OBS on tele. TIA/CVA workup. Neuro consult and neurochecks. MRI brain. She has had prior ECHOs of heart. Check Lipids, TSH and A1c. Start ASA and lovenox. Check drug screen. Fall precautions and PT/OT eval.  Increase atorvastatin to 80. Hold trazodone and lorazepam    Lupus erythematosus - Continue plaquenil    Pulmonary hypertension / Bilateral lower extremity edema - Hold lasix due to dehydration. Give norvasc    Neutropenia - POA and chronic based on chart values. Suspect this is due to Lupus    Dehydration - POA based on BUN/Cr. Hydrate and hold lasix. GERD (gastroesophageal reflux disease) - PPI    Osteopenia - hold alendronate     Telemetry reviewed:   normal sinus rhythm    Risk of deterioration: high      Total time spent with patient: 48 Minutes I personally reviewed chart, notes, data and current medications in the medical record. I have personally examined and treated the patient at bedside during this period.                  Care Plan discussed with: Patient, Nursing Staff and >50% of time spent in counseling and coordination of care    Discussed:  Care Plan       ___________________________________________________    Attending Physician: Arnoldo Shepherd MD

## 2021-11-07 NOTE — ED PROVIDER NOTES
Patient is a 29-year-old female presenting to emergency department with right lower extremity weakness. Patient states that last well-known was at approximate 1130 yesterday when she was walking noticed that she was dragging her right foot she said that the symptoms not resolve that she started to feel like she was off balance continue to have episodes where she was going to fall towards the right. Patient says that when she woke up today she noticed that she was having weakness in her entire right leg as well as her right arm. On arrival patient's blood pressure was 206/106. Patient denies any chest pain, shortness of breath, headaches, blurry vision speech difficulty. Patient has a past medical history of lupus and pulmonary hypertension. Patient said that she went out to [address] researching her symptoms and was concerned that she was having either a stroke or MS.            Past Medical History:   Diagnosis Date    Anemia     Arm vein blood clot, left     History of colon polyps     Lupus (HCC)     Pulmonary hypertension (HCC)        Past Surgical History:   Procedure Laterality Date    COLONOSCOPY N/A 4/21/2021    COLONOSCOPY performed by Christos Galindo MD at [address] [facility] HX [address] [facility] Left     HX COLONOSCOPY           Family History:   Problem Relation Age of Onset    Heart Disease Neg Hx        Social History     Socioeconomic History    Marital status: SINGLE     Spouse name: Not on file    Number of children: Not on file    Years of education: Not on file    Highest education level: Not on file   Occupational History    Not on file   Tobacco Use    Smoking status: Never Smoker    Smokeless tobacco: Never Used   Substance and Sexual Activity    Alcohol use: Never    Drug use: Never    Sexual activity: Not on file   Other Topics Concern    Not on file   Social History Narrative    Not on file     Social Determinants of Health     Financial Resource Strain:     Difficulty of Paying Living Expenses: Not on file   Food Insecurity:     Worried About Running Out of Food in the Last Year: Not on file    Ran Out of Food in the Last Year: Not on file   Transportation Needs:     Lack of Transportation (Medical): Not on file    Lack of Transportation (Non-Medical): Not on file   Physical Activity:     Days of Exercise per Week: Not on file    Minutes of Exercise per Session: Not on file   Stress:     Feeling of Stress : Not on file   Social Connections:     Frequency of Communication with Friends and Family: Not on file    Frequency of Social Gatherings with Friends and Family: Not on file    Attends Restorationist Services: Not on file    Active Member of [address] [place] [address] or Organizations: Not on file    Attends Club or Organization Meetings: Not on file    Marital Status: Not on file   Intimate Partner Violence:     Fear of Current or Ex-Partner: Not on file    Emotionally Abused: Not on file    Physically Abused: Not on file    Sexually Abused: Not on file   Housing Stability:     Unable to Pay for Housing in the Last Year: Not on file    Number of [place] in the Last Year: Not on file    Unstable Housing in the Last Year: Not on file         ALLERGIES: Lotrel [amlodipine-benazepril], Sudafed [pseudoephedrine hcl], Codeine, and Doxycycline    Review of Systems   Neurological: Positive for weakness. Negative for dizziness, facial asymmetry, speech difficulty, light-headedness, numbness and headaches. All other systems reviewed and are negative. Vitals:    11/07/21 1247 11/07/21 1303 11/07/21 1334   BP: (!) 206/106 (!) 217/115 (!) 227/96   Pulse: 65 62 68   Resp: 16 16 28   Temp: 96.9 °F (36.1 °C)     SpO2: 99% 100% 100%   Weight: 74.4 kg (164 lb)     Height: 5' 7\" (1.702 m)              Physical Exam  Vitals and nursing note reviewed. Constitutional:       Appearance: Normal appearance. HENT:      Head: Normocephalic and atraumatic.    Eyes:      Extraocular Movements: Extraocular movements intact. Pupils: Pupils are equal, round, and reactive to light. Cardiovascular:      Rate and Rhythm: Normal rate and regular rhythm. Pulses: Normal pulses. Heart sounds: Normal heart sounds. Pulmonary:      Effort: Pulmonary effort is normal.      Breath sounds: Normal breath sounds. Abdominal:      General: Abdomen is flat. Palpations: Abdomen is soft. Musculoskeletal:         General: Normal range of motion. Cervical back: Normal range of motion and neck supple. Skin:     General: Skin is warm and dry. Neurological:      Mental Status: She is alert and oriented to person, place, and time. Cranial Nerves: Cranial nerve deficit present. Sensory: No sensory deficit. Motor: Weakness present. Coordination: Coordination abnormal.      Gait: Gait abnormal.   Psychiatric:         Mood and Affect: Mood is anxious. Affect is tearful. MDM  Number of Diagnoses or Management Options  Acute right-sided weakness  Diagnosis management comments: A/P: CVA. 36-year-old female present emergency department with right-sided lower extremity weakness now that is progressed to right upper extremity weakness as well. Concern for delayed presentation of CVA as last well-known was at approximate 1130 yesterday. Patient also hypertensive on arrival of 206/106. Will obtain CT head without contrast, CTA head and neck. EKG, labs. Patient has anaphylaxis to amlodipine-Benzapril will give dose of hydralazine now. Amount and/or Complexity of Data Reviewed  Clinical lab tests: ordered and reviewed  Tests in the radiology section of CPT®: reviewed and ordered    Risk of Complications, Morbidity, and/or Mortality  Presenting problems: moderate  Diagnostic procedures: moderate  Management options: moderate           Procedures    ED EKG interpretation:  Rhythm: sinus bradycardia; and regular .  Rate (approx.): 58; Axis: left axis deviation; P wave: normal; QRS interval: normal ; ST/T wave: normal; in  Lead: II ; Other findings: borderline ekg. EKG documented by Chio Kemp MD,     Perfect Serve Consult for Admission  2:02 PM    ED Room Number: Ellie Hernandez 1615  Patient Name and age:  Shelli Osei 55 y.o.  female  Working Diagnosis:   1. Acute right-sided weakness        COVID-19 Suspicion:  no  Sepsis present:  no  Reassessment needed: yes  Code Status:  Full Code  Readmission: no  Isolation Requirements:  no  Recommended Level of Care:  telemetry  Department:[place] [facility] - [phone]  Other: Total critical care time spent exclusive of procedures:  55 min.

## 2021-11-07 NOTE — ED TRIAGE NOTES
Pt noticed it was hard to move her right leg yesterday am. As the day went on, her legs became numb and her right arm also became hard to move. Pt was able to walk this AM but her right leg is dragging.

## 2021-11-08 ENCOUNTER — APPOINTMENT (OUTPATIENT)
Dept: MRI IMAGING | Age: 56
DRG: 065 | End: 2021-11-08
Attending: INTERNAL MEDICINE
Payer: MEDICARE

## 2021-11-08 PROBLEM — I63.9 CVA (CEREBRAL VASCULAR ACCIDENT) (HCC): Status: ACTIVE | Noted: 2021-11-08

## 2021-11-08 LAB
ALBUMIN SERPL-MCNC: 3 G/DL (ref 3.5–5)
ALBUMIN/GLOB SERPL: 0.5 {RATIO} (ref 1.1–2.2)
ALP SERPL-CCNC: 73 U/L (ref 45–117)
ALT SERPL-CCNC: 11 U/L (ref 12–78)
ANION GAP SERPL CALC-SCNC: 6 MMOL/L (ref 5–15)
AST SERPL-CCNC: 24 U/L (ref 15–37)
ATRIAL RATE: 58 BPM
BILIRUB SERPL-MCNC: 0.6 MG/DL (ref 0.2–1)
BUN SERPL-MCNC: 10 MG/DL (ref 6–20)
BUN/CREAT SERPL: 14 (ref 12–20)
CALCIUM SERPL-MCNC: 8.7 MG/DL (ref 8.5–10.1)
CALCULATED P AXIS, ECG09: 40 DEGREES
CALCULATED R AXIS, ECG10: -34 DEGREES
CALCULATED T AXIS, ECG11: 44 DEGREES
CHLORIDE SERPL-SCNC: 101 MMOL/L (ref 97–108)
CHOLEST SERPL-MCNC: 172 MG/DL
CO2 SERPL-SCNC: 29 MMOL/L (ref 21–32)
CREAT SERPL-MCNC: 0.69 MG/DL (ref 0.55–1.02)
DIAGNOSIS, 93000: NORMAL
ERYTHROCYTE [DISTWIDTH] IN BLOOD BY AUTOMATED COUNT: 12.4 % (ref 11.5–14.5)
EST. AVERAGE GLUCOSE BLD GHB EST-MCNC: 114 MG/DL
GLOBULIN SER CALC-MCNC: 6 G/DL (ref 2–4)
GLUCOSE SERPL-MCNC: 111 MG/DL (ref 65–100)
HBA1C MFR BLD: 5.6 % (ref 4–5.6)
HCT VFR BLD AUTO: 41.4 % (ref 35–47)
HDLC SERPL-MCNC: 61 MG/DL
HDLC SERPL: 2.8 {RATIO} (ref 0–5)
HGB BLD-MCNC: 13.5 G/DL (ref 11.5–16)
LDLC SERPL CALC-MCNC: 84 MG/DL (ref 0–100)
MAGNESIUM SERPL-MCNC: 1.6 MG/DL (ref 1.6–2.4)
MCH RBC QN AUTO: 30.1 PG (ref 26–34)
MCHC RBC AUTO-ENTMCNC: 32.6 G/DL (ref 30–36.5)
MCV RBC AUTO: 92.2 FL (ref 80–99)
NRBC # BLD: 0 K/UL (ref 0–0.01)
NRBC BLD-RTO: 0 PER 100 WBC
P-R INTERVAL, ECG05: 190 MS
PLATELET # BLD AUTO: 220 K/UL (ref 150–400)
PMV BLD AUTO: 10.3 FL (ref 8.9–12.9)
POTASSIUM SERPL-SCNC: 2.8 MMOL/L (ref 3.5–5.1)
PROT SERPL-MCNC: 9 G/DL (ref 6.4–8.2)
Q-T INTERVAL, ECG07: 444 MS
QRS DURATION, ECG06: 98 MS
QTC CALCULATION (BEZET), ECG08: 435 MS
RBC # BLD AUTO: 4.49 M/UL (ref 3.8–5.2)
SODIUM SERPL-SCNC: 136 MMOL/L (ref 136–145)
TRIGL SERPL-MCNC: 135 MG/DL (ref ?–150)
VENTRICULAR RATE, ECG03: 58 BPM
VLDLC SERPL CALC-MCNC: 27 MG/DL
WBC # BLD AUTO: 3.4 K/UL (ref 3.6–11)

## 2021-11-08 PROCEDURE — 97530 THERAPEUTIC ACTIVITIES: CPT

## 2021-11-08 PROCEDURE — 97163 PT EVAL HIGH COMPLEX 45 MIN: CPT

## 2021-11-08 PROCEDURE — 80053 COMPREHEN METABOLIC PANEL: CPT

## 2021-11-08 PROCEDURE — 97165 OT EVAL LOW COMPLEX 30 MIN: CPT

## 2021-11-08 PROCEDURE — 92610 EVALUATE SWALLOWING FUNCTION: CPT

## 2021-11-08 PROCEDURE — 74011250636 HC RX REV CODE- 250/636: Performed by: INTERNAL MEDICINE

## 2021-11-08 PROCEDURE — 70551 MRI BRAIN STEM W/O DYE: CPT

## 2021-11-08 PROCEDURE — 36415 COLL VENOUS BLD VENIPUNCTURE: CPT

## 2021-11-08 PROCEDURE — 83036 HEMOGLOBIN GLYCOSYLATED A1C: CPT

## 2021-11-08 PROCEDURE — 65660000000 HC RM CCU STEPDOWN

## 2021-11-08 PROCEDURE — 85027 COMPLETE CBC AUTOMATED: CPT

## 2021-11-08 PROCEDURE — 83735 ASSAY OF MAGNESIUM: CPT

## 2021-11-08 PROCEDURE — 99223 1ST HOSP IP/OBS HIGH 75: CPT | Performed by: PSYCHIATRY & NEUROLOGY

## 2021-11-08 PROCEDURE — 74011250637 HC RX REV CODE- 250/637: Performed by: INTERNAL MEDICINE

## 2021-11-08 PROCEDURE — 80061 LIPID PANEL: CPT

## 2021-11-08 PROCEDURE — G0378 HOSPITAL OBSERVATION PER HR: HCPCS

## 2021-11-08 PROCEDURE — 96372 THER/PROPH/DIAG INJ SC/IM: CPT

## 2021-11-08 PROCEDURE — 96376 TX/PRO/DX INJ SAME DRUG ADON: CPT

## 2021-11-08 RX ORDER — POTASSIUM CHLORIDE 750 MG/1
40 TABLET, FILM COATED, EXTENDED RELEASE ORAL
Status: COMPLETED | OUTPATIENT
Start: 2021-11-08 | End: 2021-11-08

## 2021-11-08 RX ORDER — PREDNISONE 10 MG/1
10 TABLET ORAL
Status: ON HOLD | COMMUNITY
End: 2021-11-19 | Stop reason: SDUPTHER

## 2021-11-08 RX ORDER — TRAZODONE HYDROCHLORIDE 150 MG/1
300 TABLET ORAL
COMMUNITY

## 2021-11-08 RX ORDER — OMEPRAZOLE 40 MG/1
40 CAPSULE, DELAYED RELEASE ORAL DAILY
COMMUNITY

## 2021-11-08 RX ORDER — PANTOPRAZOLE SODIUM 40 MG/1
40 TABLET, DELAYED RELEASE ORAL
Status: DISCONTINUED | OUTPATIENT
Start: 2021-11-09 | End: 2021-11-19 | Stop reason: HOSPADM

## 2021-11-08 RX ORDER — PREDNISONE 5 MG/1
10 TABLET ORAL
Status: DISCONTINUED | OUTPATIENT
Start: 2021-11-09 | End: 2021-11-19 | Stop reason: HOSPADM

## 2021-11-08 RX ORDER — TRAZODONE HYDROCHLORIDE 100 MG/1
300 TABLET ORAL
Status: DISCONTINUED | OUTPATIENT
Start: 2021-11-08 | End: 2021-11-19 | Stop reason: HOSPADM

## 2021-11-08 RX ORDER — TRAMADOL HYDROCHLORIDE 50 MG/1
50 TABLET ORAL
Status: DISCONTINUED | OUTPATIENT
Start: 2021-11-08 | End: 2021-11-19 | Stop reason: HOSPADM

## 2021-11-08 RX ADMIN — TRAZODONE HYDROCHLORIDE 300 MG: 100 TABLET ORAL at 21:41

## 2021-11-08 RX ADMIN — PANTOPRAZOLE SODIUM 40 MG: 40 TABLET, DELAYED RELEASE ORAL at 05:40

## 2021-11-08 RX ADMIN — LABETALOL HYDROCHLORIDE 20 MG: 5 INJECTION, SOLUTION INTRAVENOUS at 10:45

## 2021-11-08 RX ADMIN — Medication 10 ML: at 14:41

## 2021-11-08 RX ADMIN — ATORVASTATIN CALCIUM 80 MG: 20 TABLET, FILM COATED ORAL at 08:46

## 2021-11-08 RX ADMIN — ACETAMINOPHEN 650 MG: 325 TABLET ORAL at 23:19

## 2021-11-08 RX ADMIN — HYDROXYCHLOROQUINE SULFATE 200 MG: 200 TABLET ORAL at 08:46

## 2021-11-08 RX ADMIN — HYDROXYCHLOROQUINE SULFATE 200 MG: 200 TABLET ORAL at 17:42

## 2021-11-08 RX ADMIN — ACETAMINOPHEN 650 MG: 325 TABLET ORAL at 16:14

## 2021-11-08 RX ADMIN — ASPIRIN 81 MG: 81 TABLET, CHEWABLE ORAL at 08:46

## 2021-11-08 RX ADMIN — Medication 10 ML: at 21:43

## 2021-11-08 RX ADMIN — ENOXAPARIN SODIUM 40 MG: 100 INJECTION SUBCUTANEOUS at 14:41

## 2021-11-08 RX ADMIN — POTASSIUM CHLORIDE 40 MEQ: 750 TABLET, FILM COATED, EXTENDED RELEASE ORAL at 07:40

## 2021-11-08 RX ADMIN — ACETAMINOPHEN 650 MG: 325 TABLET ORAL at 03:49

## 2021-11-08 RX ADMIN — AMLODIPINE BESYLATE 10 MG: 5 TABLET ORAL at 08:46

## 2021-11-08 RX ADMIN — ACETAMINOPHEN 650 MG: 325 TABLET ORAL at 10:52

## 2021-11-08 RX ADMIN — PREGABALIN 150 MG: 100 CAPSULE ORAL at 17:43

## 2021-11-08 NOTE — PROGRESS NOTES
TRANSFER - IN REPORT:    Verbal report received from Zaire joaquin RN(name) on Paola Charles  being received from [facility] ED(unit) for routine progression of care      Report consisted of patients Situation, Background, Assessment and   Recommendations(SBAR). Information from the following report(s) SBAR, Kardex, ED Summary, MAR, Recent Results and Cardiac Rhythm SR/ST was reviewed with the receiving nurse. Opportunity for questions and clarification was provided. Assessment completed upon patients arrival to unit and care assumed. This patient was assisted with Intentional Toileting every 2 hours during this shift as appropriate. Documentation of ambulation and output reflected on Flowsheet as appropriate. Purposeful hourly rounding was completed using AIDET and 5Ps. Outcomes of PHR documented as they occurred. Bed alarm in use as appropriate. Dual Suction and ambubag in place. Stroke Education provided to patient and the following topics were discussed    1. Patients personal risk factors for stroke are hypertension and Other Lupus    2. Warning signs of Stroke:        * Sudden numbness or weakness of the face, arm or leg, especially on one side of          The body            * Sudden confusion, trouble speaking or understanding        * Sudden trouble seeing in one or both eyes        * Sudden trouble walking, dizziness, loss of balance or coordination        * Sudden severe headache with no known cause      3. Importance of activation Emergency Medical Services ( 9-1-1 ) immediately if experience any warning signs of stroke. 4. Be sure and schedule a follow-up appointment with your primary care doctor or any specialists as instructed. 5. You must take medicine every day to treat your risk factors for stroke. Be sure to take your medicines exactly as your doctor tells you: no more, no less. Know what your medicines are for , what they do.   Anti-thrombotics /anticoagulants can help prevent strokes. You are taking the following medicine(s) Aspirin and Lipitor    6. Smoking and second-hand smoke greatly increase your risk of stroke, cardiovascular disease and death. Smoking history never    7. Information provided was [facility] Stroke Education Binder    8. Documentation of teaching completed in Patient Education Activity and on Care Plan with teaching response noted? yes    Bedside and Verbal shift change report given to Rita Jaimes RN (oncoming nurse) by Rishi Valadez (offgoing nurse). Report included the following information SBAR, Kardex, ED Summary, MAR, Recent Results and Cardiac Rhythm SR/ST.

## 2021-11-08 NOTE — PROGRESS NOTES
0700: Bedside and Verbal shift change report given to Ed Fortune (oncoming nurse) by Trista Shukla RN (offgoing nurse). Report included the following information SBAR, Kardex, Accordion and Cardiac Rhythm NSR. Stroke Education provided to patient and the following topics were discussed    1. Patients personal risk factors for stroke are hypertension    2. Warning signs of Stroke:        * Sudden numbness or weakness of the face, arm or leg, especially on one side of          The body            * Sudden confusion, trouble speaking or understanding        * Sudden trouble seeing in one or both eyes        * Sudden trouble walking, dizziness, loss of balance or coordination        * Sudden severe headache with no known cause      3. Importance of activation Emergency Medical Services ( 9-1-1 ) immediately if experience any warning signs of stroke. 4. Be sure and schedule a follow-up appointment with your primary care doctor or any specialists as instructed. 5. You must take medicine every day to treat your risk factors for stroke. Be sure to take your medicines exactly as your doctor tells you: no more, no less. Know what your medicines are for , what they do. Anti-thrombotics /anticoagulants can help prevent strokes. You are taking the following medicine(s)  Aspirin 81mg, lovenox     6. Smoking and second-hand smoke greatly increase your risk of stroke, cardiovascular disease and death. Smoking history never    7. Information provided was [facility] Stroke Education Xander Nail or Verbal Education    8. Documentation of teaching completed in Patient Education Activity and on Care Plan with teaching response noted? yes      This patient was assisted with Intentional Toileting every 2 hours during this shift as appropriate. Documentation of ambulation and output reflected on Flowsheet as appropriate. Purposeful hourly rounding was completed using AIDET and 5Ps. Outcomes of PHR documented as they occurred.  Bed alarm in use as appropriate. Dual Suction and ambubag in place.

## 2021-11-08 NOTE — PROGRESS NOTES
Reason for Admission:  Right sided weakness. Hx - anemia, left arm clot, lupus, pulmonary hypertension. COVID19 Vaccine:  yes       type:  Bryant Gonzalez    Date completed: January, 2021                   RUR Score:   observation                  Plan for utilizing home health:   None, no DME       PCP: First and Last name:  Robbin Valero MD - NO PCP     Name of Practice:    Are you a current patient: Yes/No:    Approximate date of last visit:    Can you participate in a virtual visit with your PCP:                     Current Advanced Directive/Advance Care Plan: Full Code    Healthcare Decision Maker:   Click here to complete [address] including selection of the Healthcare Decision Maker Relationship (ie \"Primary\")           Sister - Jailyn Route  [phone]  Daughter - Randal Galicia [phone]                  Transition of Care Plan:                 Chart reviewed, demographics verified. CM role and follow up discussed. Met with patient at bedside, face mask on. Patient lives with her adult daughter. Patient has prescription drug coverage, uses [facility] [employer] Patient is independent, drives, and provides self care. Patient performs ADLs independently. Current status:  Patient currently requiring medical management including monitoring of blood pressure. Brain MRI today. Potassium replete today. PLAN:  1. Monitor patient response to treatment and recommendations. 2. Medical management continues. 3. Patient home with family assist at DC. 4. Patient transport home per family or friend Maria Elena at discharge. 5. CM to monitor clinical progress and disposition recommendations.      Care Management Interventions  PCP Verified by CM:  (No current PCP)  Transition of Care Consult (CM Consult): Discharge Planning  Support Systems: Child(ray)  Confirm Follow Up Transport: Family  The Plan for Transition of Care is Related to the Following Treatment Goals : return home at DC  Discharge Location  Discharge Placement: Home with family assistance    Shiar Nelson, RN, MSN, Care manager

## 2021-11-08 NOTE — PROGRESS NOTES
Problem: Mobility Impaired (Adult and Pediatric)  Goal: *Acute Goals and Plan of Care (Insert Text)  Description: FUNCTIONAL STATUS PRIOR TO ADMISSION: Patient was independent and active without use of DME.    HOME SUPPORT PRIOR TO ADMISSION: Patient normally lives in [place] with daughter. Does not require an assistive device, Independent with all ADL's, and was driving. Currently here in [place] visiting sister and friend. She is staying with friend    Physical Therapy Goals  Initiated 11/8/2021  1. Patient will move from supine to sit and sit to supine  in bed with minimal assistance/contact guard assist within 7 day(s). 2.  Patient will transfer from bed to chair and chair to bed with minimal assistance/contact guard assist using the least restrictive device within 7 day(s). 3.  Patient will perform sit to stand with minimal assistance/contact guard assist within 7 day(s). 4.  Patient will ambulate with minimal assistance/contact guard assist for 150 feet with the least restrictive device within 7 day(s). Note:   PHYSICAL THERAPY EVALUATION- NEURO POPULATION  Patient: Pablo Crowe (21 y.o. female)  Date: 11/8/2021  Primary Diagnosis: Right sided weakness [R53.1]        Precautions:   Fall      ASSESSMENT  Based on the objective data described below, the patient presents with right sided weakness, background history is not fully accurate. Patient underwent CT which was negative and awaiting MRI. With interview patient is not engaging or answering questions, questions answered by sister. She keeps eyes open throughout session. Patient today with inconsistent presentation between MMT and functional performance. She is unable to elicit LE movement against gravity in all major muscle groups, but in standing and with transfer to chair no incidence of buckling. Patient is resistant to Right UE PROM and states due to \"arthritis\". Patient does not volitionally use of functional activities.    Patient required MOD A for bed mobility and stand pivot to chair with 1 person assist.   Radha Dennis obtained, see below. .    Current Level of Function Impacting Discharge (mobility/balance): MOD A for bed mobility, MIN-MOD A with sit-stand and SPT    Functional Outcome Measure: The patient scored Total: 1/56 on the [address] which is indicative of high fall risk. Other factors to consider for discharge:      Patient will benefit from skilled therapy intervention to address the above noted impairments. PLAN :  Recommendations and Planned Interventions: bed mobility training, transfer training, gait training, therapeutic exercises, neuromuscular re-education, and therapeutic activities      Frequency/Duration: Patient will be followed by physical therapy:  5 times a week to address goals. Recommendation for discharge: (in order for the patient to meet his/her long term goals)  To be determined: pending progress    This discharge recommendation:  A follow-up discussion with the attending provider and/or case management is planned    IF patient discharges home will need the following DME: to be determined (TBD)         SUBJECTIVE:   Patient stated i have arthritis.   patient requires constant promoting to open eyes throughout session    OBJECTIVE DATA SUMMARY:   HISTORY:    Past Medical History:   Diagnosis Date    Anemia     Arm vein blood clot, left     History of colon polyps     Lupus ([address]     Neutropenia (HCC)     Pulmonary hypertension ([address]      Past Surgical History:   Procedure Laterality Date    COLONOSCOPY N/A 4/21/2021    COLONOSCOPY performed by Keena Hess MD at [facility] ENDOSCOPY    HX ANKLE FRACTURE TX Left     HX COLONOSCOPY         Personal factors and/or comorbidities impacting plan of care: see above    Home Situation  Home Environment: Private residence  # Steps to Enter: 3  Rails to Enter: Yes  Hand Rails : Right  One/Two Story Residence: Two story, live on 1st floor  Living Alone: No  Support Systems:  (staying a friends home, visiting sister)  Patient Expects to be Discharged to[de-identified] House  Current DME Used/Available at Home: None    EXAMINATION/PRESENTATION/DECISION MAKING:   Vitals:    11/08/21 0916 11/08/21 0941 11/08/21 0955 11/08/21 1001   BP: (!) 145/80 (!) 164/98 (!) 153/94 (!) 168/99   BP 1 Location:  Right arm Right arm Right arm   BP Patient Position:  Supine Sitting Comment: sitting post activity   Pulse:  82 98 89   Temp:       Resp:       Height:       Weight:       SpO2:  96%  94%       Critical Behavior:  Neurologic State:  (patient appeared sleepy. When SLP turned on light, she c/o bright light. she continued to speak in a low volume voice this am during session. no dysarthria)  Orientation Level: Oriented to person, Oriented to place  Cognition: Follows commands  Safety/Judgement: Lack of insight into deficits  Hearing: Auditory  Auditory Impairment: None    Range Of Motion:  AROM: Generally decreased, functional           PROM: Generally decreased, functional           Strength:    Strength: Grossly decreased, non-functional        RLE Strength  R Hip Flexion: 2-  R Knee Flexion: 2-  R Knee Extension: 2-  R Ankle Dorsiflexion: 2-  R Ankle Plantar Flexion: 2-        LLE Strength  L Hip Flexion: 4-  L Knee Flexion: 4-  L Knee Extension: 4-  L Ankle Dorsiflexion: 4-  L Ankle Plantar Flexion: 4-  Tone & Sensation:   Tone: Normal              Sensation: Intact               Coordination:  Coordination: Grossly decreased, non-functional  Vision:      Functional Mobility:  Bed Mobility:  Rolling: Minimum assistance  Supine to Sit: Moderate assistance; Additional time     Scooting: Minimum assistance; Assist x1; Additional time  Transfers:  Sit to Stand:  Moderate assistance; Assist x1; Additional time  Stand to Sit: Minimum assistance; Assist x1; Additional time        Bed to Chair: Minimum assistance; Assist x1; Additional time              Balance:      Ambulation/Gait Training: Functional Measure  Plummer Balance Test:    Sitting to Standin  Standing Unsupported: 0  Sitting with Back Unsupported: 0  Standing to Sittin  Transfers: 1  Standing Unsupported with Eyes Closed: 0  Standing Unsupported with Feet Together: 0  Reach Forward with Outstretched Arm: 0   Object: 0  Turn to Look Over Shoulders: 0  Turn 360 Degrees: 0  Alternate Foot on Step/Stool: 0  Standing Unsupported One Foot in Front: 0  Stand on One Le  Total:          56=Maximum possible score;   0-20=High fall risk  21-40=Moderate fall risk   41-56=Low fall risk        Physical Therapy Evaluation Charge Determination   History Examination Presentation Decision-Making   HIGH Complexity :3+ comorbidities / personal factors will impact the outcome/ POC  MEDIUM Complexity : 3 Standardized tests and measures addressing body structure, function, activity limitation and / or participation in recreation  MEDIUM Complexity : Evolving with changing characteristics  Other outcome measures PLUMMER  HIGH       Based on the above components, the patient evaluation is determined to be of the following complexity level: HIGH     Pain Rating:  Reports in Right UE    Activity Tolerance:   Fair    After treatment patient left in no apparent distress:   Sitting in chair, Call bell within reach, and Bed / chair alarm activated    COMMUNICATION/EDUCATION:   The patients plan of care was discussed with: Occupational therapist and Registered nurse. Fall prevention education was provided and the patient/caregiver indicated understanding., Patient/family have participated as able in goal setting and plan of care. , and Patient/family agree to work toward stated goals and plan of care.     Thank you for this referral.  Emmanuel Castañeda, PT, DPT   Time Calculation: 30 mins

## 2021-11-08 NOTE — PROGRESS NOTES
[facility]  [facility], [address] [place] [address]  [phone]      Medical Progress Note      NAME: Lencho Wheat   :  1965  MRM:  [id number]    Date of service: 2021  6:57 AM       Assessment and Plan:   1. Acute CVA/ Right sided weakness -  Neuro consult and neurochecks. MRI brain: Acute small left posterior internal capsule/corona radiata lacunar infarct. Continue ASA. Fall precautions and PT/OT eval.  Increase atorvastatin to 80. Hold trazodone and lorazepam     2.  Lupus erythematosus - Continue plaquenil     3.  Pulmonary hypertension / Bilateral lower extremity edema - on lasix.      4.  Neutropenia, chronic. Suspect this is due to Lupus     5. GERD (gastroesophageal reflux disease) - PPI     6. Osteopenia - hold alendronate              Subjective:     Chief Complaint[de-identified] Patient was seen and examined as a follow up for RT side weakness. Chart was reviewed. c/o RT side weakness     ROS:  (bold if positive, if negative)    Tolerating PT  Tolerating Diet        Objective:     Last 24hrs VS reviewed since prior progress note.  Most recent are:    Visit Vitals  BP (!) 149/94   Pulse 80   Temp 98.3 °F (36.8 °C)   Resp 18   Ht 5' 7\" (1.702 m)   Wt 74.4 kg (164 lb)   SpO2 97%   BMI 25.69 kg/m²     SpO2 Readings from Last 6 Encounters:   21 97%   21 98%   19 98%        No intake or output data in the 24 hours ending 21 0657     Physical Exam:    Gen:  Well-developed, well-nourished, in no acute distress  HEENT:  Pink conjunctivae, PERRL, hearing intact to voice, moist mucous membranes  Neck:  Supple, without masses, thyroid non-tender  Resp:  No accessory muscle use, clear breath sounds without wheezes rales or rhonchi  Card:  No murmurs, normal S1, S2 without thrills, bruits or peripheral edema  Abd:  Soft, non-tender, non-distended, normoactive bowel sounds are present, no palpable organomegaly and no detectable hernias  Lymph:  No cervical or inguinal adenopathy  Musc:  No cyanosis or clubbing  Skin:  No rashes or ulcers, skin turgor is good  Neuro:  Cranial nerves are grossly intact, no focal motor weakness, follows commands appropriately  Psych:  Good insight, oriented to person, place and time, alert  __________________________________________________________________  Medications Reviewed: (see below)  Medications:     Current Facility-Administered Medications   Medication Dose Route Frequency    sodium chloride (NS) flush 5-40 mL  5-40 mL IntraVENous Q8H    sodium chloride (NS) flush 5-40 mL  5-40 mL IntraVENous PRN    acetaminophen (TYLENOL) tablet 650 mg  650 mg Oral Q6H PRN    Or    acetaminophen (TYLENOL) suppository 650 mg  650 mg Rectal Q6H PRN    polyethylene glycol (MIRALAX) packet 17 g  17 g Oral DAILY PRN    ondansetron (ZOFRAN ODT) tablet 4 mg  4 mg Oral Q8H PRN    Or    ondansetron (ZOFRAN) injection 4 mg  4 mg IntraVENous Q6H PRN    enoxaparin (LOVENOX) injection 40 mg  40 mg SubCUTAneous Q24H    atorvastatin (LIPITOR) tablet 80 mg  80 mg Oral DAILY    hydrOXYchloroQUINE (PLAQUENIL) tablet 200 mg  200 mg Oral BID    aspirin chewable tablet 81 mg  81 mg Oral DAILY    labetaloL (NORMODYNE;TRANDATE) 20 mg/4 mL (5 mg/mL) injection 20 mg  20 mg IntraVENous Q6H PRN    amLODIPine (NORVASC) tablet 10 mg  10 mg Oral DAILY    pantoprazole (PROTONIX) tablet 40 mg  40 mg Oral ACB        Lab Data Reviewed: (see below)  Lab Review:     Recent Labs     11/08/21 0348 11/07/21  1306   WBC 3.4* 3.3*   HGB 13.5 12.7   HCT 41.4 39.2    228     Recent Labs     11/08/21  0348 11/07/21  1306    140   K 2.8* 3.6    103   CO2 29 29   * 101*   BUN 10 14   CREA 0.69 0.84   CA 8.7 8.6   MG 1.6  --    ALB 3.0* 3.0*   TBILI 0.6 0.3   ALT 11* 20     No results found for: GLUCPOC  No results for input(s): PH, PCO2, PO2, HCO3, FIO2 in the last 72 hours. No results for input(s): INR, INREXT in the last 72 hours.   All Micro Results     Procedure Component Value Units Date/Time    URINE CULTURE HOLD SAMPLE [[id number]] Collected: 11/07/21 1430    Order Status: Completed Specimen: Urine from Serum Updated: 11/07/21 1435     Urine culture hold       Urine on hold in Microbiology dept for 2 days. If unpreserved urine is submitted, it cannot be used for addtional testing after 24 hours, recollection will be required. I have reviewed notes of prior 24hr. Other pertinent lab: Total time spent with patient: 28 I personally reviewed chart, notes, data and current medications in the medical record. I have personally examined and treated the patient at bedside during this period.                  Care Plan discussed with: Patient, Nursing Staff and >50% of time spent in counseling and coordination of care    Discussed:  Care Plan    Prophylaxis:  Lovenox    Disposition:  Home w/Family           ___________________________________________________    Attending Physician: Derek Richards MD

## 2021-11-08 NOTE — PROGRESS NOTES
11:30 AM- CM attempted to meet with pt to complete initial assessment and to provide obs letter- pt working with therapy, family at bedside watching. Floor CM updated.      Daisha Cobos, MSW, [address]

## 2021-11-08 NOTE — PROGRESS NOTES
Problem: Self Care Deficits Care Plan (Adult)  Goal: *Acute Goals and Plan of Care (Insert Text)  Description: FUNCTIONAL STATUS PRIOR TO ADMISSION: Patient was independent and active without use of DME.    HOME SUPPORT PRIOR TO ADMISSION: Patient normally lives in [place] with daughter. Does not require an assistive device, Independent with all ADL's, and was driving. Currently here in [place] visiting sister and friend. She is staying with friend    Occupational Therapy Goals  Initiated 11/8/2021  1. Patient will perform grooming with supervision/setup using R, dominant, UE as fine manipulator within 7 day(s). 2.  Patient will perform upper body dressing and bathing with minimum assistance assistance within 7 day(s). 3.  Patient will perform lower body dressing and bathing with moderate assistance assistance within 7 day(s). 4.  Patient will perform toilet transfers with moderate assistance to [facility] using best technique within 7 day(s). 5   Patient will participate in upper extremity therapeutic exercise/activities with minimal assistance with focus on R, dominant, UE strength, coordination, and attention within 7 day(s). 6.  Patient will use R, dominant, UE 75% of the time as fine manipulator with no cuing to increase independence with all ADL tasks within 7 days. 7.  Patient will engage in full Fugl-Zepeda Assessment of Upper Extremity outcome measure within 7 days.     Outcome: Progressing Towards Goal   OCCUPATIONAL THERAPY EVALUATION  Patient: Kristen Singh (9 y.o. female)  Date: 11/8/2021  Primary Diagnosis: Right sided weakness [R53.1]  CVA (cerebral vascular accident) [facility]) [I63.9]        Precautions:  Fall    ASSESSMENT  Based on the objective data described below, the patient presents with decreased activity tolerance, generalized weakness (R side > L side), hypersensitivity throughout R UE, impaired balance, poor arousal/lethargy, and poor cognition following admission on 11/7 for R side weakness. Patient has been undergoing neurological work-up since admission; CT was negative for acute process and MRI is pending. Patient's sister was at bedside and confirms background information and reports patient has been minimally verbal and lethargic all morning. Patient was received in the chair requesting to return to bed. She required near constant cuing to keep eyes open but would shut almost immediately; Poor arousal was a large barrier to progression. Patient unable to engage in Fugl-Zepeda Assessment of UE this date; Will continue to attempt at future tx. Patient performed transfer back to bed with mod A and cues for improved technique. Patient with poor participation in ADLs and required assist for all tasks today. Patient would benefit from continued skilled OT to progress towards goals and improve overall independence. Current Level of Function Impacting Discharge (ADLs/self-care): Patient required mod A x1 for functional mobility. Patient was     Functional Outcome Measure: The patient scored 15/100 for the Barthel Index. Patient will benefit from skilled therapy intervention to address the above noted impairments. PLAN :  Recommendations and Planned Interventions: self care training, functional mobility training, therapeutic exercise, balance training, therapeutic activities, cognitive retraining, endurance activities, neuromuscular re-education, patient education, home safety training, and family training/education    Frequency/Duration: Patient will be followed by occupational therapy 5 times a week to address goals. Recommendation for discharge: (in order for the patient to meet his/her long term goals)  To be determined: pending progress including arousal and engagement in OT tx.     This discharge recommendation:  Has been made in collaboration with the attending provider and/or case management    IF patient discharges home will need the following DME: none SUBJECTIVE:   Patient stated Okay.     OBJECTIVE DATA SUMMARY:   HISTORY:   Past Medical History:   Diagnosis Date    Anemia     Arm vein blood clot, left     History of colon polyps     Lupus (HCC)     Neutropenia (HCC)     Pulmonary hypertension (HCC)      Past Surgical History:   Procedure Laterality Date    COLONOSCOPY N/A 4/21/2021    COLONOSCOPY performed by Ann Coelho MD at [address] [place] [address] Court Left     HX COLONOSCOPY         Expanded or extensive additional review of patient history:     Home Situation  Home Environment: Private residence  # Steps to Enter: 3  Rails to Enter: Yes  Hand Rails : Right  One/Two Story Residence: Two story, live on 1st floor  Living Alone: No  Support Systems: Child(ray)  Patient Expects to be Discharged to[de-identified] House  Current DME Used/Available at Home: None    Hand dominance: Right    EXAMINATION OF PERFORMANCE DEFICITS:  Cognitive/Behavioral Status:  Neurologic State: Drowsy  Orientation Level: Oriented X4  Cognition: Follows commands  Perception: Cues to maintain midline in sitting; Cues to maintain midline in standing  Perseveration: No perseveration noted  Safety/Judgement: Decreased awareness of environment; Lack of insight into deficits    Skin: Intact in the uppers    Edema: None noted in the uppers    Hearing:   Auditory  Auditory Impairment: None    Vision/Perceptual:    Tracking: Unable to test secondary due to decreased visual attention    Saccades: Unable to test secondary to decreased visual attention    Convergence: Unable to test secondary due to decreased visual attention    Visual Fields: Unable to test secondary due to decreased visual attention      Range of Motion:  AROM: Generally decreased, functional  PROM: Generally decreased, functional      Strength:  Strength: Grossly decreased, non-functional     Coordination:  Fine Motor Skills-Upper: Left Impaired; Right Impaired    Gross Motor Skills-Upper: Left Impaired; Right Impaired    Tone & Sensation:  Tone: Normal  Sensation: Intact    Balance:  Sitting: Impaired  Sitting - Static: Fair (occasional)  Sitting - Dynamic: Poor (constant support)  Standing: Impaired  Standing - Static: Poor  Standing - Dynamic : Poor    Functional Mobility and Transfers for ADLs:  Bed Mobility:  Rolling: Minimum assistance  Supine to Sit:  (pt was received up )  Sit to Supine: Moderate assistance; Additional time; Assist x1  Scooting: Moderate assistance; Assist x1; Additional time    Transfers:  Sit to Stand: Moderate assistance; Assist x1; Additional time  Stand to Sit: Moderate assistance; Assist x1; Additional time  Bed to Chair: Moderate assistance; Assist x1; Additional time    ADL Assessment:  Feeding: Maximum assistance    Oral Facial Hygiene/Grooming: Maximum assistance    Bathing: Total assistance    Upper Body Dressing: Total assistance    Lower Body Dressing: Total assistance    Toileting: Total assistance    Cognitive Retraining  Safety/Judgement: Decreased awareness of environment; Lack of insight into deficits      Functional Measure:    Barthel Index:  Bathin  Bladder: 5  Bowels: 5  Groomin  Dressin  Feedin  Mobility: 0  Stairs: 0  Toilet Use: 0  Transfer (Bed to Chair and Back): 5  Total: 15/100      The Barthel ADL Index: Guidelines  1. The index should be used as a record of what a patient does, not as a record of what a patient could do. 2. The main aim is to establish degree of independence from any help, physical or verbal, however minor and for whatever reason. 3. The need for supervision renders the patient not independent. 4. A patient's performance should be established using the best available evidence. Asking the patient, friends/relatives and nurses are the usual sources, but direct observation and common sense are also important. However direct testing is not needed.   5. Usually the patient's performance over the preceding 24-48 hours is important, but occasionally longer periods will be relevant. 6. Middle categories imply that the patient supplies over 50 per cent of the effort. 7. Use of aids to be independent is allowed. Score Interpretation (from [address])    Independent   60-79 Minimally independent   40-59 Partially dependent   20-39 Very dependent   <20 Totally dependent     -Linsey Johnson, Barthel, D.W. (1965). Functional evaluation: the Barthel Index. [address] ([address] (1997). The Barthel activities of daily living index: self-reporting versus actual performance in the old (> or = 75 years). Journal of [address] [place] [address] 45(7), [address] [facility], MICHAEL, Pankaj Lobato., Maximino Bassett. (1999). Measuring the change in disability after inpatient rehabilitation; comparison of the responsiveness of the Barthel Index and Functional Worth Measure. Journal of Neurology, Neurosurgery, and Psychiatry, 66(4), 069-120. Shyla Thompson, N.J.A, MAI Griffin, & Jose Ribera M.A. (2004) Assessment of post-stroke quality of life in cost-effectiveness studies: The usefulness of the Barthel Index and the EuroQoL-5D.  Quality of Life Research, 15, 101-17        Occupational Therapy Evaluation Charge Determination   History Examination Decision-Making   LOW Complexity : Brief history review  LOW Complexity : 1-3 performance deficits relating to physical, cognitive , or psychosocial skils that result in activity limitations and / or participation restrictions  LOW Complexity : No comorbidities that affect functional and no verbal or physical assistance needed to complete eval tasks       Based on the above components, the patient evaluation is determined to be of the following complexity level: LOW     Activity Tolerance:   Fair to poor     After treatment patient left in no apparent distress:    Supine in bed, Call bell within reach, Bed / chair alarm activated, and Caregiver / family present    COMMUNICATION/EDUCATION:   The patients plan of care was discussed with: Physical therapist, Registered nurse, and patient . Patient was educated regarding her deficit(s) on admission as this relates to her proposed diagnosis of CVA. She demonstrated poor understanding d/t poor arousal.    Patient and/or family was verbally educated on the BE FAST acronym for signs/symptoms of CVA and TIA. Informed patient to refer to the Stroke Binder for further BE FAST information. All questions answered. Home safety education was provided and the patient/caregiver indicated understanding., Patient/family have participated as able in goal setting and plan of care. , and Patient/family agree to work toward stated goals and plan of care. This patients plan of care is appropriate for delegation to [facility].     Thank you for this referral.  Jaylene Conroy, OTR/L  Time Calculation: 36 mins

## 2021-11-08 NOTE — PROGRESS NOTES
Admission Medication Reconciliation: 11/08/21     Information obtained from:  Sister, PCP Dr Lashay Reyes office and Dr Kendra Arenas office in [place] [address] available:  YES     Prior to Admission Medications   Prescriptions Last Dose Informant Taking?   atorvastatin (LIPITOR) 10 mg tablet 11/6/2021 at Unknown time Self Yes   Sig: Take 10 mg by mouth daily. famotidine (PEPCID) 40 mg tablet 11/6/2021 at Unknown time  Yes   Sig: Take 40 mg by mouth daily. Indications: gastroesophageal reflux disease   furosemide (Lasix) 40 mg tablet 11/6/2021 at Unknown time Self Yes   Sig: Take 40 mg by mouth daily. linaCLOtide (Linzess) 72 mcg cap capsule   Yes   Sig: Take 72 mcg by mouth Daily (before breakfast). magnesium oxide (MAG-OX) 400 mg tablet 11/6/2021 at Unknown time  Yes   Sig: Take 400 mg by mouth nightly. omeprazole (PRILOSEC) 40 mg capsule   Yes   Sig: Take 40 mg by mouth daily. potassium chloride (Klor-Con M20) 20 mEq tablet 11/6/2021 at Unknown time Self Yes   Sig: Take 20 mEq by mouth daily. predniSONE (DELTASONE) 10 mg tablet   Yes   Sig: Take 10 mg by mouth daily (with breakfast). pregabalin (LYRICA) 150 mg capsule 11/6/2021 at Unknown time  Yes   Sig: Take 150 mg by mouth two (2) times a day. Indications: pain   traMADoL (ULTRAM) 50 mg tablet 11/6/2021 at Unknown time  Yes   Sig: Take 50 mg by mouth every six (6) hours as needed for Pain. traZODone (DESYREL) 150 mg tablet   Yes   Sig: Take 300 mg by mouth nightly.       Facility-Administered Medications: None        Thank you  Leigha De La Cruz, PharmD  [phone]

## 2021-11-08 NOTE — PROGRESS NOTES
11/07/21 1925 11/07/21 2351   Vitals   BP (!) 158/98 (!) 165/99     2130: patient called out complaining of headache. PRN tylenol given to patient. Was repositioning patient and patient stated she was feeling sick. Patient threw up fruit cup she had just eaten. No pills visible in vomit. 2142: PRN Zofran given. 2350: patient called out to go to the bathroom. Assisted patient on bedpan. Patient had a large void. Patient also complaining of headache. VS taken and /99.     2354: PRN labetalol given. Will recheck BP Q15 minutes to make sure BP is coming down appropriately.       11/08/21 0015 11/08/21 0030 11/08/21 0045   Vitals   BP (!) 159/95 (!) 143/85 (!) 144/84

## 2021-11-08 NOTE — CONSULTS
NEUROLOGY IN-PATIENT NEW CONSULTATION      11/8/2021    RE: Angelina Mccoy         1965      REFERRED BY:  Robbin Valero MD      CHIEF COMPLAINT:  This is Angelina Mccoy is a 55 y.o. female who had concerns including Numbness and Extremity Weakness. HPI:     Patient is visiting her sister from [place] with history of Lupus.      Patient comes in with sudden persistent weakness of the R UE and R LE.  (-) numbness  (-) headache    Was given Ativan from other ER    ROS   (-) fever  (-) rash  All other systems reviewed and are negative    Past Medical Hx  Past Medical History:   Diagnosis Date    Anemia     Arm vein blood clot, left     History of colon polyps     Lupus (HCC)     Neutropenia (HCC)     Pulmonary hypertension (HCC)        Social Hx  Social History     Socioeconomic History    Marital status: SINGLE   Tobacco Use    Smoking status: Never Smoker    Smokeless tobacco: Never Used   Substance and Sexual Activity    Alcohol use: Never    Drug use: Never       Family Hx  Family History   Problem Relation Age of Onset    Heart Disease Neg Hx        ALLERGIES  Allergies   Allergen Reactions    Lotrel [Amlodipine-Benazepril] Anaphylaxis     Tongue swelling    Sudafed [Pseudoephedrine Hcl] Anaphylaxis    Codeine Other (comments)     Pt does not recall    Doxycycline Hives       CURRENT MEDS  Current Facility-Administered Medications   Medication Dose Route Frequency Provider Last Rate Last Admin    sodium chloride (NS) flush 5-40 mL  5-40 mL IntraVENous Q8H Lizbeth Mo MD   10 mL at 11/07/21 1747    sodium chloride (NS) flush 5-40 mL  5-40 mL IntraVENous PRN Lizbeth Mo MD        acetaminophen (TYLENOL) tablet 650 mg  650 mg Oral Q6H PRN Lizbeth Mo MD   650 mg at 11/08/21 0349    Or    acetaminophen (TYLENOL) suppository 650 mg  650 mg Rectal Q6H PRN Lizbeth Mo MD        polyethylene glycol (MIRALAX) packet 17 g  17 g Oral DAILY PRN Lizbeth Mo MD  ondansetron (ZOFRAN ODT) tablet 4 mg  4 mg Oral Q8H PRN Christine Gibson MD        Or    ondansetron [facility]) injection 4 mg  4 mg IntraVENous Q6H PRN Christine Gibson MD   4 mg at 11/07/21 2142    enoxaparin (LOVENOX) injection 40 mg  40 mg SubCUTAneous Q24H Christine Gibson MD   40 mg at 11/07/21 1530    atorvastatin (LIPITOR) tablet 80 mg  80 mg Oral DAILY Christine Gibson MD   80 mg at 11/08/21 0846    hydrOXYchloroQUINE (PLAQUENIL) tablet 200 mg  200 mg Oral BID Christine Gibson MD   200 mg at 11/08/21 0846    aspirin chewable tablet 81 mg  81 mg Oral DAILY Christine Gibson MD   81 mg at 11/08/21 0846    labetaloL (NORMODYNE;TRANDATE) 20 mg/4 mL (5 mg/mL) injection 20 mg  20 mg IntraVENous Q6H PRN Christine Gibson MD   20 mg at 11/07/21 2354    amLODIPine (NORVASC) tablet 10 mg  10 mg Oral DAILY Christine Gibson MD   10 mg at 11/08/21 0846    pantoprazole (PROTONIX) tablet 40 mg  40 mg Oral ACB Christine Gibson MD   40 mg at 11/08/21 0540           PREVIOUS WORKUP: (reviewed)  IMAGING:    CT Results (recent):  Results from Hospital Encounter encounter on 11/07/21    CTA HEAD NECK W CONT    Narrative  *PRELIMINARY REPORT*    No large vessel occlusion in the head or neck. Preliminary report was provided by Dr. Aurora Ramos, the on-call radiologist, on  11/7/2021 at 1335 hours. Final report to follow. *END PRELIMINARY REPORT*    CLINICAL HISTORY: Right leg weakness    EXAMINATION:  CT ANGIOGRAPHY HEAD AND NECK    COMPARISON: None    TECHNIQUE:  Following the uneventful administration of iodinated contrast  material, axial CT angiography of the head and neck was performed. Delayed axial  images through the head were also obtained. Coronal and sagittal reconstructions  were obtained. Manual postprocessing of images was performed. 3-D  Sagittal  maximal intensity projection images were obtained. 3-D Coronal maximal  intensity projections were obtained.  CT dose reduction was achieved through use  of a standardized protocol tailored for this examination and automatic exposure  control for dose modulation. This study was analyzed by the [address] ai algorithm. FINDINGS:    Delayed contrast-enhanced head CT:    The ventricles are midline without hydrocephalus. There is no acute intra or  extra-axial hemorrhage. The basal cisterns are clear. The paranasal sinuses are  clear. CTA NECK:    Great vessels: Normal arch anatomy with the origins patent. Right subclavian artery: Patent    Left subclavian artery: Patent    Right common carotid artery: Patent    Left common carotid artery: Patent    Cervical right internal carotid artery: Patent with no significant stenosis by  NASCET criteria. Cervical left internal carotid artery: Patent with no significant stenosis by  NASCET criteria. Right vertebral artery: Patent    Left vertebral artery: Patent    Imaged lung apices are clear. Bilateral axillary and prepectoral  lymphadenopathy. No cervical lymphadenopathy. Numerous calcifications throughout  both parotid glands. 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 Anterior cerebral arteries: Hypoplastic left A1 segment. Patent right A1 segment  and bilateral A2 segments. Anterior communicating artery: Patent    Right middle cerebral artery: Patent    Left middle cerebral artery: Patent    Posterior communicating arteries: Patent    Posterior cerebral arteries: Patent    Basilar artery: Patent    Distal vertebral arteries: Patent    Impression  1. No intracranial large vessel occlusion or hemodynamically significant  stenosis. 2.  Bilateral axillary and prepectoral lymphadenopathy. 3.  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 MRI Results (recent):  No results found for this or any previous visit.       IR Results (recent):  No results found for this or any previous visit. VAS/US Results (recent):  No results found for this or any previous visit. LABS (reviewed)  Results for orders placed or performed during the hospital encounter of 11/07/21   URINE CULTURE HOLD SAMPLE    Specimen: Serum; Urine   Result Value Ref Range    Urine culture hold        Urine on hold in Microbiology dept for 2 days. If unpreserved urine is submitted, it cannot be used for addtional testing after 24 hours, recollection will be required. CBC WITH AUTOMATED DIFF   Result Value Ref Range    WBC 3.3 (L) 3.6 - 11.0 K/uL    RBC 4.19 3.80 - 5.20 M/uL    HGB 12.7 11.5 - 16.0 g/dL    HCT 39.2 35.0 - 47.0 %    MCV 93.6 80.0 - 99.0 FL    MCH 30.3 26.0 - 34.0 PG    MCHC 32.4 30.0 - 36.5 g/dL    RDW 12.6 11.5 - 14.5 %    PLATELET 088 779 - 680 K/uL    MPV 10.8 8.9 - 12.9 FL    NRBC 0.0 0.0  WBC    ABSOLUTE NRBC 0.00 0.00 - 0.01 K/uL    NEUTROPHILS 46 32 - 75 %    LYMPHOCYTES 43 12 - 49 %    MONOCYTES 10 5 - 13 %    EOSINOPHILS 0 0 - 7 %    BASOPHILS 0 0 - 1 %    IMMATURE GRANULOCYTES 0 0 - 0.5 %    ABS. NEUTROPHILS 1.5 (L) 1.8 - 8.0 K/UL    ABS. LYMPHOCYTES 1.4 0.8 - 3.5 K/UL    ABS. MONOCYTES 0.3 0.0 - 1.0 K/UL    ABS. EOSINOPHILS 0.0 0.0 - 0.4 K/UL    ABS. BASOPHILS 0.0 0.0 - 0.1 K/UL    ABS. IMM. GRANS. 0.0 0.00 - 0.04 K/UL    DF AUTOMATED     METABOLIC PANEL, COMPREHENSIVE   Result Value Ref Range    Sodium 140 136 - 145 mmol/L    Potassium 3.6 3.5 - 5.1 mmol/L    Chloride 103 97 - 108 mmol/L    CO2 29 21 - 32 mmol/L    Anion gap 8 5 - 15 mmol/L    Glucose 101 (H) 65 - 100 mg/dL    BUN 14 6 - 20 MG/DL    Creatinine 0.84 0.55 - 1.02 MG/DL    BUN/Creatinine ratio 17 12 - 20      GFR est AA >60 >60 ml/min/1.73m2    GFR est non-AA >60 >60 ml/min/1.73m2    Calcium 8.6 8.5 - 10.1 MG/DL    Bilirubin, total 0.3 0.2 - 1.0 MG/DL    ALT (SGPT) 20 12 - 78 U/L    AST (SGOT) 21 15 - 37 U/L    Alk.  phosphatase 66 45 - 117 U/L    Protein, total 9.1 (H) 6.4 - 8.2 g/dL    Albumin 3.0 (L) 3.5 - 5.0 g/dL    Globulin 6.1 (H) 2.0 - 4.0 g/dL    A-G Ratio 0.5 (L) 1.1 - 2.2     TROPONIN-HIGH SENSITIVITY   Result Value Ref Range    Troponin-High Sensitivity 6 0 - 51 ng/L   URINALYSIS W/MICROSCOPIC   Result Value Ref Range    Color YELLOW/STRAW      Appearance CLEAR CLEAR      Specific gravity 1.010 1.003 - 1.030      pH (UA) 7.0 5.0 - 8.0      Protein Negative NEG mg/dL    Glucose Negative NEG mg/dL    Ketone Negative NEG mg/dL    Bilirubin Negative NEG      Blood Negative NEG      Urobilinogen 0.2 0.2 - 1.0 EU/dL    Nitrites Negative NEG      Leukocyte Esterase Negative NEG      WBC 0-4 0 - 4 /hpf    RBC 0-5 0 - 5 /hpf    Epithelial cells FEW FEW /lpf    Bacteria Negative NEG /hpf   LIPID PANEL   Result Value Ref Range    Cholesterol, total 172 <200 MG/DL    Triglyceride 135 <150 MG/DL    HDL Cholesterol 61 MG/DL    LDL, calculated 84 0 - 100 MG/DL    VLDL, calculated 27 MG/DL    CHOL/HDL Ratio 2.8 0.0 - 5.0     HEMOGLOBIN A1C WITH EAG   Result Value Ref Range    Hemoglobin A1c 5.6 4.0 - 5.6 %    Est. average glucose 392 mg/dL   METABOLIC PANEL, COMPREHENSIVE   Result Value Ref Range    Sodium 136 136 - 145 mmol/L    Potassium 2.8 (L) 3.5 - 5.1 mmol/L    Chloride 101 97 - 108 mmol/L    CO2 29 21 - 32 mmol/L    Anion gap 6 5 - 15 mmol/L    Glucose 111 (H) 65 - 100 mg/dL    BUN 10 6 - 20 MG/DL    Creatinine 0.69 0.55 - 1.02 MG/DL    BUN/Creatinine ratio 14 12 - 20      GFR est AA >60 >60 ml/min/1.73m2    GFR est non-AA >60 >60 ml/min/1.73m2    Calcium 8.7 8.5 - 10.1 MG/DL    Bilirubin, total 0.6 0.2 - 1.0 MG/DL    ALT (SGPT) 11 (L) 12 - 78 U/L    AST (SGOT) 24 15 - 37 U/L    Alk.  phosphatase 73 45 - 117 U/L    Protein, total 9.0 (H) 6.4 - 8.2 g/dL    Albumin 3.0 (L) 3.5 - 5.0 g/dL    Globulin 6.0 (H) 2.0 - 4.0 g/dL    A-G Ratio 0.5 (L) 1.1 - 2.2     MAGNESIUM   Result Value Ref Range    Magnesium 1.6 1.6 - 2.4 mg/dL   CBC W/O DIFF   Result Value Ref Range    WBC 3.4 (L) 3.6 - 11.0 K/uL    RBC 4.49 3.80 - 5.20 M/uL    HGB 13.5 11.5 - 16.0 g/dL    HCT 41.4 35.0 - 47.0 %    MCV 92.2 80.0 - 99.0 FL    MCH 30.1 26.0 - 34.0 PG    MCHC 32.6 30.0 - 36.5 g/dL    RDW 12.4 11.5 - 14.5 %    PLATELET 000 303 - 395 K/uL    MPV 10.3 8.9 - 12.9 FL    NRBC 0.0 0  WBC    ABSOLUTE NRBC 0.00 0.00 - 0.01 K/uL   EKG, 12 LEAD, INITIAL   Result Value Ref Range    Ventricular Rate 58 BPM    Atrial Rate 58 BPM    P-R Interval 190 ms    QRS Duration 98 ms    Q-T Interval 444 ms    QTC Calculation (Bezet) 435 ms    Calculated P Axis 40 degrees    Calculated R Axis -34 degrees    Calculated T Axis 44 degrees    Diagnosis       Sinus bradycardia  Possible Left atrial enlargement  Left axis deviation  Left ventricular hypertrophy ( R in aVL , Keshav product )  Abnormal ECG  When compared with ECG of 18-APR-2021 16:21,  Vent. rate has decreased BY  31 BPM  QT has shortened         Physical Exam:     Visit Vitals  BP (!) (P) 164/98   Pulse (P) 82   Temp 98.6 °F (37 °C)   Resp 21   Ht 5' 7\" (1.702 m)   Wt 74.4 kg (164 lb)   SpO2 (P) 96%   BMI 25.69 kg/m²     General:  Drowsy   Head:  Normocephalic, without obvious abnormality, atraumatic. Eyes:  Conjunctivae/corneas clear. Lungs:  Heart:   Non labored breathing  Regular rate and rhythm, no carotid bruits   Abdomen:   Soft, non-distended   Extremities: Extremities normal, atraumatic, no cyanosis or edema. Pulses: 2+ and symmetric all extremities. Skin: Skin color, texture, turgor normal. No rashes or lesions.   Neurologic Exam     Gen: Drowsy but answers to questions and follows commands  Able to name and repeat  Knows her sister, hospital, and president  Cranial Nerves:  I: smell Not tested   II: visual fields Full to confrontation   II: pupils Equal, round, reactive to light   II: optic disc No papilledema   III,VII: ptosis none   III,IV,VI: extraocular muscles  Full ROM   V: mastication normal   V: facial light touch sensation  normal   VII: facial muscle function   symmetric VIII: hearing symmetric   IX: soft palate elevation  normal   XI: trapezius strength  5/5   XI: sternocleidomastoid strength 5/5   XI: neck flexion strength  5/5   XII: tongue  midline     Motor: R UE 4/5  R LE 4/5  But question about effort  Sensory: intact to LT, PP, vibration, and temperature  Reflexes: 2+ throughout; Down going toes  Coordination: Good FTN and HTS  Gait: deferred           Impression:     Tawanna Macias is a 55 y.o. female who  has a past medical history of Anemia, Arm vein blood clot, left, History of colon polyps, Lupus ([facility] [address] Neutropenia ([facility] [address] and Pulmonary hypertension ([facility] [address] She also has no past medical history of Difficult intubation, Malignant hyperthermia due to anesthesia, Nausea & vomiting, or Pseudocholinesterase deficiency. who is visiting her sister from [place] and has history of Lupus comes in with sudden persistent weakness of the R UE and R LE. Patient should be evaluated for stroke    CTA head and neck: No intracranial large vessel occlusion or hemodynamically significant  stenosis. Bilateral axillary and prepectoral lymphadenopathy. 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 RECOMMENDATIONS  1. I had a long discussion with patient and family. Discussed diagnosis, prognosis, pathophysiology and available treatment. Reviewed test results. All questions were answered. 2. MRI Brain to look for stroke. If this is negative, will order MRI cervical spine to look for transverse myelitis  3. ASA 81 every day for now  4. LDL 84. Lipitor increased to 80 mg every day   4.  Follow up with NeuroIR Dr Tahmina Adam as out patient for aneurysm    Please call for questions        Thank you for the consultation      Te Lucia MD  Diplomate, American Board of Psychiatry and Neurology  Diplomate, Neuromuscular Medicine  Diplomate, American Board of Electrodiagnostic Medicine    Greater than 50% of time spent counseling patient        CC: Other, MD Robbin  Fax: None

## 2021-11-08 NOTE — PROGRESS NOTES
Problem: Dysphagia (Adult)  Goal: *Acute Goals and Plan of Care (Insert Text)  Description: Swallowing goals initiated 11-8-21:  1) tolerate regular diet, thin liquids without s/s aspiration by 11-11-21  2) speech intelligibility 90% at conversational level [id number]  Outcome: Not Progressing Towards Goal   SPEECH LANGUAGE PATHOLOGY BEDSIDE SWALLOW EVALUATION  Patient: Radha Rodríguez (73 y.o. female)  Date: 11/8/2021  Primary Diagnosis: Right sided weakness [R53.1]        Precautions: aspiration       ASSESSMENT :  Based on the objective data described below, the patient presents with functional swallow. However, evaluation limited by her refusal to eat. She did vomit her fruit last night. Currently photophobic and c/o head ache. BP moderately high and being treated. Speech intelligibility poor due to low volume, breathy voice. No dysarthria. Admitted 11-7-21 with R hand and leg weakness. Head CT and CTA: negative. PMH: lupus, pulm HTN, anemia, L arm vein blood clot, neutropenia. Visiting from [facility]    Patient will benefit from skilled intervention to address the above impairments. Patients rehabilitation potential is considered to be Good     PLAN :  Recommendations and Planned Interventions:  Ok to continue diet as tolerated. Frequency/Duration: Patient will be followed by speech-language pathology 2 times a week to address goals. Discharge Recommendations: To Be Determined     SUBJECTIVE:   Patient stated The light hurts my eyes. .  Patient initially appeared lethargic and would barely speak to answer all ?'s. SLP and RN turned on bright light and pulled her up in bed. She was able to wake up and talk in full sentences with a low volume voice.    C/o headache    OBJECTIVE:     Past Medical History:   Diagnosis Date    Anemia     Arm vein blood clot, left     History of colon polyps     Lupus (HCC)     Neutropenia (HCC)     Pulmonary hypertension (HCC)      Past Surgical History:   Procedure Laterality Date    COLONOSCOPY N/A 4/21/2021    COLONOSCOPY performed by Latricia Powell MD at [address] [place] [address] Court Left     HX COLONOSCOPY       Prior Level of Function/Home Situation: visiting  Home Situation  Home Environment: Private residence  One/Two Story Residence: One story  Living Alone: No  Support Systems: Child(ray), Other Family Member(s)  Patient Expects to be Discharged to[de-identified] House  Current DME Used/Available at Home: None  Diet prior to admission: regular, thins  Current Diet:  regular, thins. She passed a STANd   Cognitive and Communication Status:  Neurologic State:  (patient appeared sleepy. When SLP turned on light, she c/o bright light. she continued to speak in a low volume voice this am during session. no dysarthria)  Orientation Level: Oriented to person, Oriented to place  Cognition: Follows commands        Safety/Judgement: Lack of insight into deficits  Oral Assessment:  Oral Assessment  Labial: No impairment  Dentition:  (has full plate on top and partials on bottom. states that she only needs top to eat)  Oral Hygiene: WFL  Lingual: No impairment  Velum: No impairment  Mandible: No impairment  P.O. Trials:  Patient Position: upright in bed  Vocal quality prior to P.O.: No impairment  Consistency Presented: Thin liquid; Pill/Tablet  How Presented: SLP-fed/presented; Spoon; Straw; Successive swallows   ORAL PHASE:   Bolus Acceptance: No impairment  Bolus Formation/Control: No impairment     Propulsion: No impairment  Oral Residue: None  PHARYNGEAL PHASE:   Initiation of Swallow: No impairment  Laryngeal Elevation: Functional  Aspiration Signs/Symptoms:  (patient took 10 pills at once with a large sip of water and tilted her head back. coughed x1, no coughing on other sip)     SPEECH:   DDK:   Wnl    VOICE MILDLY breathy, hoarse and extremely low volume.    Speech intelligibility 50%                     NOMS:   The NOMS functional outcome measure was used to quantify this patient's level of swallowing impairment. Based on the NOMS, the patient was determined to be at level 6 for swallow function       NOMS Swallowing Levels:  Level 1 (CN): NPO  Level 2 (CM): NPO but takes consistency in therapy  Level 3 (CL): Takes less than 50% of nutrition p.o. and continues with nonoral feedings; and/or safe with mod cues; and/or max diet restriction  Level 4 (CK): Safe swallow but needs mod cues; and/or mod diet restriction; and/or still requires some nonoral feeding/supplements  Level 5 (CJ): Safe swallow with min diet restriction; and/or needs min cues  Level 6 (CI): Independent with p.o.; rare cues; usually self cues; may need to avoid some foods or needs extra time  Level 7 ([address] [place] [address]): Independent for all p.o.  BEN. (2003). National Outcomes Measurement System (NOMS): Adult Speech-Language Pathology User's Guide. Pain:             After treatment:   Patient left in no apparent distress in bed and Nursing notified    COMMUNICATION/EDUCATION:   Patient was educated regarding her deficit(s) of dysphagia  as this relates to her diagnosis of R side weakness. She demonstrated Fair understanding as evidenced by discussion. .    The patient's plan of care including recommendations, planned interventions, and recommended diet changes were discussed with: Physical therapist and Registered nurse. Patient understands intent and goals of therapy, but is neutral about his/her participation.     Thank you for this referral.  LATESHA Ibarra  Time Calculation: 15 mins

## 2021-11-08 NOTE — PROGRESS NOTES
Radiology  MRI Head  IMPRESSION:   Acute small left posterior internal capsule/corona radiata lacunar infarct.

## 2021-11-09 ENCOUNTER — APPOINTMENT (OUTPATIENT)
Dept: NON INVASIVE DIAGNOSTICS | Age: 56
DRG: 065 | End: 2021-11-09
Attending: INTERNAL MEDICINE
Payer: MEDICARE

## 2021-11-09 LAB
ECHO AO ASC DIAM: 3.21 CM
ECHO AV ANNULUS DIAM: 3.19 CM
ECHO AV AREA PEAK VELOCITY: 2.81 CM2
ECHO AV AREA/BSA PEAK VELOCITY: 1.5 CM2/M2
ECHO AV PEAK GRADIENT: 8.4 MMHG
ECHO AV PEAK VELOCITY: 144.94 CM/S
ECHO IVC PROX: 0.68 CM
ECHO LA AREA 4C: 7.41 CM2
ECHO LA MAJOR AXIS: 2.46 CM
ECHO LA MINOR AXIS: 1.32 CM
ECHO LA VOL 2C: 18.29 ML (ref 22–52)
ECHO LA VOL 4C: 11.66 ML (ref 22–52)
ECHO LA VOL BP: 16.29 ML (ref 22–52)
ECHO LA VOL/BSA BIPLANE: 8.76 ML/M2 (ref 16–28)
ECHO LA VOLUME INDEX A2C: 9.83 ML/M2 (ref 16–28)
ECHO LA VOLUME INDEX A4C: 6.27 ML/M2 (ref 16–28)
ECHO LV E' LATERAL VELOCITY: 8.3 CM/S
ECHO LV E' SEPTAL VELOCITY: 5.16 CM/S
ECHO LV INTERNAL DIMENSION DIASTOLIC: 3.32 CM (ref 3.9–5.3)
ECHO LV INTERNAL DIMENSION SYSTOLIC: 2.07 CM
ECHO LV IVSD: 1 CM (ref 0.6–0.9)
ECHO LV MASS 2D: 88.5 G (ref 67–162)
ECHO LV MASS INDEX 2D: 47.6 G/M2 (ref 43–95)
ECHO LV POSTERIOR WALL DIASTOLIC: 0.9 CM (ref 0.6–0.9)
ECHO LVOT DIAM: 2.15 CM
ECHO LVOT PEAK GRADIENT: 5.02 MMHG
ECHO LVOT PEAK VELOCITY: 112.04 CM/S
ECHO MV A VELOCITY: 63.17 CM/S
ECHO MV E DECELERATION TIME (DT): 133.17 MS
ECHO MV E VELOCITY: 43.72 CM/S
ECHO MV E/A RATIO: 0.69
ECHO MV E/E' LATERAL: 5.27
ECHO MV E/E' RATIO (AVERAGED): 6.87
ECHO MV E/E' SEPTAL: 8.47
ECHO PV MAX VELOCITY: 93.26 CM/S
ECHO PV PEAK INSTANTANEOUS GRADIENT SYSTOLIC: 3.48 MMHG
ECHO PV REGURGITANT MAX VELOCITY: 131.68 CM/S
ECHO RV INTERNAL DIMENSION: 2.44 CM
ECHO RV TAPSE: 1.47 CM (ref 1.5–2)
ECHO TV REGURGITANT MAX VELOCITY: 212.65 CM/S
ECHO TV REGURGITANT PEAK GRADIENT: 18.09 MMHG
LA VOL DISK BP: 15.02 ML (ref 22–52)

## 2021-11-09 PROCEDURE — 92507 TX SP LANG VOICE COMM INDIV: CPT

## 2021-11-09 PROCEDURE — 74011250637 HC RX REV CODE- 250/637: Performed by: INTERNAL MEDICINE

## 2021-11-09 PROCEDURE — 65660000000 HC RM CCU STEPDOWN

## 2021-11-09 PROCEDURE — 74011636637 HC RX REV CODE- 636/637: Performed by: INTERNAL MEDICINE

## 2021-11-09 PROCEDURE — 74011250636 HC RX REV CODE- 250/636: Performed by: INTERNAL MEDICINE

## 2021-11-09 PROCEDURE — 93306 TTE W/DOPPLER COMPLETE: CPT

## 2021-11-09 PROCEDURE — 99233 SBSQ HOSP IP/OBS HIGH 50: CPT | Performed by: PSYCHIATRY & NEUROLOGY

## 2021-11-09 PROCEDURE — 93306 TTE W/DOPPLER COMPLETE: CPT | Performed by: INTERNAL MEDICINE

## 2021-11-09 PROCEDURE — 97530 THERAPEUTIC ACTIVITIES: CPT

## 2021-11-09 PROCEDURE — 97112 NEUROMUSCULAR REEDUCATION: CPT

## 2021-11-09 RX ADMIN — HYDROXYCHLOROQUINE SULFATE 200 MG: 200 TABLET ORAL at 17:59

## 2021-11-09 RX ADMIN — Medication 10 ML: at 13:21

## 2021-11-09 RX ADMIN — ENOXAPARIN SODIUM 40 MG: 100 INJECTION SUBCUTANEOUS at 13:21

## 2021-11-09 RX ADMIN — AMLODIPINE BESYLATE 10 MG: 5 TABLET ORAL at 08:26

## 2021-11-09 RX ADMIN — ASPIRIN 81 MG: 81 TABLET, CHEWABLE ORAL at 08:27

## 2021-11-09 RX ADMIN — HYDROXYCHLOROQUINE SULFATE 200 MG: 200 TABLET ORAL at 08:26

## 2021-11-09 RX ADMIN — PREGABALIN 150 MG: 100 CAPSULE ORAL at 08:26

## 2021-11-09 RX ADMIN — Medication 10 ML: at 21:05

## 2021-11-09 RX ADMIN — PREGABALIN 150 MG: 100 CAPSULE ORAL at 18:00

## 2021-11-09 RX ADMIN — PREDNISONE 10 MG: 5 TABLET ORAL at 08:26

## 2021-11-09 RX ADMIN — TRAZODONE HYDROCHLORIDE 300 MG: 100 TABLET ORAL at 21:05

## 2021-11-09 RX ADMIN — Medication 10 ML: at 06:32

## 2021-11-09 RX ADMIN — PANTOPRAZOLE SODIUM 40 MG: 40 TABLET, DELAYED RELEASE ORAL at 06:32

## 2021-11-09 RX ADMIN — ATORVASTATIN CALCIUM 80 MG: 20 TABLET, FILM COATED ORAL at 08:26

## 2021-11-09 NOTE — PROGRESS NOTES
Problem: Pressure Injury - Risk of  Goal: *Prevention of pressure injury  Description: Document Jonah Scale and appropriate interventions in the flowsheet.   Outcome: Progressing Towards Goal  Note: Pressure Injury Interventions:  Sensory Interventions: Float heels, Minimize linen layers         Activity Interventions: PT/OT evaluation    Mobility Interventions: PT/OT evaluation    Nutrition Interventions: Document food/fluid/supplement intake                     Problem: TIA/CVA Stroke: 0-24 hours  Goal: Off Pathway (Use only if patient is Off Pathway)  Outcome: Progressing Towards Goal  Goal: Medications  Outcome: Progressing Towards Goal  Goal: Respiratory  Outcome: Progressing Towards Goal

## 2021-11-09 NOTE — ROUTINE PROCESS
1900 - Bedside shift change report given to [address], RN (oncoming nurse) by [address], RN (offgoing nurse). Report included the following information SBAR, Kardex, Intake/Output, MAR, Accordion and Recent Results. 2315 - PCT took patients temperature and found it to be 100.5. RN administered tylenol for temp.    0700 - Bedside shift change report given to Genevieve Thomason RN (oncoming nurse) by Jones Chacko RN (offgoing nurse). Report included the following information SBAR, Kardex, Intake/Output, MAR and Accordion. Stroke Education provided to patient and the following topics were discussed    1. Patients personal risk factors for stroke are hypertension    2. Warning signs of Stroke:        * Sudden numbness or weakness of the face, arm or leg, especially on one side of          The body            * Sudden confusion, trouble speaking or understanding        * Sudden trouble seeing in one or both eyes        * Sudden trouble walking, dizziness, loss of balance or coordination        * Sudden severe headache with no known cause      3. Importance of activation Emergency Medical Services ( 9-1-1 ) immediately if experience any warning signs of stroke. 4. Be sure and schedule a follow-up appointment with your primary care doctor or any specialists as instructed. 5. You must take medicine every day to treat your risk factors for stroke. Be sure to take your medicines exactly as your doctor tells you: no more, no less. Know what your medicines are for , what they do. Anti-thrombotics /anticoagulants can help prevent strokes. You are taking the following medicine(s)  ASA, Lovenox     6. Smoking and second-hand smoke greatly increase your risk of stroke, cardiovascular disease and death. Smoking history never    7. Information provided was [facility] Stroke Education Binder, Stroke Handouts or Verbal Education    8. Documentation of teaching completed in Patient Education Activity and on Care Plan with teaching response noted? yes

## 2021-11-09 NOTE — PROGRESS NOTES
Occupational Therapy Note:  Chart reviewed and spoke with nursing. Attempted OT tx however patient had just returned to bed. She was able to arouse to voice and maintain arousal for conversation. Patient c/o increased fatigue, states she had been up earlier this AM, and now declines further activity despite max encouragement. Will continue to follow.     Mindy oFrrester, OTR/L

## 2021-11-09 NOTE — ROUTINE PROCESS
SPEECH LANGUAGE PATHOLOGY TREATMENT  Patient: Tawanna Macias (21 y.o. female)  Date: 11/9/2021  Diagnosis: Right sided weakness [R53.1]  CVA (cerebral vascular accident) [facility]) [I63.9] Right sided weakness        ASSESSMENT:  Patient able to communicate her wants and needs. She does continue to use a low volume voice, but no dysarthria noted. She is eating a little without vomiting and taking pills without s/s aspiration per RN. MRI: tiny infarct. PLAN:  SLP will not follow in acute care. She may benefit from full integrated language evaluation on rehab unit, which is the discharge recommendation from PT. Discharge Recommendations: To Be Determined by PT/OT     SUBJECTIVE:   Patient stated a boiled egg and applesauce\"  -what she usually eats for breakfast  OBJECTIVE:   Mental Status:  Neurologic State: Drowsy  Orientation Level: Oriented X4  Cognition: Follows commands  Perception: Cues to maintain midline in sitting, Cues to maintain midline in standing  Perseveration: No perseveration noted  Safety/Judgement: Decreased awareness of environment, Lack of insight into deficits  Treatment & Interventions: Motor Speech:         low volume voice, affecting speech intelligibility. Listeners constantly have to ask her to repeat herself. She could bring up the volume some. No dysarthria. Language Comprehension and Expression:     Verbal Expression  Verbal Expression  Primary Mode of Expression: Verbal   Able to communicate her wants and needs.    Able to ask ?'s of neurologist in room this am.   Neuro-Linguistics:                                                  Voice:        Response & Tolerance to Activities:       Pain:  Pain Scale 1: Numeric (0 - 10)  Pain Intensity 1: 0       After treatment:   Patient left in no apparent distress in bed and Nursing notified    COMMUNICATION/EDUCATION:   Patient was educated regarding her deficit(s) of low volume voice as this relates to her diagnosis of CVA. She demonstrated Good understanding as evidenced by discussion. .    The patient's plan of care including recommendations, planned interventions, and recommended diet changes were discussed with: Registered nurse.      LATESHA Lux  Time Calculation: 10 mins

## 2021-11-09 NOTE — PROGRESS NOTES
[facility] [address]  [address] [facility], [address] [place] [address]  [phone]      Medical Progress Note      NAME: Judy Strange   :  1965  MRM:  [id number]    Date of service: 2021  6:57 AM       Assessment and Plan:   1. Acute CVA/ Right sided weakness -  Neuro consult and neurochecks. MRI brain: Acute small left posterior internal capsule/corona radiata lacunar infarct. Continue ASA. Fall precautions and PT/OT eval.  Increase atorvastatin to 80. echo is unremarkable. Hold trazodone and lorazepam. May need inpatient rehab      2. Lupus erythematosus - Continue plaquenil     3.  Pulmonary hypertension / Bilateral lower extremity edema - on lasix.      4.  Neutropenia, chronic. Suspect this is due to Lupus     5. GERD (gastroesophageal reflux disease) - PPI     6. Osteopenia - hold alendronate              Subjective:     Chief Complaint[de-identified] Patient was seen and examined as a follow up for RT side weakness. Chart was reviewed. Rt side weakness     ROS:  (bold if positive, if negative)    Tolerating PT  Tolerating Diet        Objective:     Last 24hrs VS reviewed since prior progress note.  Most recent are:    Visit Vitals  BP 93/74   Pulse 97   Temp 97.9 °F (36.6 °C)   Resp 15   Ht 5' 7\" (1.702 m)   Wt 74.4 kg (164 lb 0.4 oz)   SpO2 96%   BMI 25.69 kg/m²     SpO2 Readings from Last 6 Encounters:   21 96%   21 98%   19 98%            Intake/Output Summary (Last 24 hours) at 2021 1235  Last data filed at 2021 0417  Gross per 24 hour   Intake 100 ml   Output --   Net 100 ml        Physical Exam:    Gen:  Well-developed, well-nourished, in no acute distress  HEENT:  Pink conjunctivae, PERRL, hearing intact to voice, moist mucous membranes  Neck:  Supple, without masses, thyroid non-tender  Resp:  No accessory muscle use, clear breath sounds without wheezes rales or rhonchi  Card:  No murmurs, normal S1, S2 without thrills, bruits or peripheral edema  Abd:  Soft, non-tender, non-distended, normoactive bowel sounds are present, no palpable organomegaly and no detectable hernias  Lymph:  No cervical or inguinal adenopathy  Musc:  No cyanosis or clubbing  Skin:  No rashes or ulcers, skin turgor is good  Neuro:  Cranial nerves are grossly intact, Rt side weakness   Psych:  Good insight, oriented to person, place and time, alert  __________________________________________________________________  Medications Reviewed: (see below)  Medications:     Current Facility-Administered Medications   Medication Dose Route Frequency    pantoprazole (PROTONIX) tablet 40 mg  40 mg Oral ACB    predniSONE (DELTASONE) tablet 10 mg  10 mg Oral DAILY WITH BREAKFAST    pregabalin (LYRICA) capsule 150 mg  150 mg Oral BID    traMADoL (ULTRAM) tablet 50 mg  50 mg Oral Q6H PRN    traZODone (DESYREL) tablet 300 mg  300 mg Oral QHS    sodium chloride (NS) flush 5-40 mL  5-40 mL IntraVENous Q8H    sodium chloride (NS) flush 5-40 mL  5-40 mL IntraVENous PRN    acetaminophen (TYLENOL) tablet 650 mg  650 mg Oral Q6H PRN    Or    acetaminophen (TYLENOL) suppository 650 mg  650 mg Rectal Q6H PRN    polyethylene glycol (MIRALAX) packet 17 g  17 g Oral DAILY PRN    ondansetron (ZOFRAN ODT) tablet 4 mg  4 mg Oral Q8H PRN    Or    ondansetron (ZOFRAN) injection 4 mg  4 mg IntraVENous Q6H PRN    enoxaparin (LOVENOX) injection 40 mg  40 mg SubCUTAneous Q24H    atorvastatin (LIPITOR) tablet 80 mg  80 mg Oral DAILY    hydrOXYchloroQUINE (PLAQUENIL) tablet 200 mg  200 mg Oral BID    aspirin chewable tablet 81 mg  81 mg Oral DAILY    labetaloL (NORMODYNE;TRANDATE) 20 mg/4 mL (5 mg/mL) injection 20 mg  20 mg IntraVENous Q6H PRN    amLODIPine (NORVASC) tablet 10 mg  10 mg Oral DAILY        Lab Data Reviewed: (see below)  Lab Review:     Recent Labs     11/08/21 0348 11/07/21  1306   WBC 3.4* 3.3*   HGB 13.5 12.7   HCT 41.4 39.2    228     Recent Labs     11/08/21 0348 11/07/21  1306    140   K 2.8* 3.6    103   CO2 29 29   * 101*   BUN 10 14   CREA 0.69 0.84   CA 8.7 8.6   MG 1.6  --    ALB 3.0* 3.0*   TBILI 0.6 0.3   ALT 11* 20     No results found for: GLUCPOC  No results for input(s): PH, PCO2, PO2, HCO3, FIO2 in the last 72 hours. No results for input(s): INR, INREXT, INREXT in the last 72 hours. All Micro Results     Procedure Component Value Units Date/Time    URINE CULTURE HOLD SAMPLE [[id number]] Collected: 11/07/21 1430    Order Status: Completed Specimen: Urine from Serum Updated: 11/07/21 1435     Urine culture hold       Urine on hold in Microbiology dept for 2 days. If unpreserved urine is submitted, it cannot be used for addtional testing after 24 hours, recollection will be required. I have reviewed notes of prior 24hr. Other pertinent lab: Total time spent with patient: 28 I personally reviewed chart, notes, data and current medications in the medical record. I have personally examined and treated the patient at bedside during this period.                  Care Plan discussed with: Patient, Nursing Staff and >50% of time spent in counseling and coordination of care    Discussed:  Care Plan    Prophylaxis:  Lovenox    Disposition:  Home w/Family           ___________________________________________________    Attending Physician: Shin Cruz MD

## 2021-11-09 NOTE — PROGRESS NOTES
Patient has been identified as appropriate for acute reahb placement. Acute rehab  rehab discussed with patient and her sister Mihaela Baker. Rehab providers discussed. [facility], referral sent via [employer]. [place] of Choice obtained. Await response. Decl[facility], no [facility] available. Referral sent to [facility], await response.     Care Management Interventions  PCP Verified by CM:  (No current PCP)  Transition of Care Consult (CM Consult): Acute Rehab  Support Systems: Other Family Member(s)  Confirm Follow Up Transport: Family  The Plan for Transition of Care is Related to the Following Treatment Goals : return home at [place]  The Patient and/or Patient Representative was Provided with a Choice of Provider and Agrees with the Discharge Plan?: (S) Yes  Freedom of Choice List was Provided with Basic Dialogue that Supports the Patient's Individualized Plan of Care/Goals, Treatment Preferences and Shares the Quality Data Associated with the Providers?: (S) Yes  Discharge Location  Discharge Placement: Rehab hospital/unit acute    Fredo Rivera, RN, MSN/Care manager

## 2021-11-09 NOTE — PROGRESS NOTES
Problem: Mobility Impaired (Adult and Pediatric)  Goal: *Acute Goals and Plan of Care (Insert Text)  Description: FUNCTIONAL STATUS PRIOR TO ADMISSION: Patient was independent and active without use of DME.    HOME SUPPORT PRIOR TO ADMISSION: Patient normally lives in [place] with daughter. Does not require an assistive device, Independent with all ADL's, and was driving. Currently here in [place] visiting sister and friend. She is staying with friend    Physical Therapy Goals  Initiated 11/8/2021  1. Patient will move from supine to sit and sit to supine  in bed with minimal assistance/contact guard assist within 7 day(s). 2.  Patient will transfer from bed to chair and chair to bed with minimal assistance/contact guard assist using the least restrictive device within 7 day(s). 3.  Patient will perform sit to stand with minimal assistance/contact guard assist within 7 day(s). 4.  Patient will ambulate with minimal assistance/contact guard assist for 150 feet with the least restrictive device within 7 day(s). Note:   PHYSICAL THERAPY TREATMENT  Patient: Jannifer Boeck (13 y.o. female)  Date: 11/9/2021  Diagnosis: Right sided weakness [R53.1]  CVA (cerebral vascular accident) [facility]) [I63.9]   Right sided weakness       Precautions: Fall  Chart, physical therapy assessment, plan of care and goals were reviewed. ASSESSMENT  Patient continues with skilled PT services and is progressing towards goals. Patient now with a MRI confirmed CVA- Left posterior limb of the internal capsule. Patient more alert and able to keep eyes open for entire session  patient more talkative and communicating with therapist this date. She was able to come to sitting at edge of bed with MOD A. Once in sitting increased posterior lean and and required MIN A for maintaining balance. Patient vitals were stable throughout. No reportts of headache today.   She was able to standing: and then perform SPT to chair with MOD A. Current Level of Function Impacting Discharge (mobility/balance): MOD A with SPT, bed mobility and sit-stand, MIN A for sitting balance    Other factors to consider for discharge:          PLAN :  Patient continues to benefit from skilled intervention to address the above impairments. Continue treatment per established plan of care. to address goals. Recommendation for discharge: (in order for the patient to meet his/her long term goals)  Therapy 3 hours per day 5-7 days per week    This discharge recommendation:  A follow-up discussion with the attending provider and/or case management is planned    IF patient discharges home will need the following DME: to be determined (TBD)       SUBJECTIVE:   Patient stated .    OBJECTIVE DATA SUMMARY:   Critical Behavior:  Neurologic State: Drowsy  Orientation Level: Oriented X4  Cognition: Follows commands  Safety/Judgement: Decreased awareness of environment, Lack of insight into deficits  Vitals:    11/09/21 0826 11/09/21 0857 11/09/21 0910 11/09/21 0920   BP: 110/77 95/67 98/79 99/77   BP 1 Location: Right arm Left arm Left arm Left arm   BP Patient Position: At rest At rest Sitting Comment: post activity   Pulse:  99 (!) 103 (!) 101   Temp:       Resp:       Height:       Weight:       SpO2:  96% 95% 96%       Functional Mobility Training:  Bed Mobility:     Supine to Sit: Moderate assistance; Assist x1; Additional time  Sit to Supine: Moderate assistance; Assist x1; Additional time           Transfers:  Sit to Stand: Moderate assistance; Additional time; Assist x1  Stand to Sit: Moderate assistance; Assist x1; Additional time        Bed to Chair: Moderate assistance; Assist x1; Additional time                    Balance:     Ambulation/Gait Training:      Stand pivot to chair: Left knee buckling , no right knee buckling: MOD A for trunk                     Neuro Re-education:  Sitting: static: occasional MIN overall CGA due to increased posterior lean. Forward flexed posture requiring increased verbal and tactile cuing for upright posture  Dynamic: reaching outside base of support with Left UE MIN A for balanace and verbal cuing for sequcing     Pain Rating:  None reported    Activity Tolerance:   Good and Fair    After treatment patient left in no apparent distress:   Sitting in chair, Call bell within reach, and Bed / chair alarm activated    COMMUNICATION/COLLABORATION:   The patients plan of care was discussed with: Occupational therapist and Registered nurse.      Monico Ibrahim PT, DPT   Time Calculation: 32 mins

## 2021-11-09 NOTE — PROGRESS NOTES
Bedside and Verbal shift change report given to VIANCA LOW (oncoming nurse) by Pramod Abarca RN (offgoing nurse). Report included the following information SBAR, Kardex, Intake/Output, MAR, Accordion and Recent Results. This patient was assisted with Intentional Toileting every 2 hours during this shift as appropriate. Documentation of ambulation and output reflected on Flowsheet as appropriate. Purposeful hourly rounding was completed using AIDET and 5Ps. Outcomes of PHR documented as they occurred. Bed alarm in use as appropriate. Dual Suction and ambubag in place. Stroke Education provided to patient and the following topics were discussed    1. Patients personal risk factors for stroke are hypertension    2. Warning signs of Stroke:        * Sudden numbness or weakness of the face, arm or leg, especially on one side of          The body            * Sudden confusion, trouble speaking or understanding        * Sudden trouble seeing in one or both eyes        * Sudden trouble walking, dizziness, loss of balance or coordination        * Sudden severe headache with no known cause      3. Importance of activation Emergency Medical Services ( 9-1-1 ) immediately if experience any warning signs of stroke. 4. Be sure and schedule a follow-up appointment with your primary care doctor or any specialists as instructed. 5. You must take medicine every day to treat your risk factors for stroke. Be sure to take your medicines exactly as your doctor tells you: no more, no less. Know what your medicines are for , what they do. Anti-thrombotics /anticoagulants can help prevent strokes. You are taking the following medicine(s)  Norvasc, aspirin, lipitor, lovenox    6. Smoking and second-hand smoke greatly increase your risk of stroke, cardiovascular disease and death. Smoking history never    7. Information provided was [facility] Stroke Education Binder, Stroke Handouts or Verbal Education    8.  Documentation of teaching completed in Patient Education Activity and on Care Plan with teaching response noted? Yes    1900 - Bedside and Verbal shift change report given to [address] Rogers (oncoming nurse) by Tisha Dyer RN (offgoing nurse). Report included the following information SBAR, Kardex, Intake/Output, MAR, Accordion and Recent Results.

## 2021-11-10 LAB
ANION GAP SERPL CALC-SCNC: 4 MMOL/L (ref 5–15)
BASOPHILS # BLD: 0 K/UL (ref 0–0.1)
BASOPHILS NFR BLD: 0 % (ref 0–1)
BUN SERPL-MCNC: 30 MG/DL (ref 6–20)
BUN/CREAT SERPL: 25 (ref 12–20)
CALCIUM SERPL-MCNC: 8.7 MG/DL (ref 8.5–10.1)
CHLORIDE SERPL-SCNC: 101 MMOL/L (ref 97–108)
CO2 SERPL-SCNC: 29 MMOL/L (ref 21–32)
CREAT SERPL-MCNC: 1.19 MG/DL (ref 0.55–1.02)
DIFFERENTIAL METHOD BLD: ABNORMAL
EOSINOPHIL # BLD: 0.1 K/UL (ref 0–0.4)
EOSINOPHIL NFR BLD: 4 % (ref 0–7)
ERYTHROCYTE [DISTWIDTH] IN BLOOD BY AUTOMATED COUNT: 12.8 % (ref 11.5–14.5)
GLUCOSE SERPL-MCNC: 110 MG/DL (ref 65–100)
HCT VFR BLD AUTO: 41 % (ref 35–47)
HGB BLD-MCNC: 13.2 G/DL (ref 11.5–16)
IMM GRANULOCYTES # BLD AUTO: 0 K/UL (ref 0–0.04)
IMM GRANULOCYTES NFR BLD AUTO: 0 % (ref 0–0.5)
LYMPHOCYTES # BLD: 1.5 K/UL (ref 0.8–3.5)
LYMPHOCYTES NFR BLD: 55 % (ref 12–49)
MAGNESIUM SERPL-MCNC: 2.2 MG/DL (ref 1.6–2.4)
MCH RBC QN AUTO: 29.6 PG (ref 26–34)
MCHC RBC AUTO-ENTMCNC: 32.2 G/DL (ref 30–36.5)
MCV RBC AUTO: 91.9 FL (ref 80–99)
MONOCYTES # BLD: 0.3 K/UL (ref 0–1)
MONOCYTES NFR BLD: 11 % (ref 5–13)
NEUTS SEG # BLD: 0.8 K/UL (ref 1.8–8)
NEUTS SEG NFR BLD: 30 % (ref 32–75)
NRBC # BLD: 0 K/UL (ref 0–0.01)
NRBC BLD-RTO: 0 PER 100 WBC
PLATELET # BLD AUTO: 226 K/UL (ref 150–400)
PMV BLD AUTO: 10.3 FL (ref 8.9–12.9)
POTASSIUM SERPL-SCNC: 3.9 MMOL/L (ref 3.5–5.1)
RBC # BLD AUTO: 4.46 M/UL (ref 3.8–5.2)
RBC MORPH BLD: ABNORMAL
SODIUM SERPL-SCNC: 134 MMOL/L (ref 136–145)
WBC # BLD AUTO: 2.7 K/UL (ref 3.6–11)

## 2021-11-10 PROCEDURE — 83735 ASSAY OF MAGNESIUM: CPT

## 2021-11-10 PROCEDURE — 80048 BASIC METABOLIC PNL TOTAL CA: CPT

## 2021-11-10 PROCEDURE — 97530 THERAPEUTIC ACTIVITIES: CPT

## 2021-11-10 PROCEDURE — 97112 NEUROMUSCULAR REEDUCATION: CPT

## 2021-11-10 PROCEDURE — 36415 COLL VENOUS BLD VENIPUNCTURE: CPT

## 2021-11-10 PROCEDURE — 85025 COMPLETE CBC W/AUTO DIFF WBC: CPT

## 2021-11-10 PROCEDURE — 65660000000 HC RM CCU STEPDOWN

## 2021-11-10 PROCEDURE — 97116 GAIT TRAINING THERAPY: CPT

## 2021-11-10 PROCEDURE — 74011250636 HC RX REV CODE- 250/636: Performed by: INTERNAL MEDICINE

## 2021-11-10 PROCEDURE — 74011250637 HC RX REV CODE- 250/637: Performed by: INTERNAL MEDICINE

## 2021-11-10 PROCEDURE — 74011636637 HC RX REV CODE- 636/637: Performed by: INTERNAL MEDICINE

## 2021-11-10 RX ADMIN — PANTOPRAZOLE SODIUM 40 MG: 40 TABLET, DELAYED RELEASE ORAL at 05:42

## 2021-11-10 RX ADMIN — TRAZODONE HYDROCHLORIDE 300 MG: 100 TABLET ORAL at 21:05

## 2021-11-10 RX ADMIN — HYDROXYCHLOROQUINE SULFATE 200 MG: 200 TABLET ORAL at 08:17

## 2021-11-10 RX ADMIN — ASPIRIN 81 MG: 81 TABLET, CHEWABLE ORAL at 08:17

## 2021-11-10 RX ADMIN — Medication 10 ML: at 15:58

## 2021-11-10 RX ADMIN — ATORVASTATIN CALCIUM 80 MG: 20 TABLET, FILM COATED ORAL at 08:17

## 2021-11-10 RX ADMIN — HYDROXYCHLOROQUINE SULFATE 200 MG: 200 TABLET ORAL at 17:26

## 2021-11-10 RX ADMIN — PREGABALIN 150 MG: 100 CAPSULE ORAL at 17:25

## 2021-11-10 RX ADMIN — AMLODIPINE BESYLATE 10 MG: 5 TABLET ORAL at 08:17

## 2021-11-10 RX ADMIN — Medication 10 ML: at 05:42

## 2021-11-10 RX ADMIN — ENOXAPARIN SODIUM 40 MG: 100 INJECTION SUBCUTANEOUS at 15:57

## 2021-11-10 RX ADMIN — Medication 10 ML: at 21:05

## 2021-11-10 RX ADMIN — TRAMADOL HYDROCHLORIDE 50 MG: 50 TABLET, COATED ORAL at 08:17

## 2021-11-10 RX ADMIN — PREGABALIN 150 MG: 100 CAPSULE ORAL at 08:17

## 2021-11-10 RX ADMIN — PREDNISONE 10 MG: 5 TABLET ORAL at 08:17

## 2021-11-10 NOTE — PROGRESS NOTES
Bedside shift change report given to [facility] (oncoming nurse) by Misbah Rodriguez RN (offgoing nurse). Report included the following information SBAR, Kardex, ED Summary, Intake/Output, and Recent Results. Stroke Education provided to patient and the following topics were discussed     1. Patients personal risk factors for stroke are hypertension     2. Warning signs of Stroke:         * Sudden numbness or weakness of the face, arm or leg, especially on one side of          The body            * Sudden confusion, trouble speaking or understanding         * Sudden trouble seeing in one or both eyes         * Sudden trouble walking, dizziness, loss of balance or coordination         * Sudden severe headache with no known cause        3. Importance of activation Emergency Medical Services ( 9-1-1 ) immediately if experience any warning signs of stroke.     4. Be sure and schedule a follow-up appointment with your primary care doctor or any specialists as instructed.      5. You must take medicine every day to treat your risk factors for stroke.  Be sure to take your medicines exactly as your doctor tells you: no more, no less.  Know what your medicines are for , what they do.  Anti-thrombotics /anticoagulants can help prevent strokes.  You are taking the following medicine(s)  Norvasc, aspirin, lipitor, lovenox     6.  Smoking and second-hand smoke greatly increase your risk of stroke, cardiovascular disease and death. Smoking history never     7. Information provided was [facility] Stroke Education Binder, Stroke Handouts or Verbal Education     8. Documentation of teaching completed in Patient Education Activity and on Care Plan with teaching response noted? Yes    Bedside shift change report given to Heide LOW (oncoming nurse) by [facility] (offgoing nurse). Report included the following information SBAR, Kardex, ED Summary, Intake/Output, and Recent Results.        This patient was assisted with Intentional Toileting every 2 hours during this shift as appropriate. Documentation of ambulation and output reflected on Flowsheet as appropriate. Purposeful hourly rounding was completed using AIDET and 5Ps. Outcomes of PHR documented as they occurred. Bed alarm in use as appropriate. Dual Suction and ambubag in place.

## 2021-11-10 NOTE — PROGRESS NOTES
Patient's current insurance information obtained and provided to [employer]. Await response regarding referral to [facility].     Maria Elena Magallon RN, MSN/Care manager  [phone]

## 2021-11-10 NOTE — PROGRESS NOTES
TRANSFER - OUT REPORT:    Verbal report given to Ruma Olivares (name) on [phone]  (patient name)  being transferred to [phone] (unit) for routine progression of care   Report consisted of patients Situation, Background, Assessment and   Recommendations(SBAR).      Care Management Interventions  PCP Verified by CM:  (No current PCP)  Transition of Care Consult (CM Consult): Acute Rehab  Support Systems: Other Family Member(s)  Confirm Follow Up Transport: Family  The Plan for Transition of Care is Related to the Following Treatment Goals : return home at [place]  The Patient and/or Patient Representative was Provided with a Choice of Provider and Agrees with the Discharge Plan?: (S) Yes  Freedom of Choice List was Provided with Basic Dialogue that Supports the Patient's Individualized Plan of Care/Goals, Treatment Preferences and Shares the Quality Data Associated with the Providers?: (S) Yes  Discharge Location  Discharge Placement: Rehab hospital/unit acute    Man Dumas RN, MSN/Care manager  [phone]

## 2021-11-10 NOTE — PROGRESS NOTES
0700 - Bedside and Verbal shift change report given to VIANCA RN (oncoming nurse) by Nesha Bhandari RN (offgoing nurse). Report included the following information SBAR, Kardex, Intake/Output, MAR, Accordion and Recent Results. This patient was assisted with Intentional Toileting every 2 hours during this shift as appropriate. Documentation of ambulation and output reflected on Flowsheet as appropriate. Purposeful hourly rounding was completed using AIDET and 5Ps. Outcomes of PHR documented as they occurred. Bed alarm in use as appropriate. Dual Suction and ambubag in place. TRANSFER - OUT REPORT:    Verbal report given to Dayna Ty (name) on Jaycee Álvarez  being transferred to [address] [place] [address] 41 12 (unit) for routine progression of care       Report consisted of patients Situation, Background, Assessment and   Recommendations(SBAR). Information from the following report(s) SBAR, Kardex, Intake/Output, MAR, Accordion and Recent Results was reviewed with the receiving nurse. Lines:   Peripheral IV 11/07/21 Left Antecubital (Active)   Site Assessment Clean, dry, & intact 11/10/21 0734   Phlebitis Assessment 0 11/10/21 0734   Infiltration Assessment 0 11/10/21 0734   Dressing Status Clean, dry, & intact 11/10/21 0734   Dressing Type Transparent 11/10/21 0734   Hub Color/Line Status Pink; Capped; Flushed 11/10/21 0734   Action Taken Open ports on tubing capped 11/10/21 0734   Alcohol Cap Used Yes 11/10/21 0734        Opportunity for questions and clarification was provided.       Patient transported with:   Registered Nurse

## 2021-11-10 NOTE — PROGRESS NOTES
Problem: Mobility Impaired (Adult and Pediatric)  Goal: *Acute Goals and Plan of Care (Insert Text)  Description: FUNCTIONAL STATUS PRIOR TO ADMISSION: Patient was independent and active without use of DME.    HOME SUPPORT PRIOR TO ADMISSION: Patient normally lives in [place] with daughter. Does not require an assistive device, Independent with all ADL's, and was driving. Currently here in [place] visiting sister and friend. She is staying with friend    Physical Therapy Goals  Initiated 11/8/2021  1. Patient will move from supine to sit and sit to supine  in bed with minimal assistance/contact guard assist within 7 day(s). 2.  Patient will transfer from bed to chair and chair to bed with minimal assistance/contact guard assist using the least restrictive device within 7 day(s). 3.  Patient will perform sit to stand with minimal assistance/contact guard assist within 7 day(s). 4.  Patient will ambulate with minimal assistance/contact guard assist for 150 feet with the least restrictive device within 7 day(s). Note:   PHYSICAL THERAPY TREATMENT  Patient: Elvis Woo (16 y.o. female)  Date: 11/10/2021  Diagnosis: Right sided weakness [R53.1]  CVA (cerebral vascular accident) [facility]) [I63.9]   Right sided weakness       Precautions: Fall  Chart, physical therapy assessment, plan of care and goals were reviewed. ASSESSMENT  Patient continues with skilled PT services. Pt supine to sit with min to mod assist of 2. Pt sit to stand with min to mod assist of 2. Pt ambulated 4ft with min to mod hand held assist of 2. Pt needs postural cues for trunk control. Pt left sitting in chair. Pt will benefit from RW next treatment. Pt progressing slowly. Pt is a good rehab candidate. Continue goals. PLAN :  Patient continues to benefit from skilled intervention to address the above impairments. Continue treatment per established plan of care. to address goals.     Recommendation for discharge: (in order for the patient to meet his/her long term goals)  Therapy 3 hours per day 5-7 days per week    This discharge recommendation:  Has been made in collaboration with the attending provider and/or case management    IF patient discharges home will need the following DME: to be determined (TBD)       SUBJECTIVE:       OBJECTIVE DATA SUMMARY:   Critical Behavior:  Neurologic State: Drowsy  Orientation Level: Oriented X4  Cognition: Follows commands  Safety/Judgement: Decreased awareness of environment, Lack of insight into deficits  Functional Mobility Training:  Bed Mobility:   Supine to sit with min to mod assist of 2. Transfers:   Pt sit to stand with min to mod assist of 2           Balance:  Sitting - Static: Fair (occasional)  Ambulation/Gait Training:  Distance (ft): 4 Feet (ft)  Assistive Device:Hand held of 2  Ambulation - Level of Assistance:  Moderate assistance; Assist x2        Gait Abnormalities: Decreased step clearance        Base of Support: Narrowed     Speed/Zhane: Pace decreased (<100 feet/min)  Step Length: Right shortened; Left shortened          Activity Tolerance:   Fair    After treatment patient left in no apparent distress:   Sitting in chair    COMMUNICATION/COLLABORATION:   The patients plan of care was discussed with: Physical therapist.     Cassi Wright PTA   Time Calculation: 23 mins

## 2021-11-10 NOTE — PROGRESS NOTES
0730 Bedside and Verbal shift change report given to April and [address] (oncoming nurse) by [facility] (offgoing nurse). Report included the following information SBAR and Kardex. This patient was assisted with Intentional Toileting every 2 hours during this shift as appropriate. Documentation of ambulation and output reflected on Flowsheet as appropriate. Purposeful hourly rounding was completed using AIDET and 5Ps. Outcomes of PHR documented as they occurred. Bed alarm in use as appropriate. Dual Suction and ambubag in place.

## 2021-11-10 NOTE — PROGRESS NOTES
Problem: Self Care Deficits Care Plan (Adult)  Goal: *Acute Goals and Plan of Care (Insert Text)  Description: FUNCTIONAL STATUS PRIOR TO ADMISSION: Patient was independent and active without use of DME.    HOME SUPPORT PRIOR TO ADMISSION: Patient normally lives in [place] with daughter. Does not require an assistive device, Independent with all ADL's, and was driving. Currently here in [place] visiting sister and friend. She is staying with friend    Occupational Therapy Goals  Initiated 11/8/2021  1. Patient will perform grooming with supervision/setup using R, dominant, UE as fine manipulator within 7 day(s). 2.  Patient will perform upper body dressing and bathing with minimum assistance assistance within 7 day(s). 3.  Patient will perform lower body dressing and bathing with moderate assistance assistance within 7 day(s). 4.  Patient will perform toilet transfers with moderate assistance to [facility] using best technique within 7 day(s). 5   Patient will participate in upper extremity therapeutic exercise/activities with minimal assistance with focus on R, dominant, UE strength, coordination, and attention within 7 day(s). 6.  Patient will use R, dominant, UE 75% of the time as fine manipulator with no cuing to increase independence with all ADL tasks within 7 days. 7.  Patient will engage in full Fugl-Zepeda Assessment of Upper Extremity outcome measure within 7 days. Outcome: Progressing Towards Goal   OCCUPATIONAL THERAPY TREATMENT  Patient: Iqra Gandara (81 y.o. female)  Date: 11/10/2021  Diagnosis: Right sided weakness [R53.1]  CVA (cerebral vascular accident) [facility]) [I63.9]   Right sided weakness       Precautions: Fall  Chart, occupational therapy assessment, plan of care, and goals were reviewed. ASSESSMENT  Patient continues with skilled OT services and is progressing towards goals.   Ms. Florence Thompson was received in bed with two friends present who she requested to remain in the room for family training. Patient presents today with improved arousal and engagement in activity and generally improved activity tolerance. Patient continues to be limited by R side weakness, impaired coordination on the R, impaired balance, and decreased initiation with R dominant UE. Patient performed transfers with up to mod A with hand held assist.  She was able to transfer OOB to the chair and to [facility] for toileting needs. Supported sitting activity (from the chair) focused on neuro re-education exercises/activity and engagement in Fugl-Zepeda Assessment of Upper Extremity. Patient would benefit from continued skilled OT to progress towards goals and improve overall independence. Would continue to highly recommend inpatient rehab at discharge. Current Level of Function Impacting Discharge (ADLs): Patient required min A for bed mobility and mod A for OOB transfers. Patient required max A overall for toileting. Patient with good tolerance for neuro re-education ex/activity. PLAN :  Patient continues to benefit from skilled intervention to address the above impairments. Continue treatment per established plan of care to address goals. Recommend with staff:   Recommend patient be OOB to chair as frequently as tolerated; Goal of 3x/day for all meals for 60 minutes at a time. For toileting needs, recommend BSC or transfer to bathroom with staff assist.    Encourage patient involvement in personal care as able. Encourage exercises frequently throughout the day. Recommend next OT session: Continue towards set goals. Recommendation for discharge: (in order for the patient to meet his/her long term goals)  Therapy 3 hours per day 5-7 days per week    This discharge recommendation:  Has been made in collaboration with the attending provider and/or case management    IF patient discharges home will need the following DME: none       SUBJECTIVE:   Patient stated Will rehab fix my stoke?  Explained the role of OT and potential rehab goals. Patient with better understanding of neuro based rehab following education. OBJECTIVE DATA SUMMARY:   Cognitive/Behavioral Status:  Neurologic State: Alert  Orientation Level: Oriented X4  Cognition: Follows commands  Perception: Appears intact  Perseveration: No perseveration noted  Safety/Judgement: Awareness of environment    Functional Mobility and Transfers for ADLs:  Bed Mobility:  Supine to Sit: Minimum assistance; Assist x1; Additional time  Sit to Supine:  (pt remained up at end of tx)    Transfers:  Sit to Stand: Moderate assistance  Functional Transfers  Toilet Transfer : Moderate assistance  Bed to Chair: Moderate assistance    Balance:  Sitting: Impaired  Sitting - Static: Fair (occasional)  Sitting - Dynamic: Fair (occasional)  Standing: Impaired  Standing - Static: Fair  Standing - Dynamic : Poor    ADL Intervention:  Toileting  Toileting Assistance: Maximum assistance  Bladder Hygiene:  Moderate assistance  Bowel Hygiene: Maximum assistance  Clothing Management: Maximum assistance  Cues: Verbal cues provided  Adaptive Equipment:  (BSC)    Cognitive Retraining  Safety/Judgement: Awareness of environment    Neuro Re-education:   Fugl-Zepeda Assessment of Motor Recovery after Stroke:     Reflex Activity  Flexors/Biceps/Fingers: Can be elicited  Extensors/Triceps: Can be elicited  Reflex Subtotal: 4    Volitional Movement Within Synergies  Shoulder Retraction: Full  Shoulder Elevation: Full  Shoulder Abduction (90 degrees): Full  Shoulder External Rotation: Full  Elbow Flexion: Full  Forearm Supination: Full  Shoulder Adduction/Internal Rotation: Full  Elbow Extension: Full  Forearm Pronation: Full  Subtotal: 18    Volitional Movement Mixing Synergies  Hand to Lumbar Spine: Full  Shoulder Flexion (0-90 degrees): Full  Pronation-Supination: Full  Subtotal: 6    Volitional Movement With Little or No Synergy  Shoulder Abduction (0-90 degrees): Full  Shoulder Flexion ( degrees): Partial  Pronation/Supination: Full  Subtotal : 5    Normal Reflex Activity  Biceps, Triceps, Finger Flexors: Full  Subtotal : 2    Upper Extremity Total   Upper Extremity Total: 35    Wrist  Stability at 15 Degree Dorsiflexion: Partial  Repeated Dorsiflexion/ Volar Flexion: Full  Stability at 15 Degree Dorsiflexion: Partial  Repeated Dorsiflexion/ Volar Flexion: Full  Circumduction: Partial  Wrist Total: 7    Hand  Mass Flexion: Full  Mass Extension: Full  Grasp A: Partial  Grasp B: Partial  Grasp C: Partial  Grasp D: Partial  Grasp E: Partial  Hand Total: 9    Coordination/Speed  Tremor: None  Dysmetria: Slight  Time: 2-5s  Coordination/Speed Total : 4    Total A-D  Total A-D (Motor Function): 55/66         This is a reliable/valid measure of arm function after a neurological event. It has established value to characterize functional status and for measuring spontaneous and therapy-induced recovery; tests proximal and distal motor functions. Fugl-Zepeda Assessment - UE scores recorded between five and 30 days post neurologic event can be used to predict UE recovery at six months post neurologic event. Severe = 0-21 points   Moderately Severe = 22-33 points   Moderate = 34-47 points   Mild = 48-66 points  RONDA Buchanan, BRIANA David, & PETER Charlton (1992). Measurement of motor recovery after stroke: Outcome assessment and sample size requirements.  Stroke, 23, pp. 5273-2290.   --------------------------------------------------------------------------------------------------------------------------------------------------------------------  MCID:  Stroke:   Wilkins Bloch et al, 2001; n = 171; mean age 79 (6) years; assessed within 16 (12) days of stroke, Acute Stroke)  FMA Motor Scores from Admission to Discharge    10 point increase in FMA Upper Extremity = 1.5 change in discharge FIM    10 point increase in FMA Lower Extremity = 1.9 change in discharge FIM  MDC:   Stroke:   Chava Adam et al, 2008, n = 14, mean age = 59.9 (14.6) years, assessed on average 14 (6.5) months post stroke, Chronic Stroke)    FMA = 5.2 points for the Upper Extremity portion of the assessment          Activity Tolerance:   Good    After treatment patient left in no apparent distress:   Sitting in chair, Call bell within reach, Bed / chair alarm activated, and Caregiver / family present    COMMUNICATION/COLLABORATION:   The patients plan of care was discussed with: Physical therapy assistant, Registered nurse, and patient .      Inga Burgess OTR/L  Time Calculation: 38 mins

## 2021-11-10 NOTE — PROGRESS NOTES
[facility]  [address] [facility], [place], [address] [place] [address]  [phone]      Medical Progress Note      NAME: Joshua Brown   :  1965  MRM:  [id number]    Date of service: 11/10/2021        Assessment and Plan:   1. Acute CVA/ Right sided weakness -  MRI brain: Acute small left posterior internal capsule/corona radiata lacunar infarct. Continue ASA. Fall precautions and PT/OT eval.  Increased atorvastatin to 80. echo is unremarkable. Hold trazodone and lorazepam. Awaiting placement       #DELANEY: Hold lasix, appears prerenal    2. Lupus erythematosus - Continue plaquenil     3.  Pulmonary hypertension? / Bilateral lower extremity edema - Hold lasix.      4.  Neutropenia, chronic. Suspect this is due to Lupus     5. GERD (gastroesophageal reflux disease) - PPI     6. Osteopenia - hold alendronate              Subjective:     Chief Complaint[de-identified] Archibald Skains, feels like she is making small improvements, but still incredibly weak    ROS:  (bold if positive, if negative)    Tolerating PT  Tolerating Diet        Objective:     Last 24hrs VS reviewed since prior progress note.  Most recent are:    Visit Vitals  /85 (BP 1 Location: Left upper arm, BP Patient Position: At rest)   Pulse 92   Temp 97.9 °F (36.6 °C)   Resp 16   Ht 5' 7\" (1.702 m)   Wt 74.4 kg (164 lb 0.4 oz)   SpO2 97%   BMI 25.69 kg/m²     SpO2 Readings from Last 6 Encounters:   11/10/21 97%   21 98%   19 98%            Intake/Output Summary (Last 24 hours) at 11/10/2021 1410  Last data filed at 11/10/2021 0827  Gross per 24 hour   Intake 410 ml   Output 2 ml   Net 408 ml        Physical Exam:    Gen:  Well-developed, well-nourished, in no acute distress  HEENT:  Pink conjunctivae, PERRL, hearing intact to voice, moist mucous membranes  Neck:  Supple, without masses, thyroid non-tender  Resp:  No accessory muscle use, clear breath sounds without wheezes rales or rhonchi  Card:  No murmurs, normal S1, S2 without thrills, bruits or peripheral edema  Abd:  Soft, non-tender, non-distended, normoactive bowel sounds are present, no palpable organomegaly and no detectable hernias  Lymph:  No cervical or inguinal adenopathy  Musc:  No cyanosis or clubbing  Skin:  No rashes or ulcers, skin turgor is good  Neuro:  Cranial nerves are grossly intact, Rt side weakness strength 3/5 RUL and RLL  Psych:  Good insight, oriented to person, place and time, alert  __________________________________________________________________  Medications Reviewed: (see below)  Medications:     Current Facility-Administered Medications   Medication Dose Route Frequency    pantoprazole (PROTONIX) tablet 40 mg  40 mg Oral ACB    predniSONE (DELTASONE) tablet 10 mg  10 mg Oral DAILY WITH BREAKFAST    pregabalin (LYRICA) capsule 150 mg  150 mg Oral BID    traMADoL (ULTRAM) tablet 50 mg  50 mg Oral Q6H PRN    traZODone (DESYREL) tablet 300 mg  300 mg Oral QHS    sodium chloride (NS) flush 5-40 mL  5-40 mL IntraVENous Q8H    sodium chloride (NS) flush 5-40 mL  5-40 mL IntraVENous PRN    acetaminophen (TYLENOL) tablet 650 mg  650 mg Oral Q6H PRN    Or    acetaminophen (TYLENOL) suppository 650 mg  650 mg Rectal Q6H PRN    polyethylene glycol (MIRALAX) packet 17 g  17 g Oral DAILY PRN    ondansetron (ZOFRAN ODT) tablet 4 mg  4 mg Oral Q8H PRN    Or    ondansetron (ZOFRAN) injection 4 mg  4 mg IntraVENous Q6H PRN    enoxaparin (LOVENOX) injection 40 mg  40 mg SubCUTAneous Q24H    atorvastatin (LIPITOR) tablet 80 mg  80 mg Oral DAILY    hydrOXYchloroQUINE (PLAQUENIL) tablet 200 mg  200 mg Oral BID    aspirin chewable tablet 81 mg  81 mg Oral DAILY    labetaloL (NORMODYNE;TRANDATE) 20 mg/4 mL (5 mg/mL) injection 20 mg  20 mg IntraVENous Q6H PRN    amLODIPine (NORVASC) tablet 10 mg  10 mg Oral DAILY        Lab Data Reviewed: (see below)  Lab Review:     Recent Labs     11/10/21  0153 11/08/21  0348   WBC 2.7* 3.4*   HGB 13.2 13.5   HCT 41.0 41.4  220     Recent Labs     11/10/21  0153 11/08/21  0348   * 136   K 3.9 2.8*    101   CO2 29 29   * 111*   BUN 30* 10   CREA 1.19* 0.69   CA 8.7 8.7   MG 2.2 1.6   ALB  --  3.0*   TBILI  --  0.6   ALT  --  11*     No results found for: GLUCPOC  No results for input(s): PH, PCO2, PO2, HCO3, FIO2 in the last 72 hours. No results for input(s): INR, INREXT, INREXT in the last 72 hours. All Micro Results     Procedure Component Value Units Date/Time    URINE CULTURE HOLD SAMPLE [[id number]] Collected: 11/07/21 1430    Order Status: Completed Specimen: Urine from Serum Updated: 11/07/21 1435     Urine culture hold       Urine on hold in Microbiology dept for 2 days. If unpreserved urine is submitted, it cannot be used for addtional testing after 24 hours, recollection will be required. I have reviewed notes of prior 24hr. Other pertinent lab: Total time spent with patient: 28 I personally reviewed chart, notes, data and current medications in the medical record. I have personally examined and treated the patient at bedside during this period.                  Care Plan discussed with: Patient, Nursing Staff and >50% of time spent in counseling and coordination of care    Discussed:  Care Plan    Prophylaxis:  Lovenox    Disposition:  Home w/Family           ___________________________________________________    Attending Physician: Saurav De Leon MD

## 2021-11-11 LAB
ANION GAP SERPL CALC-SCNC: 2 MMOL/L (ref 5–15)
BUN SERPL-MCNC: 27 MG/DL (ref 6–20)
BUN/CREAT SERPL: 32 (ref 12–20)
CALCIUM SERPL-MCNC: 8.3 MG/DL (ref 8.5–10.1)
CHLORIDE SERPL-SCNC: 105 MMOL/L (ref 97–108)
CO2 SERPL-SCNC: 29 MMOL/L (ref 21–32)
CREAT SERPL-MCNC: 0.85 MG/DL (ref 0.55–1.02)
CRP SERPL-MCNC: 2.28 MG/DL (ref 0–0.6)
ERYTHROCYTE [SEDIMENTATION RATE] IN BLOOD: 66 MM/HR (ref 0–30)
GLUCOSE SERPL-MCNC: 117 MG/DL (ref 65–100)
POTASSIUM SERPL-SCNC: 3.7 MMOL/L (ref 3.5–5.1)
SODIUM SERPL-SCNC: 136 MMOL/L (ref 136–145)

## 2021-11-11 PROCEDURE — 97116 GAIT TRAINING THERAPY: CPT

## 2021-11-11 PROCEDURE — 65660000000 HC RM CCU STEPDOWN

## 2021-11-11 PROCEDURE — 74011636637 HC RX REV CODE- 636/637: Performed by: INTERNAL MEDICINE

## 2021-11-11 PROCEDURE — 86140 C-REACTIVE PROTEIN: CPT

## 2021-11-11 PROCEDURE — 77030027138 HC INCENT SPIROMETER -A

## 2021-11-11 PROCEDURE — 86160 COMPLEMENT ANTIGEN: CPT

## 2021-11-11 PROCEDURE — 80048 BASIC METABOLIC PNL TOTAL CA: CPT

## 2021-11-11 PROCEDURE — 74011250636 HC RX REV CODE- 250/636: Performed by: INTERNAL MEDICINE

## 2021-11-11 PROCEDURE — 74011250637 HC RX REV CODE- 250/637: Performed by: INTERNAL MEDICINE

## 2021-11-11 PROCEDURE — 97535 SELF CARE MNGMENT TRAINING: CPT

## 2021-11-11 PROCEDURE — 36415 COLL VENOUS BLD VENIPUNCTURE: CPT

## 2021-11-11 PROCEDURE — 86200 CCP ANTIBODY: CPT

## 2021-11-11 PROCEDURE — 86431 RHEUMATOID FACTOR QUANT: CPT

## 2021-11-11 PROCEDURE — 97530 THERAPEUTIC ACTIVITIES: CPT

## 2021-11-11 PROCEDURE — 85652 RBC SED RATE AUTOMATED: CPT

## 2021-11-11 RX ORDER — PREDNISONE 20 MG/1
40 TABLET ORAL
Status: DISCONTINUED | OUTPATIENT
Start: 2021-11-11 | End: 2021-11-16

## 2021-11-11 RX ADMIN — Medication 5 ML: at 14:32

## 2021-11-11 RX ADMIN — PREDNISONE 10 MG: 5 TABLET ORAL at 09:36

## 2021-11-11 RX ADMIN — Medication 10 ML: at 22:28

## 2021-11-11 RX ADMIN — HYDROXYCHLOROQUINE SULFATE 200 MG: 200 TABLET ORAL at 09:36

## 2021-11-11 RX ADMIN — ONDANSETRON 4 MG: 2 INJECTION INTRAMUSCULAR; INTRAVENOUS at 23:16

## 2021-11-11 RX ADMIN — PREGABALIN 150 MG: 100 CAPSULE ORAL at 09:36

## 2021-11-11 RX ADMIN — HYDROXYCHLOROQUINE SULFATE 200 MG: 200 TABLET ORAL at 17:21

## 2021-11-11 RX ADMIN — Medication 10 ML: at 06:22

## 2021-11-11 RX ADMIN — TRAZODONE HYDROCHLORIDE 300 MG: 100 TABLET ORAL at 22:27

## 2021-11-11 RX ADMIN — ATORVASTATIN CALCIUM 80 MG: 20 TABLET, FILM COATED ORAL at 09:36

## 2021-11-11 RX ADMIN — PANTOPRAZOLE SODIUM 40 MG: 40 TABLET, DELAYED RELEASE ORAL at 06:23

## 2021-11-11 RX ADMIN — ENOXAPARIN SODIUM 40 MG: 100 INJECTION SUBCUTANEOUS at 14:32

## 2021-11-11 RX ADMIN — PREDNISONE 40 MG: 20 TABLET ORAL at 17:51

## 2021-11-11 RX ADMIN — PREGABALIN 150 MG: 100 CAPSULE ORAL at 17:22

## 2021-11-11 RX ADMIN — ASPIRIN 81 MG: 81 TABLET, CHEWABLE ORAL at 09:36

## 2021-11-11 RX ADMIN — AMLODIPINE BESYLATE 10 MG: 5 TABLET ORAL at 09:36

## 2021-11-11 NOTE — PROGRESS NOTES
Spiritual Care Assessment/Progress Note  [address]      NAME: Sylvia Bazan      MRN: [id number]  AGE: 55 y.o. SEX: female  Baptism Affiliation: Non Sabianism   Language: English     11/11/2021     Total Time (in minutes): 13     Spiritual Assessment begun in [facility]  MED SURG 2 through conversation with:         [x]Patient        [] Family    [x] Friend(s)        Reason for Consult: Initial/Spiritual assessment, patient floor     Spiritual beliefs: (Please include comment if needed)     [] Identifies with a biju tradition:         [] Supported by a biju community:            [] Claims no spiritual orientation:           [] Seeking spiritual identity:                [] Adheres to an individual form of spirituality:           [x] Not able to assess:                           Identified resources for coping:      [] Prayer                               [] Music                  [] Guided Imagery     [x] Family/friends                 [] Pet visits     [] Devotional reading                         [] Unknown     [] Other:                                             Interventions offered during this visit: (See comments for more details)    Patient Interventions: Catharsis/review of pertinent events in supportive environment, Coping skills reviewed/reinforced           Plan of Care:     [] Support spiritual and/or cultural needs    [] Support AMD and/or advance care planning process      [] Support grieving process   [] Coordinate Rites and/or Rituals    [] Coordination with community clergy   [] No spiritual needs identified at this time   [] Detailed Plan of Care below (See Comments)  [] Make referral to Music Therapy  [] Make referral to Pet Therapy     [] Make referral to Addiction services  [] Make referral to [facility]  [] Make referral to Spiritual Care Partner  [] No future visits requested        [x] Contact Spiritual Care for further referrals     Comments:  visited  French Phillips for an initial spiritual assessment on the [facility]. Mrs. French Phillips was awake, alert, and sitting up in her recliner when the  came into the room. A friend was sitting at the bedside.  introduced herself and extended support. Mrs. French Phillips shared that she was doing well and was looking forward to getting out of the hospital and going to rehab. She thanked the  for stopping by and expressed no needs at this time. 's are available for further support upon referral  Suresh Edmonds.      Paging Service: [phone])

## 2021-11-11 NOTE — PROGRESS NOTES
Luis Fernando Villarreal [facility] 79  380 [facility], [address] [place] [address]  [phone]      Medical Progress Note      NAME: Iqra Gandara   :  1965  MRM:  [id number]    Date of service: 2021        Assessment and Plan:   #Acute CVA/ Right sided weakness -  MRI brain: Acute small left posterior internal capsule/corona radiata lacunar infarct. Continue ASA. Fall precautions and PT/OT eval.  Increased atorvastatin to 80. echo is unremarkable. Hold trazodone and lorazepam. Awaiting placement       #DELANEY: Holding lasix as appears prerenal; improving    #R wrist, shoulder, elbow pain: See has CMC warmth as well as swan-necking so query RA overlap. Did check inflammatory markers, which are fairly elevated despite bein landry chronic pred 10mg   - Burst pred 40mg   - Check RF, CCP, as well C3, C4 levels    #Lupus erythematosus - On HCQ, but eval as above     #Pulmonary hypertension? / Bilateral lower extremity edema - Echo not c/w this and I'm not sure where the diagnosis comes from. Hold lasix as she is still dry     #Neutropenia, chronic. Suspect this is due to Lupus     #GERD (gastroesophageal reflux disease) - PPI     #Osteopenia - hold alendronate              Subjective:     Chief Complaint[de-identified] Chaparrita Ivory, pain in R wrist, elbow shoulder, limiting PT  ROS:  (bold if positive, if negative)    Tolerating PT  Tolerating Diet        Objective:     Last 24hrs VS reviewed since prior progress note.  Most recent are:    Visit Vitals  /79 (BP 1 Location: Left upper arm, BP Patient Position: At rest)   Pulse 84   Temp 97.9 °F (36.6 °C)   Resp 17   Ht 5' 7\" (1.702 m)   Wt 74.4 kg (164 lb 0.4 oz)   SpO2 98%   BMI 25.69 kg/m²     SpO2 Readings from Last 6 Encounters:   21 98%   21 98%   19 98%            Intake/Output Summary (Last 24 hours) at 2021 1534  Last data filed at 2021 0954  Gross per 24 hour   Intake 720 ml   Output 700 ml   Net 20 ml        Physical Exam:    Gen: Well-developed, well-nourished, in no acute distress  HEENT:  Pink conjunctivae, PERRL, hearing intact to voice, moist mucous membranes  Neck:  Supple, without masses, thyroid non-tender  Resp:  No accessory muscle use, clear breath sounds without wheezes rales or rhonchi  Card:  No murmurs, normal S1, S2 without thrills, bruits or peripheral edema  Abd:  Soft, non-tender, non-distended, normoactive bowel sounds are present, no palpable organomegaly and no detectable hernias  Lymph:  No cervical or inguinal adenopathy  Musc: CMC warmth and tenderness, mild MCP synovitis  Skin:  No rashes or ulcers, skin turgor is good  Neuro:  Cranial nerves are grossly intact, Rt side weakness strength 3/5 RUL and RLL  Psych:  Good insight, oriented to person, place and time, alert  __________________________________________________________________  Medications Reviewed: (see below)  Medications:     Current Facility-Administered Medications   Medication Dose Route Frequency    predniSONE (DELTASONE) tablet 40 mg  40 mg Oral DAILY WITH BREAKFAST    pantoprazole (PROTONIX) tablet 40 mg  40 mg Oral ACB    [Held by provider] predniSONE (DELTASONE) tablet 10 mg  10 mg Oral DAILY WITH BREAKFAST    pregabalin (LYRICA) capsule 150 mg  150 mg Oral BID    traMADoL (ULTRAM) tablet 50 mg  50 mg Oral Q6H PRN    traZODone (DESYREL) tablet 300 mg  300 mg Oral QHS    sodium chloride (NS) flush 5-40 mL  5-40 mL IntraVENous Q8H    sodium chloride (NS) flush 5-40 mL  5-40 mL IntraVENous PRN    acetaminophen (TYLENOL) tablet 650 mg  650 mg Oral Q6H PRN    Or    acetaminophen (TYLENOL) suppository 650 mg  650 mg Rectal Q6H PRN    polyethylene glycol (MIRALAX) packet 17 g  17 g Oral DAILY PRN    ondansetron (ZOFRAN ODT) tablet 4 mg  4 mg Oral Q8H PRN    Or    ondansetron (ZOFRAN) injection 4 mg  4 mg IntraVENous Q6H PRN    enoxaparin (LOVENOX) injection 40 mg  40 mg SubCUTAneous Q24H    atorvastatin (LIPITOR) tablet 80 mg  80 mg Oral DAILY    hydrOXYchloroQUINE (PLAQUENIL) tablet 200 mg  200 mg Oral BID    aspirin chewable tablet 81 mg  81 mg Oral DAILY    labetaloL (NORMODYNE;TRANDATE) 20 mg/4 mL (5 mg/mL) injection 20 mg  20 mg IntraVENous Q6H PRN    amLODIPine (NORVASC) tablet 10 mg  10 mg Oral DAILY        Lab Data Reviewed: (see below)  Lab Review:     Recent Labs     11/10/21  0153   WBC 2.7*   HGB 13.2   HCT 41.0        Recent Labs     11/11/21  0345 11/10/21  0153    134*   K 3.7 3.9    101   CO2 29 29   * 110*   BUN 27* 30*   CREA 0.85 1.19*   CA 8.3* 8.7   MG  --  2.2     No results found for: GLUCPOC  No results for input(s): PH, PCO2, PO2, HCO3, FIO2 in the last 72 hours. No results for input(s): INR, INREXT, INREXT in the last 72 hours. All Micro Results     Procedure Component Value Units Date/Time    URINE CULTURE HOLD SAMPLE [[id number]] Collected: 11/07/21 1430    Order Status: Completed Specimen: Urine from Serum Updated: 11/07/21 1435     Urine culture hold       Urine on hold in Microbiology dept for 2 days. If unpreserved urine is submitted, it cannot be used for addtional testing after 24 hours, recollection will be required. I have reviewed notes of prior 24hr. Other pertinent lab: Total time spent with patient: 28 I personally reviewed chart, notes, data and current medications in the medical record. I have personally examined and treated the patient at bedside during this period.                  Care Plan discussed with: Patient, Nursing Staff and >50% of time spent in counseling and coordination of care    Discussed:  Care Plan    Prophylaxis:  Lovenox    Disposition:  Home w/Family           ___________________________________________________    Attending Physician: Sy Victoria MD

## 2021-11-11 NOTE — PROGRESS NOTES
Physician Progress Note      Pierce Hester  [facility] #:                  [id number]  :                       1965  ADMIT DATE:       2021 12:38 PM  [address] [place] DATE:  RESPONDING  PROVIDER #:        Eduin Ford MD          QUERY TEXT:    Dr Salima Beltran:    Patient admitted with Acute CVA. Noted documentation of Acute Kidney Injury on 11/10 PN. In order to support the diagnosis of DELANEY, please include additional clinical indicators in your documentation. Or please document if the diagnosis of DELANEY has been ruled out after further study. The medical record reflects the following:  Risk Factors: 56yoF w/ elevated BP, HTN  Clinical Indicators:  Creat 0.84 BUN 14 (last baseline Creat 0.64 21  /106 - (!) 217/115 (!) 227/96  Treatment: IV Hydrlazine, hold lasix    Defined by Kidney Disease Improving Global Outcomes (KDIGO) clinical practice guideline for acute kidney injury:  -Increase in SCr by greater than or equal to 0.3 mg/dl within 48 hours; or  -Increase or decrease in SCr to greater than or equal to 1.5 times baseline, which is known or presumed to have occurred within the prior 7 days; or  -Urine volume < 0.5ml/kg/h for 6 hours  Options provided:  -- Acute kidney injury evidenced by, Please document evidence as well as baseline creatinine, if known.   -- Acute kidney injury ruled out after study  -- Other - I will add my own diagnosis  -- Disagree - Not applicable / Not valid  -- Disagree - Clinically unable to determine / Unknown  -- Refer to Clinical Documentation Reviewer    PROVIDER RESPONSE TEXT:    This patient has an acute kidney injury as evidenced by increas cr 0.3    Query created by: Navi Fuentes on 2021 12:57 PM      Electronically signed by:  Eduin Ford MD 2021 1:02 PM

## 2021-11-11 NOTE — PROGRESS NOTES
11/11/2021   CARE MANAGEMENT NOTE:  Pt transferred from [facility] to the 5th floor. EMR reviewed and a handoff was received from previous  Camilla Altman). Pt was admitted with acute CVA; also Lupus. Reportedly, pt resides with her adult dtr DtrMargo Martinez ([phone]) is a primary family contact. RUR 11%; LOS 3 days 19 hrs. Transition Plan of Care:  1. Neurology is following pt for medical management  2. [facility] - accepted however they notified CM this a.m. that they are out of network. Pt is agreeable for referrals to be sent to alternate IPRs. ([facility], [facility], [facility]). 3.  Insurance Una Hammer was initiated by [facility] on 11/11  4. Covid PCR was ordered on 11/11  5. Outpt f/u  6. Mode of transport to be determined    CM will continue to follow pt for IPR.   Lula

## 2021-11-11 NOTE — PROGRESS NOTES
1930  Bedside and Verbal shift change report given to [address] [place] [address] (oncoming nurse) by Alec Mcdermott RN (offgoing nurse). Report included the following information SBAR, Kardex, Intake/Output, MAR and Recent Results. [phone]  Patient requests Lyrica to be changed to TID. Stated that Dr. Jo Ruiz, her Neurologist, stated she can go to from 2x/day to 3x/ day. Dr. Caroline Bolanos notified. [phone]  Patient report no BM since she was admitted. No c/o pain. No N/V    Offered PRN Miralax, refused and stated it doesn't work for her. She is on Linzess. Dr. Caroline Bolanos notified.

## 2021-11-11 NOTE — PROGRESS NOTES
Bedside and Verbal shift change report given to [address] [place] [address] [address] (oncoming nurse) by Jair Dooley RN (offgoing nurse). Report included the following information SBAR, Kardex, Intake/Output, MAR and Recent Results.

## 2021-11-11 NOTE — PROGRESS NOTES
Bedside and Verbal shift change report given to DEVANTE Sarabia (oncoming nurse) by Donnie KRAUS RN (offgoing nurse).  Report included the following information SBAR, Kardex, Intake/Output, MAR and Recent Results.

## 2021-11-11 NOTE — PROGRESS NOTES
Problem: Self Care Deficits Care Plan (Adult)  Goal: *Acute Goals and Plan of Care (Insert Text)  Description: FUNCTIONAL STATUS PRIOR TO ADMISSION: Patient was independent and active without use of DME.    HOME SUPPORT PRIOR TO ADMISSION: Patient normally lives in [place] with daughter. Does not require an assistive device, Independent with all ADL's, and was driving. Currently here in [place] visiting sister and friend. She is staying with friend    Occupational Therapy Goals  Initiated 11/8/2021  1. Patient will perform grooming with supervision/setup using R, dominant, UE as fine manipulator within 7 day(s). 2.  Patient will perform upper body dressing and bathing with minimum assistance assistance within 7 day(s). 3.  Patient will perform lower body dressing and bathing with moderate assistance assistance within 7 day(s). 4.  Patient will perform toilet transfers with moderate assistance to [facility] using best technique within 7 day(s). 5   Patient will participate in upper extremity therapeutic exercise/activities with minimal assistance with focus on R, dominant, UE strength, coordination, and attention within 7 day(s). 6.  Patient will use R, dominant, UE 75% of the time as fine manipulator with no cuing to increase independence with all ADL tasks within 7 days. 7.  Patient will engage in full Fugl-Zepeda Assessment of Upper Extremity outcome measure within 7 days. Outcome: Progressing Towards Goal   OCCUPATIONAL THERAPY TREATMENT  Patient: Sylvia Bazan (5 y.o. female)  Date: 11/11/2021  Diagnosis: Right sided weakness [R53.1]  CVA (cerebral vascular accident) [facility]) [I63.9]   Right sided weakness       Precautions: Fall  Chart, occupational therapy assessment, plan of care, and goals were reviewed. ASSESSMENT  Patient continues with skilled OT services and is progressing towards goals.   Patient limited today by R wrist pain (ongoing pain due to lupus vs CVA), impaired standing tolerance, impaired standing balance, RUE/RLE weakness. Patient demonstrated good improvement in ADL related mobiltiy and transfers today, though still well below baseline. Continue to recommend intensive inpatient rehab services to maximize neuro recovery, ADL/IADL and mobility independence to safely return home    Current Level of Function Impacting Discharge (ADLs): mod to max assist for toileting, LB dressing, UB dressing. Mod A x 1 for mobility today    Other factors to consider for discharge: complex medical history including lupus         PLAN :  Patient continues to benefit from skilled intervention to address the above impairments. Continue treatment per established plan of care to address goals. Recommend with staff: in chair for meals    Recommend next OT session: progress POC-     Recommendation for discharge: (in order for the patient to meet his/her long term goals)  Therapy 3 hours per day 5-7 days per week    This discharge recommendation:  Has been made in collaboration with the attending provider and/or case management    IF patient discharges home will need the following DME: TBD       SUBJECTIVE:   Patient pleasant and cooperative     OBJECTIVE DATA SUMMARY:   Cognitive/Behavioral Status:  Neurologic State: Alert  Orientation Level: Oriented X4  Cognition: Appropriate decision making; Appropriate safety awareness             Functional Mobility and Transfers for ADLs:  Bed Mobility:       Transfers:  Sit to Stand: Minimum assistance; Moderate assistance; Assist x2  Functional Transfers  Bathroom Mobility: Moderate assistance- simulated with in room ambulation, RW level with Min to Mod A  Bed to Chair: Moderate assistance; Assist x1    Balance:  Standing: Impaired;  Without support  Standing - Static: Fair  Standing - Dynamic : Fair    ADL Intervention:     ADL related mobility training  including functional ambulation with trial of walker (initially 2 person assist for safety), sit to stand transfer training (min to Mod A), standing tolerance/static balance (limited by RLE weakness)              Therapeutic Exercises:   Deferred RUE exercise due to R wrist pain with flexion/extension. Patient questioning need for splint. Consulted team members who report ongoing pain, though able to use functionally. Pain:  R wrist pain     Activity Tolerance:   Fair    After treatment patient left in no apparent distress:   Sitting in chair and Call bell within reach    COMMUNICATION/COLLABORATION:   The patients plan of care was discussed with: Physical therapist and Registered nurse.      Melly Art OT  Time Calculation: 29 mins

## 2021-11-12 PROCEDURE — 74011250637 HC RX REV CODE- 250/637: Performed by: INTERNAL MEDICINE

## 2021-11-12 PROCEDURE — 97116 GAIT TRAINING THERAPY: CPT

## 2021-11-12 PROCEDURE — 74011000250 HC RX REV CODE- 250: Performed by: INTERNAL MEDICINE

## 2021-11-12 PROCEDURE — 74011636637 HC RX REV CODE- 636/637: Performed by: INTERNAL MEDICINE

## 2021-11-12 PROCEDURE — 65660000000 HC RM CCU STEPDOWN

## 2021-11-12 PROCEDURE — 97530 THERAPEUTIC ACTIVITIES: CPT

## 2021-11-12 PROCEDURE — 74011250636 HC RX REV CODE- 250/636: Performed by: INTERNAL MEDICINE

## 2021-11-12 RX ORDER — LIDOCAINE 40 MG/G
CREAM TOPICAL
Status: DISCONTINUED | OUTPATIENT
Start: 2021-11-12 | End: 2021-11-19 | Stop reason: HOSPADM

## 2021-11-12 RX ADMIN — ENOXAPARIN SODIUM 40 MG: 100 INJECTION SUBCUTANEOUS at 16:25

## 2021-11-12 RX ADMIN — ASPIRIN 81 MG: 81 TABLET, CHEWABLE ORAL at 08:58

## 2021-11-12 RX ADMIN — PREGABALIN 150 MG: 100 CAPSULE ORAL at 08:58

## 2021-11-12 RX ADMIN — Medication 10 ML: at 06:30

## 2021-11-12 RX ADMIN — HYDROXYCHLOROQUINE SULFATE 200 MG: 200 TABLET ORAL at 17:31

## 2021-11-12 RX ADMIN — PANTOPRAZOLE SODIUM 40 MG: 40 TABLET, DELAYED RELEASE ORAL at 06:30

## 2021-11-12 RX ADMIN — PREGABALIN 150 MG: 100 CAPSULE ORAL at 16:24

## 2021-11-12 RX ADMIN — ATORVASTATIN CALCIUM 80 MG: 20 TABLET, FILM COATED ORAL at 08:58

## 2021-11-12 RX ADMIN — PREDNISONE 40 MG: 20 TABLET ORAL at 08:58

## 2021-11-12 RX ADMIN — LIDOCAINE 4%: 4 CREAM TOPICAL at 16:24

## 2021-11-12 RX ADMIN — Medication 10 ML: at 14:00

## 2021-11-12 RX ADMIN — PREGABALIN 150 MG: 100 CAPSULE ORAL at 21:50

## 2021-11-12 RX ADMIN — HYDROXYCHLOROQUINE SULFATE 200 MG: 200 TABLET ORAL at 08:58

## 2021-11-12 RX ADMIN — AMLODIPINE BESYLATE 10 MG: 5 TABLET ORAL at 08:58

## 2021-11-12 RX ADMIN — TRAZODONE HYDROCHLORIDE 300 MG: 100 TABLET ORAL at 21:50

## 2021-11-12 RX ADMIN — Medication 10 ML: at 21:50

## 2021-11-12 NOTE — PROGRESS NOTES
Bedside and Verbal shift change report given to DEVANTE Green (oncoming nurse) by Flaca Paige (offgoing nurse). Report included the following information SBAR, Kardex, Intake/Output, MAR, Recent Results and Med Rec Status.

## 2021-11-12 NOTE — PROGRESS NOTES
Comprehensive Nutrition Assessment    Type and Reason for Visit: RD nutrition re-screen/LOS, Initial    Nutrition Recommendations/Plan:   1. Continue regular diet  2. Consider modification to bowel regimen - no BM x 5 days     Nutrition Assessment:    Pt is a 55year old female admitted with Right sided weakness [R53.1]  CVA (cerebral vascular accident) ([facility] [address] [I63.9]. She has a past medical history of Anemia, Arm vein blood clot, left, History of colon polyps, Lupus, Neutropenia, and Pulmonary hypertension. PO intake averaging 75% or more of all meals documented. Nausea today, but no vomiting or diarrhea. States has not had BM during hospitalization. States she usually takes \"something - maybe linzess? \" for this. No chewing/swallowing problems. States # with no recent weight or appetite changes. NKFA. Wt Readings from Last 10 Encounters:   11/09/21 74.4 kg (164 lb 0.4 oz)   04/18/21 73.5 kg (162 lb)   04/03/19 88 kg (194 lb)       Malnutrition Assessment:  Malnutrition Status:  No malnutrition    Context:  Acute illness     Findings of the 6 clinical characteristics of malnutrition:   Energy Intake:  No significant decrease in energy intake  Weight Loss:  No significant weight loss     Body Fat Loss:  No significant body fat loss,     Muscle Mass Loss:  No significant muscle mass loss,    Fluid Accumulation:  No significant fluid accumulation,     Strength:  Not performed     Estimated Daily Nutrient Needs:  Energy (kcal): 1783 (MSJ x 1.3); Weight Used for Energy Requirements: Current  Protein (g): 60-74 (0.8-1.0); Weight Used for Protein Requirements: Current  Fluid (ml/day): 1783; Method Used for Fluid Requirements: 1 ml/kcal    Nutrition Related Findings:    ABD: WNL 11/11  Last BM: None noted - PTA  Edema: None noted 11/12    Nutr.  Labs:  CRP 2.28 (H)  Lab Results   Component Value Date/Time    GFR est AA >60 11/11/2021 03:45 AM    GFR est non-AA >60 11/11/2021 03:45 AM    Creatinine 0.85 11/11/2021 03:45 AM    BUN 27 (H) 11/11/2021 03:45 AM    Sodium 136 11/11/2021 03:45 AM    Potassium 3.7 11/11/2021 03:45 AM    Chloride 105 11/11/2021 03:45 AM    CO2 29 11/11/2021 03:45 AM     Lab Results   Component Value Date/Time    Glucose 117 (H) 11/11/2021 03:45 AM     Lab Results   Component Value Date/Time    Hemoglobin A1c 5.6 11/08/2021 03:48 AM     Nutr. Meds:  Norvasc  Lipitor  Lovenox  Labetalol  Protonix  Miralax PRN  Deltasone    Wounds:    None       Current Nutrition Therapies:  ADULT DIET Regular; please send a boiled egg and applesauce for breakfasts    Anthropometric Measures:  · Height:  5' 7.01\" (170.2 cm)  · Current Body Wt:  74.4 kg (164 lb 0.4 oz)    · Ideal Body Wt:  135 lbs:  121.5 %   · Body mass index is 25.68 kg/m². · BMI Category: Overweight (BMI 25.0-29. 9)       Nutrition Diagnosis:   No nutrition diagnosis at this time     Nutrition Interventions:   Food and/or Nutrient Delivery: Continue current diet  Nutrition Education and Counseling: No recommendations at this time  Coordination of Nutrition Care: Continue to monitor while inpatient    Goals:  PO intake continues >/= 75% of all meals within 5 - 7 days       Nutrition Monitoring and Evaluation:   Behavioral-Environmental Outcomes: None identified  Food/Nutrient Intake Outcomes: Food and nutrient intake  Physical Signs/Symptoms Outcomes: Biochemical data, Weight    Discharge Planning:    No discharge needs at this time     Electronically signed by Aixa Douglas RD on 66/05/4928   Contact: [phone] or via [employer]

## 2021-11-12 NOTE — PROGRESS NOTES
Problem: Mobility Impaired (Adult and Pediatric)  Goal: *Acute Goals and Plan of Care (Insert Text)  Description: FUNCTIONAL STATUS PRIOR TO ADMISSION: Patient was independent and active without use of DME.    HOME SUPPORT PRIOR TO ADMISSION: Patient normally lives in [place] with daughter. Does not require an assistive device, Independent with all ADL's, and was driving. Currently here in [facility] visiting sister and friend. She is staying with friend    Physical Therapy Goals  Initiated 11/8/2021  1. Patient will move from supine to sit and sit to supine  in bed with minimal assistance/contact guard assist within 7 day(s). 2.  Patient will transfer from bed to chair and chair to bed with minimal assistance/contact guard assist using the least restrictive device within 7 day(s). 3.  Patient will perform sit to stand with minimal assistance/contact guard assist within 7 day(s). 4.  Patient will ambulate with minimal assistance/contact guard assist for 150 feet with the least restrictive device within 7 day(s). Note:   PHYSICAL THERAPY TREATMENT  Patient: Kiersten Tolentino (83 y.o. female)  Date: 11/12/2021  Diagnosis: Right sided weakness [R53.1]  CVA (cerebral vascular accident) [facility]) [I63.9]   Right sided weakness       Precautions: Fall  Chart, physical therapy assessment, plan of care and goals were reviewed. ASSESSMENT  Patient continues with skilled PT services. Pt supine to sit with CGA to min assist.Pt sit to stand with CGA to min assist.Pt ambulated 20ft to chair with RW  min to mod assist.Pt unsteady at times but right knee with no buckling. Pt left sitting. Pt progressing with increased gait stability. Continue goals. PLAN :  Patient continues to benefit from skilled intervention to address the above impairments. Continue treatment per established plan of care. to address goals.     Recommendation for discharge: (in order for the patient to meet his/her long term goals)  Therapy 3 hours per day 5-7 days per week    This discharge recommendation:  Has been made in collaboration with the attending provider and/or case management    IF patient discharges home will need the following DME: rolling walker       SUBJECTIVE:       OBJECTIVE DATA SUMMARY:   Critical Behavior:  Neurologic State: Alert  Orientation Level: Oriented X4  Cognition: Follows commands  Safety/Judgement: Awareness of environment  Functional Mobility Training:  Bed Mobility:     Supine to Sit: Contact guard assistance; Minimum assistance              Transfers:  Sit to Stand: Contact guard assistance; Minimum assistance  Stand to Sit: Contact guard assistance                             Balance:  Sitting: Intact  Sitting - Static: Fair (occasional)  Standing: With support  Standing - Static: Fair  Ambulation/Gait Training:  Distance (ft): 20 Feet (ft)  Assistive Device: Gait belt; Walker, rolling  Ambulation - Level of Assistance:  Moderate assistance; Minimal assistance; Assist x1        Gait Abnormalities: Decreased step clearance        Base of Support: Narrowed     Speed/Zhane: Pace decreased (<100 feet/min)  Step Length: Right shortened; Left shortened               Activity Tolerance:   Good    After treatment patient left in no apparent distress:   Sitting in chair    COMMUNICATION/COLLABORATION:   The patients plan of care was discussed with: Physical therapist.     Krystal Bell PTA   Time Calculation: 23 mins

## 2021-11-12 NOTE — PROGRESS NOTES
CORINNE:   1) Rehab vs. Home with [facility] and 24/7 care   2) Insurance auth pending   3) Pt will need 2ND IM letter at time of discharge   4) Risk of readmission- 11% LOW     Hospital Day 5:   4:42 PM- CM reached out to Gwendolynrock Sandy with [facility]- no answer. Will continue to follow and assist as needed. 10:30 AM- [facility] and [facility]- CM notified pt, pt agreeable and very thankful. CM will continue to follow and assist as needed. 9:20 AM- CM notified by 1 Moi Napier pt is out of network and pt does not have [facility] benefits. CM spoke with pt regarding this- pt states seh spoke with her insurance and they said they would approve if the facility restarted the auth and pushed back explaining the circumstances. Pt agreeable for CM to send referrals to additional rehabs.      Fareed Rodriguez, MSW, [id number] [facility]

## 2021-11-12 NOTE — PROGRESS NOTES
Luis Fernando Villarreal [facility] [address]  [address], [place], [address] [facility]  [phone]      Medical Progress Note      NAME: Pablo Crowe   :  1965  MRM:  [id number]    Date of service: 2021        Assessment and Plan:   #Acute CVA/ Right sided weakness -  MRI brain: Acute small left posterior internal capsule/corona radiata lacunar infarct. Continue ASA. Fall precautions and PT/OT eval.  Increased atorvastatin to 80. echo is unremarkable. Hold trazodone and lorazepam. Awaiting placement       #DELANEY: Holding lasix as appears prerenal; improving    #R wrist, shoulder, elbow pain: See has CMC warmth as well as swan-necking so query RA overlap. Did check inflammatory markers, which are fairly elevated despite being on chronic pred 10mg   - Burst pred 40mg , symptomatically much improved today   - Check RF, CCP, as well C3, C4 levels    #Lupus erythematosus - On HCQ, but eval as above     #Pulmonary hypertension? / Bilateral lower extremity edema - Echo not c/w this and I'm not sure where the diagnosis comes from. Hold lasix as she is still dry     #Neutropenia, chronic. Suspect this is due to Lupus     #GERD (gastroesophageal reflux disease) - PPI     #Osteopenia - hold alendronate              Subjective:     Chief Complaint[de-identified] Elby Senoia, pain in R wrist, elbow shoulder significantly improved    ROS:  (bold if positive, if negative)    Tolerating PT  Tolerating Diet        Objective:     Last 24hrs VS reviewed since prior progress note.  Most recent are:    Visit Vitals  /83 (BP 1 Location: Right upper arm, BP Patient Position: At rest)   Pulse 78   Temp 97.9 °F (36.6 °C)   Resp 16   Ht 5' 7.01\" (1.702 m)   Wt 74.4 kg (164 lb 0.4 oz)   SpO2 97%   BMI 25.68 kg/m²     SpO2 Readings from Last 6 Encounters:   21 97%   21 98%   19 98%            Intake/Output Summary (Last 24 hours) at 2021 1419  Last data filed at 2021 0648  Gross per 24 hour   Intake 990 ml Output 500 ml   Net 490 ml        Physical Exam:    Gen:  Well-developed, well-nourished, in no acute distress  HEENT:  Pink conjunctivae, PERRL, hearing intact to voice, moist mucous membranes  Neck:  Supple, without masses, thyroid non-tender  Resp:  No accessory muscle use, clear breath sounds without wheezes rales or rhonchi  Card:  No murmurs, normal S1, S2 without thrills, bruits or peripheral edema  Abd:  Soft, non-tender, non-distended, normoactive bowel sounds are present, no palpable organomegaly and no detectable hernias  Lymph:  No cervical or inguinal adenopathy  Musc: CMC warmth and tenderness improved, mild MCP synovitis improved  Skin:  No rashes or ulcers, skin turgor is good  Neuro:  Cranial nerves are grossly intact, Rt side weakness strength 3/5 RUL and RLL  Psych:  Good insight, oriented to person, place and time, alert  __________________________________________________________________  Medications Reviewed: (see below)  Medications:     Current Facility-Administered Medications   Medication Dose Route Frequency    pregabalin (LYRICA) capsule 150 mg  150 mg Oral TID    lidocaine (XYLOCAINE) 4 % cream   Topical TID PRN    predniSONE (DELTASONE) tablet 40 mg  40 mg Oral DAILY WITH BREAKFAST    pantoprazole (PROTONIX) tablet 40 mg  40 mg Oral ACB    [Held by provider] predniSONE (DELTASONE) tablet 10 mg  10 mg Oral DAILY WITH BREAKFAST    traMADoL (ULTRAM) tablet 50 mg  50 mg Oral Q6H PRN    traZODone (DESYREL) tablet 300 mg  300 mg Oral QHS    sodium chloride (NS) flush 5-40 mL  5-40 mL IntraVENous Q8H    sodium chloride (NS) flush 5-40 mL  5-40 mL IntraVENous PRN    acetaminophen (TYLENOL) tablet 650 mg  650 mg Oral Q6H PRN    Or    acetaminophen (TYLENOL) suppository 650 mg  650 mg Rectal Q6H PRN    polyethylene glycol (MIRALAX) packet 17 g  17 g Oral DAILY PRN    ondansetron (ZOFRAN ODT) tablet 4 mg  4 mg Oral Q8H PRN    Or    ondansetron (ZOFRAN) injection 4 mg  4 mg IntraVENous Q6H PRN    enoxaparin (LOVENOX) injection 40 mg  40 mg SubCUTAneous Q24H    atorvastatin (LIPITOR) tablet 80 mg  80 mg Oral DAILY    hydrOXYchloroQUINE (PLAQUENIL) tablet 200 mg  200 mg Oral BID    aspirin chewable tablet 81 mg  81 mg Oral DAILY    labetaloL (NORMODYNE;TRANDATE) 20 mg/4 mL (5 mg/mL) injection 20 mg  20 mg IntraVENous Q6H PRN    amLODIPine (NORVASC) tablet 10 mg  10 mg Oral DAILY        Lab Data Reviewed: (see below)  Lab Review:     Recent Labs     11/10/21  0153   WBC 2.7*   HGB 13.2   HCT 41.0        Recent Labs     11/11/21  0345 11/10/21  0153    134*   K 3.7 3.9    101   CO2 29 29   * 110*   BUN 27* 30*   CREA 0.85 1.19*   CA 8.3* 8.7   MG  --  2.2     No results found for: GLUCPOC  No results for input(s): PH, PCO2, PO2, HCO3, FIO2 in the last 72 hours. No results for input(s): INR, INREXT, INREXT in the last 72 hours. All Micro Results     Procedure Component Value Units Date/Time    URINE CULTURE HOLD SAMPLE [[id number]] Collected: 11/07/21 1430    Order Status: Completed Specimen: Urine from Serum Updated: 11/07/21 1435     Urine culture hold       Urine on hold in Microbiology dept for 2 days. If unpreserved urine is submitted, it cannot be used for addtional testing after 24 hours, recollection will be required. I have reviewed notes of prior 24hr. Other pertinent lab: Total time spent with patient: 28 I personally reviewed chart, notes, data and current medications in the medical record. I have personally examined and treated the patient at bedside during this period.                  Care Plan discussed with: Patient, Nursing Staff and >50% of time spent in counseling and coordination of care    Discussed:  Care Plan    Prophylaxis:  Lovenox    Disposition:  Home w/Family           ___________________________________________________    Attending Physician: Miriam Negron MD

## 2021-11-13 LAB
ANION GAP SERPL CALC-SCNC: 2 MMOL/L (ref 5–15)
BUN SERPL-MCNC: 19 MG/DL (ref 6–20)
BUN/CREAT SERPL: 22 (ref 12–20)
C3 SERPL-MCNC: 96 MG/DL (ref 82–167)
C4 SERPL-MCNC: 8 MG/DL (ref 12–38)
CALCIUM SERPL-MCNC: 8.9 MG/DL (ref 8.5–10.1)
CHLORIDE SERPL-SCNC: 104 MMOL/L (ref 97–108)
CO2 SERPL-SCNC: 29 MMOL/L (ref 21–32)
CREAT SERPL-MCNC: 0.86 MG/DL (ref 0.55–1.02)
GLUCOSE SERPL-MCNC: 120 MG/DL (ref 65–100)
POTASSIUM SERPL-SCNC: 4.2 MMOL/L (ref 3.5–5.1)
SODIUM SERPL-SCNC: 135 MMOL/L (ref 136–145)

## 2021-11-13 PROCEDURE — 36415 COLL VENOUS BLD VENIPUNCTURE: CPT

## 2021-11-13 PROCEDURE — 74011250637 HC RX REV CODE- 250/637: Performed by: INTERNAL MEDICINE

## 2021-11-13 PROCEDURE — 74011636637 HC RX REV CODE- 636/637: Performed by: INTERNAL MEDICINE

## 2021-11-13 PROCEDURE — 97535 SELF CARE MNGMENT TRAINING: CPT

## 2021-11-13 PROCEDURE — 80048 BASIC METABOLIC PNL TOTAL CA: CPT

## 2021-11-13 PROCEDURE — 74011250636 HC RX REV CODE- 250/636: Performed by: INTERNAL MEDICINE

## 2021-11-13 PROCEDURE — 65660000000 HC RM CCU STEPDOWN

## 2021-11-13 RX ADMIN — ENOXAPARIN SODIUM 40 MG: 100 INJECTION SUBCUTANEOUS at 14:48

## 2021-11-13 RX ADMIN — ATORVASTATIN CALCIUM 80 MG: 20 TABLET, FILM COATED ORAL at 08:31

## 2021-11-13 RX ADMIN — TRAZODONE HYDROCHLORIDE 300 MG: 100 TABLET ORAL at 22:25

## 2021-11-13 RX ADMIN — Medication 10 ML: at 05:49

## 2021-11-13 RX ADMIN — HYDROXYCHLOROQUINE SULFATE 200 MG: 200 TABLET ORAL at 08:31

## 2021-11-13 RX ADMIN — Medication 10 ML: at 22:25

## 2021-11-13 RX ADMIN — PREDNISONE 40 MG: 20 TABLET ORAL at 08:31

## 2021-11-13 RX ADMIN — PREGABALIN 150 MG: 100 CAPSULE ORAL at 08:31

## 2021-11-13 RX ADMIN — Medication 10 ML: at 14:49

## 2021-11-13 RX ADMIN — AMLODIPINE BESYLATE 10 MG: 5 TABLET ORAL at 08:31

## 2021-11-13 RX ADMIN — PREGABALIN 150 MG: 100 CAPSULE ORAL at 22:25

## 2021-11-13 RX ADMIN — PREGABALIN 150 MG: 100 CAPSULE ORAL at 17:10

## 2021-11-13 RX ADMIN — PANTOPRAZOLE SODIUM 40 MG: 40 TABLET, DELAYED RELEASE ORAL at 05:48

## 2021-11-13 RX ADMIN — HYDROXYCHLOROQUINE SULFATE 200 MG: 200 TABLET ORAL at 17:10

## 2021-11-13 RX ADMIN — ASPIRIN 81 MG: 81 TABLET, CHEWABLE ORAL at 08:32

## 2021-11-13 NOTE — PROGRESS NOTES
Luis Fernando Villarreal [facility] [address]  [address] [facility], [place], [id number] [place] [address]  [phone]      Medical Progress Note      NAME: Paola Charles   :  1965  MRM:  [id number]    Date of service: 2021        Assessment and Plan:   #Acute CVA/ Right sided weakness -  MRI brain: Acute small left posterior internal capsule/corona radiata lacunar infarct. Continue ASA. Fall precautions and PT/OT eval.  Increased atorvastatin to 80. echo is unremarkable. Hold trazodone and lorazepam. Awaiting placement       #DELANEY: Holding lasix as appears prerenal; resolved; not sure she needs diuretic     #Inflammatory Arthritis\" R wrist, shoulder, elbow pain. CMC warmth as well as swan-necking so query RA overlap. Did check inflammatory markers, which are fairly elevated despite being on chronic pred 10mg. C3 normal, C4 down; RA and CCP pending   - Burst pred 40mg , symptomatically much improved    - RF, CCP pending    #Lupus erythematosus - On HCQ, but eval as above     #Pulmonary hypertension? / Bilateral lower extremity edema - Echo not c/w this and I'm not sure where the diagnosis comes from. Hold lasix as she is still dry     #Neutropenia, chronic. Suspect this is due to Lupus     #GERD (gastroesophageal reflux disease) - PPI     #Osteopenia - hold alendronate              Subjective:     Chief Complaint[de-identified] McEwen Mangle, feels much better overall today; participating with PT    ROS:  (bold if positive, if negative)    Tolerating PT  Tolerating Diet        Objective:     Last 24hrs VS reviewed since prior progress note.  Most recent are:    Visit Vitals  /74 (BP 1 Location: Right upper arm, BP Patient Position: At rest)   Pulse 73   Temp 98.1 °F (36.7 °C)   Resp 18   Ht 5' 7.01\" (1.702 m)   Wt 74.4 kg (164 lb 0.4 oz)   SpO2 97%   BMI 25.68 kg/m²     SpO2 Readings from Last 6 Encounters:   21 97%   21 98%   19 98%            Intake/Output Summary (Last 24 hours) at 2021 1359  Last data filed at 11/13/2021 1339  Gross per 24 hour   Intake 402 ml   Output --   Net 402 ml        Physical Exam:    Gen:  Well-developed, well-nourished, in no acute distress  HEENT:  Pink conjunctivae, PERRL, hearing intact to voice, moist mucous membranes  Neck:  Supple, without masses, thyroid non-tender  Resp:  No accessory muscle use, clear breath sounds without wheezes rales or rhonchi  Card:  No murmurs, normal S1, S2 without thrills, bruits or peripheral edema  Abd:  Soft, non-tender, non-distended, normoactive bowel sounds are present, no palpable organomegaly and no detectable hernias  Lymph:  No cervical or inguinal adenopathy  Musc: CMC warmth and tenderness improved, mild MCP synovitis improved  Skin:  No rashes or ulcers, skin turgor is good  Neuro:  Cranial nerves are grossly intact, Rt side weakness strength 3/5 RUL and RLL  Psych:  Good insight, oriented to person, place and time, alert  __________________________________________________________________  Medications Reviewed: (see below)  Medications:     Current Facility-Administered Medications   Medication Dose Route Frequency    linaCLOtide (LINZESS) caspule 72 mcg (Patient Supplied)  72 mcg Oral ACB    pregabalin (LYRICA) capsule 150 mg  150 mg Oral TID    lidocaine (XYLOCAINE) 4 % cream   Topical TID PRN    predniSONE (DELTASONE) tablet 40 mg  40 mg Oral DAILY WITH BREAKFAST    pantoprazole (PROTONIX) tablet 40 mg  40 mg Oral ACB    [Held by provider] predniSONE (DELTASONE) tablet 10 mg  10 mg Oral DAILY WITH BREAKFAST    traMADoL (ULTRAM) tablet 50 mg  50 mg Oral Q6H PRN    traZODone (DESYREL) tablet 300 mg  300 mg Oral QHS    sodium chloride (NS) flush 5-40 mL  5-40 mL IntraVENous Q8H    sodium chloride (NS) flush 5-40 mL  5-40 mL IntraVENous PRN    acetaminophen (TYLENOL) tablet 650 mg  650 mg Oral Q6H PRN    Or    acetaminophen (TYLENOL) suppository 650 mg  650 mg Rectal Q6H PRN    polyethylene glycol (MIRALAX) packet 17 g  17 g Oral DAILY PRN    ondansetron (ZOFRAN ODT) tablet 4 mg  4 mg Oral Q8H PRN    Or    ondansetron (ZOFRAN) injection 4 mg  4 mg IntraVENous Q6H PRN    enoxaparin (LOVENOX) injection 40 mg  40 mg SubCUTAneous Q24H    atorvastatin (LIPITOR) tablet 80 mg  80 mg Oral DAILY    hydrOXYchloroQUINE (PLAQUENIL) tablet 200 mg  200 mg Oral BID    aspirin chewable tablet 81 mg  81 mg Oral DAILY    labetaloL (NORMODYNE;TRANDATE) 20 mg/4 mL (5 mg/mL) injection 20 mg  20 mg IntraVENous Q6H PRN    amLODIPine (NORVASC) tablet 10 mg  10 mg Oral DAILY        Lab Data Reviewed: (see below)  Lab Review:     No results for input(s): WBC, HGB, HCT, PLT, HGBEXT, HCTEXT, PLTEXT, HGBEXT, HCTEXT, PLTEXT in the last 72 hours. Recent Labs     11/13/21  0255 11/11/21  0345   * 136   K 4.2 3.7    105   CO2 29 29   * 117*   BUN 19 27*   CREA 0.86 0.85   CA 8.9 8.3*     No results found for: GLUCPOC  No results for input(s): PH, PCO2, PO2, HCO3, FIO2 in the last 72 hours. No results for input(s): INR, INREXT, INREXT in the last 72 hours. All Micro Results     Procedure Component Value Units Date/Time    URINE CULTURE HOLD SAMPLE [[id number]] Collected: 11/07/21 1430    Order Status: Completed Specimen: Urine from Serum Updated: 11/07/21 1435     Urine culture hold       Urine on hold in Microbiology dept for 2 days. If unpreserved urine is submitted, it cannot be used for addtional testing after 24 hours, recollection will be required. I have reviewed notes of prior 24hr. Other pertinent lab: Total time spent with patient: 28 I personally reviewed chart, notes, data and current medications in the medical record. I have personally examined and treated the patient at bedside during this period.                  Care Plan discussed with: Patient, Nursing Staff and >50% of time spent in counseling and coordination of care    Discussed:  Care Plan    Prophylaxis:  Lovenox    Disposition:  Home w/Family           ___________________________________________________    Attending Physician: Denys Márquez MD

## 2021-11-13 NOTE — PROGRESS NOTES
CORINNE:   1) Rehab- auth pending with Angella Beltre (started Friday 11/12)   2) Pt will need updated COVID-19 test prior to d/c- CM requested   3) Pt will need 2ND IM letter at time of discharge   4) Pt may need transportation assistance at time of discharge   5) Risk of readmission- 10% LOW     Hospital Day 6:   2:00 PM- Pt remains on Med. Surg- pending auth for placement. CM spoke with pt and pt's dtr- in the event that pt's insurance will not approve then pt's dtr wishes for pt to go to rehab in [place], pt's dtr states she will get pt on a direct flight if that is the case. Pt and pt's dtr spoke with the [address] is willing to do a peer to peer if it is denied for some reason- pt and pt's dtr do not have the phone number for the peer to peer. Floor CM will follow up on Monday and continue to follow and assist as needed.      Debora Fitch, MSW, [address]

## 2021-11-13 NOTE — PROGRESS NOTES
Problem: Self Care Deficits Care Plan (Adult)  Goal: *Acute Goals and Plan of Care (Insert Text)  Description: FUNCTIONAL STATUS PRIOR TO ADMISSION: Patient was independent and active without use of DME.    HOME SUPPORT PRIOR TO ADMISSION: Patient normally lives in [place] with daughter. Does not require an assistive device, Independent with all ADL's, and was driving. Currently here in [place] visiting sister and friend. She is staying with friend    Occupational Therapy Goals  Initiated 11/8/2021  1. Patient will perform grooming with supervision/setup using R, dominant, UE as fine manipulator within 7 day(s). 2.  Patient will perform upper body dressing and bathing with minimum assistance assistance within 7 day(s). 3.  Patient will perform lower body dressing and bathing with moderate assistance assistance within 7 day(s). 4.  Patient will perform toilet transfers with moderate assistance to [facility] using best technique within 7 day(s). 5   Patient will participate in upper extremity therapeutic exercise/activities with minimal assistance with focus on R, dominant, UE strength, coordination, and attention within 7 day(s). 6.  Patient will use R, dominant, UE 75% of the time as fine manipulator with no cuing to increase independence with all ADL tasks within 7 days. 7.  Patient will engage in full Fugl-Zepeda Assessment of Upper Extremity outcome measure within 7 days. Outcome: Progressing Towards Goal   OCCUPATIONAL THERAPY TREATMENT  Patient: Sylvia Bazan (51 y.o. female)  Date: 11/13/2021  Diagnosis: Right sided weakness [R53.1]  CVA (cerebral vascular accident) [facility]) [I63.9]   Right sided weakness       Precautions: Fall  Chart, occupational therapy assessment, plan of care, and goals were reviewed. ASSESSMENT  Patient continues with skilled OT services and is  progressing towards goals. Pt ambulated to restroom, stood to wash her hands, face with contact guard.  Pt using her right hand to use soap dispenser and to brush her teeth. She transfers to commode with contact guard and completed her own hygiene. Pt encouraged to use R UE as much as possible for ADL tasks. Pt left seated in chair with daughter present. Current Level of Function Impacting Discharge (ADLs): contact guard standing at sink for grooming, contact guard toileting    Other factors to consider for discharge:          PLAN :  Patient continues to benefit from skilled intervention to address the above impairments. Continue treatment per established plan of care to address goals. Recommend with staff: out of bed to chair for ADl's, there ex, there act, meals    Recommend next OT session: cont towards goals    Recommendation for discharge: (in order for the patient to meet his/her long term goals)  Therapy 3 hours per day 5-7 days per week    This discharge recommendation:  Has not yet been discussed the attending provider and/or case management    IF patient discharges home will need the following DME:        SUBJECTIVE:   Patient stated I do need to use the restroom    OBJECTIVE DATA SUMMARY:   Cognitive/Behavioral Status:  Neurologic State: Alert;  Appropriate for age  Orientation Level: Oriented X4  Cognition: Follows commands             Functional Mobility and Transfers for ADLs:  Bed Mobility:   Supervision supine to sit    Transfers:  Sit to Stand: Contact guard assistance  Functional Transfers  Toilet Transfer : Contact guard assistance       Balance:  Sitting: Intact  Standing: Impaired    ADL Intervention:       Grooming  Grooming Assistance: Contact guard assistance  Position Performed: Standing  Washing Face: Contact guard assistance  Washing Hands: Contact guard assistance                        Toileting  Toileting Assistance: Contact guard assistance  Bladder Hygiene: Modified independent       Activity Tolerance:   Good    After treatment patient left in no apparent distress:   Sitting in chair and Call bell within reach    COMMUNICATION/COLLABORATION:   The patients plan of care was discussed with: Occupational therapist and Registered nurse.      ALIZE Duran/L  Time Calculation: 23 mins

## 2021-11-13 NOTE — PROGRESS NOTES
Bedside and Verbal shift change report given to [address] [place] [address] (oncoming nurse) by Ruben Guido (offgoing nurse). Report included the following information SBAR, Kardex, ED Summary, Intake/Output, MAR, Accordion and Recent Results.

## 2021-11-13 NOTE — PROGRESS NOTES
Problem: Pressure Injury - Risk of  Goal: *Prevention of pressure injury  Description: Document Jonah Scale and appropriate interventions in the flowsheet.   11/13/2021 0409 by Dakota Herrera RN  Outcome: Progressing Towards Goal  Note: Pressure Injury Interventions:  Sensory Interventions: Assess changes in LOC, Assess need for specialty bed, Minimize linen layers, Pressure redistribution bed/mattress (bed type)    Moisture Interventions: Absorbent underpads, Apply protective barrier, creams and emollients, Assess need for specialty bed, Limit adult briefs, Minimize layers    Activity Interventions: Assess need for specialty bed, Pressure redistribution bed/mattress(bed type)    Mobility Interventions: HOB 30 degrees or less, Pressure redistribution bed/mattress (bed type)    Nutrition Interventions: Document food/fluid/supplement intake    Friction and Shear Interventions: Apply protective barrier, creams and emollients, HOB 30 degrees or less, Minimize layers             11/13/2021 0409 by Dakota Herrera RN  Outcome: Progressing Towards Goal  Note: Pressure Injury Interventions:  Sensory Interventions: Assess changes in LOC, Assess need for specialty bed, Minimize linen layers, Pressure redistribution bed/mattress (bed type)    Moisture Interventions: Absorbent underpads, Apply protective barrier, creams and emollients, Assess need for specialty bed, Limit adult briefs, Minimize layers    Activity Interventions: Assess need for specialty bed, Pressure redistribution bed/mattress(bed type)    Mobility Interventions: HOB 30 degrees or less, Pressure redistribution bed/mattress (bed type)    Nutrition Interventions: Document food/fluid/supplement intake    Friction and Shear Interventions: Apply protective barrier, creams and emollients, HOB 30 degrees or less, Minimize layers                Problem: Falls - Risk of  Goal: *Absence of Falls  Description: Document Lester Fall Risk and appropriate interventions in the flowsheet.   11/13/2021 0409 by Meron Jules RN  Outcome: Progressing Towards Goal  Note: Fall Risk Interventions:  Mobility Interventions: Bed/chair exit alarm, Communicate number of staff needed for ambulation/transfer, Patient to call before getting OOB, Utilize walker, cane, or other assistive device         Medication Interventions: Bed/chair exit alarm, Patient to call before getting OOB, Teach patient to arise slowly    Elimination Interventions: Bed/chair exit alarm, Call light in reach, Patient to call for help with toileting needs, Toileting schedule/hourly rounds           11/13/2021 0409 by Meron Jules RN  Outcome: Progressing Towards Goal  Note: Fall Risk Interventions:  Mobility Interventions: Bed/chair exit alarm, Communicate number of staff needed for ambulation/transfer, Patient to call before getting OOB, Utilize walker, cane, or other assistive device         Medication Interventions: Bed/chair exit alarm, Patient to call before getting OOB, Teach patient to arise slowly    Elimination Interventions: Bed/chair exit alarm, Call light in reach, Patient to call for help with toileting needs, Toileting schedule/hourly rounds

## 2021-11-14 LAB
CCP IGA+IGG SERPL IA-ACNC: 16 UNITS (ref 0–19)
COVID-19 RAPID TEST, COVR: NOT DETECTED
ERYTHROCYTE [DISTWIDTH] IN BLOOD BY AUTOMATED COUNT: 12.2 % (ref 11.5–14.5)
HCT VFR BLD AUTO: 37.9 % (ref 35–47)
HGB BLD-MCNC: 12 G/DL (ref 11.5–16)
MCH RBC QN AUTO: 29.9 PG (ref 26–34)
MCHC RBC AUTO-ENTMCNC: 31.7 G/DL (ref 30–36.5)
MCV RBC AUTO: 94.5 FL (ref 80–99)
NRBC # BLD: 0 K/UL (ref 0–0.01)
NRBC BLD-RTO: 0 PER 100 WBC
PLATELET # BLD AUTO: 265 K/UL (ref 150–400)
PMV BLD AUTO: 10.4 FL (ref 8.9–12.9)
RBC # BLD AUTO: 4.01 M/UL (ref 3.8–5.2)
SOURCE, COVRS: NORMAL
WBC # BLD AUTO: 3.7 K/UL (ref 3.6–11)

## 2021-11-14 PROCEDURE — 87635 SARS-COV-2 COVID-19 AMP PRB: CPT

## 2021-11-14 PROCEDURE — 36415 COLL VENOUS BLD VENIPUNCTURE: CPT

## 2021-11-14 PROCEDURE — 74011250637 HC RX REV CODE- 250/637: Performed by: INTERNAL MEDICINE

## 2021-11-14 PROCEDURE — 65660000000 HC RM CCU STEPDOWN

## 2021-11-14 PROCEDURE — 74011636637 HC RX REV CODE- 636/637: Performed by: INTERNAL MEDICINE

## 2021-11-14 PROCEDURE — 85027 COMPLETE CBC AUTOMATED: CPT

## 2021-11-14 PROCEDURE — 74011250636 HC RX REV CODE- 250/636: Performed by: INTERNAL MEDICINE

## 2021-11-14 RX ADMIN — Medication 10 ML: at 20:12

## 2021-11-14 RX ADMIN — PREGABALIN 150 MG: 100 CAPSULE ORAL at 15:45

## 2021-11-14 RX ADMIN — PREDNISONE 40 MG: 20 TABLET ORAL at 09:03

## 2021-11-14 RX ADMIN — PREGABALIN 150 MG: 100 CAPSULE ORAL at 20:12

## 2021-11-14 RX ADMIN — ENOXAPARIN SODIUM 40 MG: 100 INJECTION SUBCUTANEOUS at 15:42

## 2021-11-14 RX ADMIN — Medication 10 ML: at 06:29

## 2021-11-14 RX ADMIN — Medication 10 ML: at 15:42

## 2021-11-14 RX ADMIN — HYDROXYCHLOROQUINE SULFATE 200 MG: 200 TABLET ORAL at 18:07

## 2021-11-14 RX ADMIN — TRAZODONE HYDROCHLORIDE 300 MG: 100 TABLET ORAL at 22:03

## 2021-11-14 RX ADMIN — PANTOPRAZOLE SODIUM 40 MG: 40 TABLET, DELAYED RELEASE ORAL at 06:28

## 2021-11-14 RX ADMIN — ATORVASTATIN CALCIUM 80 MG: 20 TABLET, FILM COATED ORAL at 09:03

## 2021-11-14 RX ADMIN — AMLODIPINE BESYLATE 10 MG: 5 TABLET ORAL at 09:03

## 2021-11-14 RX ADMIN — ASPIRIN 81 MG: 81 TABLET, CHEWABLE ORAL at 09:03

## 2021-11-14 RX ADMIN — PREGABALIN 150 MG: 100 CAPSULE ORAL at 09:03

## 2021-11-14 RX ADMIN — HYDROXYCHLOROQUINE SULFATE 200 MG: 200 TABLET ORAL at 09:03

## 2021-11-14 NOTE — PROGRESS NOTES
[facility]  [address] [facility], [address] [place] [address]  [phone]      Medical Progress Note      NAME: Mara Bailey   :  1965  MRM:  [id number]    Date of service: 2021        Assessment and Plan:   #Acute CVA/ Right sided weakness -  MRI brain: Acute small left posterior internal capsule/corona radiata lacunar infarct.  echo is unremarkable. Continues to make improvements while awaiting placement ([place])   - ASA, increased atorva to 80     #DELANEY: 2/2 lasix, volume down. She had been on this for pHTN, but reports that [address] several months ago had improved to normal, so she likely doesn't need this. Otherwise, no CHF, liver disease, proteinuria. Resolved. #Inflammatory Arthritis: R wrist, shoulder, elbow pain. CMC warmth as well as swan-necking so query RA overlap. Did check inflammatory markers, which are fairly elevated despite being on chronic pred 10mg. C3 normal, C4 down; RA and CCP pending   - Burst pred 40mg , symptomatically much improved; anticipate taper back to 10mg   - RF, CCP pending; if neg would call this lupus arthritis flare given drop in C4    #Lupus erythematosus - On HCQ, but eval as above     #Pulmonary hypertension: had been on several medications for this in the past and was previously oxygen dependent. Recent RHC improved to normal though in rare instance of resolution.      #Neutropenia, chronic. Suspect this is due to Lupus, improved with roids     #GERD (gastroesophageal reflux disease) - PPI     #Osteopenia - hold alendronate              Subjective:     Chief Complaint[de-identified] Roberth Houser, feels better each day. Hesitant to try to get on plane and fly back to [place], and would prefer to find rehab placement here if able    ROS:  (bold if positive, if negative)    Tolerating PT  Tolerating Diet        Objective:     Last 24hrs VS reviewed since prior progress note.  Most recent are:    Visit Vitals  /74 (BP 1 Location: Right upper arm, BP Patient Position: At rest)   Pulse 72   Temp 98.4 °F (36.9 °C)   Resp 16   Ht 5' 7.01\" (1.702 m)   Wt 74.4 kg (164 lb 0.4 oz)   SpO2 97%   BMI 25.68 kg/m²     SpO2 Readings from Last 6 Encounters:   11/14/21 97%   04/22/21 98%   04/03/19 98%            Intake/Output Summary (Last 24 hours) at 11/14/2021 1044  Last data filed at 11/14/2021 0600  Gross per 24 hour   Intake 322 ml   Output 0 ml   Net 322 ml        Physical Exam:    Gen:  Well-developed, well-nourished, in no acute distress  HEENT:  Pink conjunctivae, PERRL, hearing intact to voice, moist mucous membranes  Neck:  Supple, without masses, thyroid non-tender  Resp:  No accessory muscle use, clear breath sounds without wheezes rales or rhonchi  Card:  No murmurs, normal S1, S2 without thrills, bruits or peripheral edema  Abd:  Soft, non-tender, non-distended, normoactive bowel sounds are present, no palpable organomegaly and no detectable hernias  Lymph:  No cervical or inguinal adenopathy  Musc: CMC warmth and tenderness improved, mild MCP synovitis improved  Skin:  No rashes or ulcers, skin turgor is good  Neuro:  Cranial nerves are grossly intact, Rt side weakness strength 3/5 RUL and RLL  Psych:  Good insight, oriented to person, place and time, alert  __________________________________________________________________  Medications Reviewed: (see below)  Medications:     Current Facility-Administered Medications   Medication Dose Route Frequency    linaCLOtide (LINZESS) caspule 72 mcg (Patient Supplied)  72 mcg Oral ACB    pregabalin (LYRICA) capsule 150 mg  150 mg Oral TID    lidocaine (XYLOCAINE) 4 % cream   Topical TID PRN    predniSONE (DELTASONE) tablet 40 mg  40 mg Oral DAILY WITH BREAKFAST    pantoprazole (PROTONIX) tablet 40 mg  40 mg Oral ACB    [Held by provider] predniSONE (DELTASONE) tablet 10 mg  10 mg Oral DAILY WITH BREAKFAST    traMADoL (ULTRAM) tablet 50 mg  50 mg Oral Q6H PRN    traZODone (DESYREL) tablet 300 mg  300 mg Oral QHS    sodium chloride (NS) flush 5-40 mL  5-40 mL IntraVENous Q8H    sodium chloride (NS) flush 5-40 mL  5-40 mL IntraVENous PRN    acetaminophen (TYLENOL) tablet 650 mg  650 mg Oral Q6H PRN    Or    acetaminophen (TYLENOL) suppository 650 mg  650 mg Rectal Q6H PRN    polyethylene glycol (MIRALAX) packet 17 g  17 g Oral DAILY PRN    ondansetron (ZOFRAN ODT) tablet 4 mg  4 mg Oral Q8H PRN    Or    ondansetron (ZOFRAN) injection 4 mg  4 mg IntraVENous Q6H PRN    enoxaparin (LOVENOX) injection 40 mg  40 mg SubCUTAneous Q24H    atorvastatin (LIPITOR) tablet 80 mg  80 mg Oral DAILY    hydrOXYchloroQUINE (PLAQUENIL) tablet 200 mg  200 mg Oral BID    aspirin chewable tablet 81 mg  81 mg Oral DAILY    labetaloL (NORMODYNE;TRANDATE) 20 mg/4 mL (5 mg/mL) injection 20 mg  20 mg IntraVENous Q6H PRN    amLODIPine (NORVASC) tablet 10 mg  10 mg Oral DAILY        Lab Data Reviewed: (see below)  Lab Review:     Recent Labs     11/14/21  0338   WBC 3.7   HGB 12.0   HCT 37.9        Recent Labs     11/13/21  0255   *   K 4.2      CO2 29   *   BUN 19   CREA 0.86   CA 8.9     No results found for: GLUCPOC  No results for input(s): PH, PCO2, PO2, HCO3, FIO2 in the last 72 hours. No results for input(s): INR, INREXT, INREXT in the last 72 hours. All Micro Results     Procedure Component Value Units Date/Time    URINE CULTURE HOLD SAMPLE [[id number]] Collected: 11/07/21 1430    Order Status: Completed Specimen: Urine from Serum Updated: 11/07/21 1435     Urine culture hold       Urine on hold in Microbiology dept for 2 days. If unpreserved urine is submitted, it cannot be used for addtional testing after 24 hours, recollection will be required. I have reviewed notes of prior 24hr. Other pertinent lab: Total time spent with patient: 28 I personally reviewed chart, notes, data and current medications in the medical record.   I have personally examined and treated the patient at bedside during this period.                  Care Plan discussed with: Patient, Nursing Staff and >50% of time spent in counseling and coordination of care    Discussed:  Care Plan    Prophylaxis:  Lovenox    Disposition:  Home w/Family           ___________________________________________________    Attending Physician: Hortencia Plaza MD

## 2021-11-14 NOTE — PROGRESS NOTES
Bedside shift change report given to Graeme Ayon (oncoming nurse) by Kai Gomez (offgoing nurse). Report included the following information SBAR, Kardex, MAR and Recent Results.

## 2021-11-14 NOTE — PROGRESS NOTES
Bedside shift change report given to [address] [place] [address] (oncoming nurse) by Nevaeh Darby (offgoing nurse). Report included the following information SBAR, Kardex, Intake/Output, MAR and Recent Results.

## 2021-11-15 LAB — CANNABINOIDS BLD CFM-MCNC: 16 IU (ref 0–15)

## 2021-11-15 PROCEDURE — 97112 NEUROMUSCULAR REEDUCATION: CPT

## 2021-11-15 PROCEDURE — 74011250637 HC RX REV CODE- 250/637: Performed by: INTERNAL MEDICINE

## 2021-11-15 PROCEDURE — 97535 SELF CARE MNGMENT TRAINING: CPT

## 2021-11-15 PROCEDURE — 74011636637 HC RX REV CODE- 636/637: Performed by: INTERNAL MEDICINE

## 2021-11-15 PROCEDURE — 97116 GAIT TRAINING THERAPY: CPT

## 2021-11-15 PROCEDURE — 74011250636 HC RX REV CODE- 250/636: Performed by: INTERNAL MEDICINE

## 2021-11-15 PROCEDURE — 65660000000 HC RM CCU STEPDOWN

## 2021-11-15 RX ADMIN — PANTOPRAZOLE SODIUM 40 MG: 40 TABLET, DELAYED RELEASE ORAL at 06:02

## 2021-11-15 RX ADMIN — ATORVASTATIN CALCIUM 80 MG: 20 TABLET, FILM COATED ORAL at 08:07

## 2021-11-15 RX ADMIN — HYDROXYCHLOROQUINE SULFATE 200 MG: 200 TABLET ORAL at 08:07

## 2021-11-15 RX ADMIN — PREGABALIN 150 MG: 100 CAPSULE ORAL at 23:00

## 2021-11-15 RX ADMIN — PREDNISONE 40 MG: 20 TABLET ORAL at 08:07

## 2021-11-15 RX ADMIN — Medication 10 ML: at 06:03

## 2021-11-15 RX ADMIN — Medication 10 ML: at 13:55

## 2021-11-15 RX ADMIN — ASPIRIN 81 MG: 81 TABLET, CHEWABLE ORAL at 08:07

## 2021-11-15 RX ADMIN — PREGABALIN 150 MG: 100 CAPSULE ORAL at 17:14

## 2021-11-15 RX ADMIN — TRAZODONE HYDROCHLORIDE 300 MG: 100 TABLET ORAL at 23:00

## 2021-11-15 RX ADMIN — AMLODIPINE BESYLATE 10 MG: 5 TABLET ORAL at 08:07

## 2021-11-15 RX ADMIN — PREGABALIN 150 MG: 100 CAPSULE ORAL at 08:07

## 2021-11-15 RX ADMIN — HYDROXYCHLOROQUINE SULFATE 200 MG: 200 TABLET ORAL at 17:14

## 2021-11-15 RX ADMIN — ENOXAPARIN SODIUM 40 MG: 100 INJECTION SUBCUTANEOUS at 13:55

## 2021-11-15 RX ADMIN — Medication 10 ML: at 23:00

## 2021-11-15 NOTE — PROGRESS NOTES
Bedside shift change report given to Carla Alfonso (oncoming nurse) by Priscilla Valencia (offgoing nurse). Report included the following information SBAR, Kardex, Intake/Output, MAR and Recent Results.

## 2021-11-15 NOTE — PROGRESS NOTES
Problem: Mobility Impaired (Adult and Pediatric)  Goal: *Acute Goals and Plan of Care (Insert Text)  Description: FUNCTIONAL STATUS PRIOR TO ADMISSION: Patient was independent and active without use of DME.    HOME SUPPORT PRIOR TO ADMISSION: Patient normally lives in [place] with daughter. Does not require an assistive device, Independent with all ADL's, and was driving. Currently here in [facility] visiting sister and friend. She is staying with friend      Physical Therapy Goals  Initiated 11/15/2021  1. Patient will move from supine to sit and sit to supine , scoot up and down, and roll side to side in bed with independence within 7 day(s). 2.  Patient will transfer from bed to chair and chair to bed with independence using the least restrictive device within 7 day(s). 3.  Patient will perform sit to stand with independence within 7 day(s). 4.  Patient will ambulate with independence for 200 feet with the least restrictive device within 7 day(s). 5.  Patient will ascend/descend 7 stairs with 1 handrail(s) with modified independence within 7 day(s). 6.  Patient will improve Lawrence Balance score by 7 points within 7 days. Initiated 11/8/2021  1. Patient will move from supine to sit and sit to supine  in bed with minimal assistance/contact guard assist within 7 day(s). 2.  Patient will transfer from bed to chair and chair to bed with minimal assistance/contact guard assist using the least restrictive device within 7 day(s). 3.  Patient will perform sit to stand with minimal assistance/contact guard assist within 7 day(s). 4.  Patient will ambulate with minimal assistance/contact guard assist for 150 feet with the least restrictive device within 7 day(s).         Outcome: Progressing Towards Goal   PHYSICAL THERAPY TREATMENT: WEEKLY REASSESSMENT  Patient: Pablo Crowe (20 y.o. female)  Date: 11/15/2021  Primary Diagnosis: Right sided weakness [R53.1]  CV[facility]cerebral vascular accident) ([facility] [I63.9] Precautions:   Fall      ASSESSMENT  Patient continues with skilled PT services and is progressing towards goals. Pt making significant progress back towards baseline in regards to strength, balance and endurance. Progressed gait into hallway with RW. Verbal cueing to increase step clearance to avoid sliding feet across the floor. Following 150ft of ambulation RW trialed 75ft with hand rail and an additional 50ft without support. Pt does demo occasional softening of R hip/knee during gait training but at most only requires Min A x 1 during two episodes of minor LOB. Engaged in standing exercises and balance with fingertip support for stabilizing. Pt does not have insurance coverage for rehab in [address] [facility]. She will need supervision upon discharge for safety. Patient's progression toward goals since last assessment: significant progress towards all goals    Current Level of Function Impacting Discharge (mobility/balance): requires occasional steadying assist    Functional Outcome Measure: The patient scored 24/56 on the Lawrence outcome measure which is indicative of high fall risk. Other factors to consider for discharge: lives in [place]. Insurance does not cover rehab         PLAN :  Goals have been updated based on progression since last assessment. Patient continues to benefit from skilled intervention to address the above impairments. Recommendations and Planned Interventions: bed mobility training, transfer training, gait training, therapeutic exercises, neuromuscular re-education, patient and family training/education, and therapeutic activities      Frequency/Duration: Patient will be followed by physical therapy:  5 times a week to address goals.     Recommendation for discharge: (in order for the patient to meet his/her long term goals)  To be determined: outpatient vs HHPT    This discharge recommendation:  Has been made in collaboration with the attending provider and/or case management    IF patient discharges home will need the following DME: rolling walker         SUBJECTIVE:   Patient stated I want to stay here until December.     OBJECTIVE DATA SUMMARY:   HISTORY:    Past Medical History:   Diagnosis Date    Anemia     Arm vein blood clot, left     History of colon polyps     Lupus (HCC)     Neutropenia (HCC)     Pulmonary hypertension (HCC)      Past Surgical History:   Procedure Laterality Date    COLONOSCOPY N/A 4/21/2021    COLONOSCOPY performed by Joanne Bedoya MD at [facility] ENDOSCOPY    HX ANKLE FRACTURE TX Left     HX COLONOSCOPY         Personal factors and/or comorbidities impacting plan of care: CVA, HTN, lupus    Home Situation  Home Environment: Private residence  # Steps to Enter: 3  Rails to Enter: Yes  Hand Rails : Right  One/Two Story Residence: Two story, live on 1st floor  Living Alone: No  Support Systems: Other Family Member(s)  Patient Expects to be Discharged to[de-identified] House  Current DME Used/Available at Home: None    EXAMINATION/PRESENTATION/DECISION MAKING:   Critical Behavior:  Neurologic State: Alert  Orientation Level: Oriented X4  Cognition: Appropriate decision making, Appropriate safety awareness, Follows commands  Safety/Judgement: Awareness of environment  Hearing: Auditory  Auditory Impairment: None  Skin:    Edema:   Range Of Motion:  AROM: Within functional limits                       Strength:    Strength: Generally decreased, functional                    Tone & Sensation:   Tone: Normal                              Coordination:  Coordination: Within functional limits  Vision:      Functional Mobility:  Bed Mobility:  Rolling: Independent  Supine to Sit: Independent  Sit to Supine: Independent     Transfers:  Sit to Stand: Supervision  Stand to Sit: Supervision                       Balance:   Sitting: Intact  Sitting - Static: Good (unsupported)  Sitting - Dynamic: Good (unsupported)  Standing: Impaired  Standing - Static: Good;  Unsupported  Standing - Dynamic : Fair; Unsupported  Ambulation/Gait Training:  Distance (ft): 275 Feet (ft)  Assistive Device: Gait belt; Walker, rolling (50 with railing, 50ft no device)  Ambulation - Level of Assistance: Contact guard assistance        Gait Abnormalities: Decreased step clearance        Base of Support: Narrowed     Speed/Zhane: Slow; Pace decreased (<100 feet/min)  Step Length: Right shortened; Left shortened               Functional Measure:  Lawrence Balance Test:    Sitting to Standing: 3  Standing Unsupported: 3  Sitting with Back Unsupported: 4  Standing to Sittin  Transfers: 4  Standing Unsupported with Eyes Closed: 3  Standing Unsupported with Feet Together: 1  Reach Forward with Outstretched Arm: 1   Object: 0  Turn to Look Over Shoulders: 1  Turn 360 Degrees: 0  Alternate Foot on Step/Stool: 0  Standing Unsupported One Foot in Front: 0  Stand on One Le  Total: 24/56         56=Maximum possible score;   0-20=High fall risk  21-40=Moderate fall risk   41-56=Low fall risk       Activity Tolerance:   Good    After treatment patient left in no apparent distress:   Sitting in chair, Call bell within reach, and Bed / chair alarm activated    COMMUNICATION/EDUCATION:   The patients plan of care was discussed with: Registered nurse. Fall prevention education was provided and the patient/caregiver indicated understanding., Patient/family have participated as able in goal setting and plan of care. , and Patient/family agree to work toward stated goals and plan of care.     Thank you for this referral.  Jasmin Soares, PT   Time Calculation: 31 mins

## 2021-11-15 NOTE — PROGRESS NOTES
[facility]  [address] [facility] [place], [address] [place] [address]  [phone]      Medical Progress Note      NAME: Jyotsna Daniels   :  1965  MRM:  [id number]    Date of service: 11/15/2021        Assessment and Plan:   #Acute CVA/ Right sided weakness -  MRI brain: Acute small left posterior internal capsule/corona radiata lacunar infarct.  echo is unremarkable. Continues to make improvements while awaiting placement ([place])   - ASA, increased atorva to 80     #DELANEY: 2/2 lasix, volume down. She had been on this for pHTN, but reports that [address] several months ago had improved to normal, so she likely doesn't need this. Otherwise, no CHF, liver disease, proteinuria. Resolved. #Inflammatory Arthritis: R wrist, shoulder, elbow pain. CMC warmth as well as swan-necking so query RA overlap. inflammatory markers, which are fairly elevated despite being on chronic pred 10mg. C3 normal, C4 down; RA and CCP pending   - Burst pred 40mg , symptomatically much improved; anticipate taper back to 10mg    - RF pending, CCP neg; if neg would call this lupus arthritis flare given drop in C4    #Lupus erythematosus - On HCQ, but eval as above     #Pulmonary hypertension: had been on several medications for this in the past and was previously oxygen dependent. Recent RHC improved to normal though in rare instance of resolution.      #Neutropenia, chronic. Suspect this is due to Lupus, improved with roids     #GERD (gastroesophageal reflux disease) - PPI     #Osteopenia - hold alendronate              Subjective:     Chief Complaint[de-identified] Flor Hamming, feels better each day. Hesitant to try to get on plane and fly back to [place], and would prefer to find rehab placement here if able; SM still working on this     ROS:  (bold if positive, if negative)    Tolerating PT  Tolerating Diet        Objective:     Last 24hrs VS reviewed since prior progress note.  Most recent are:    Visit Vitals  /83 (BP 1 Location: Right arm, BP Patient Position: At rest)   Pulse 81   Temp 97.4 °F (36.3 °C)   Resp 17   Ht 5' 7.01\" (1.702 m)   Wt 74.4 kg (164 lb 0.4 oz)   SpO2 95%   BMI 25.68 kg/m²     SpO2 Readings from Last 6 Encounters:   11/15/21 95%   04/22/21 98%   04/03/19 98%            Intake/Output Summary (Last 24 hours) at 11/15/2021 1625  Last data filed at 11/15/2021 0800  Gross per 24 hour   Intake 50 ml   Output --   Net 50 ml        Physical Exam:    Gen:  Well-developed, well-nourished, in no acute distress  HEENT:  Pink conjunctivae, PERRL, hearing intact to voice, moist mucous membranes  Neck:  Supple, without masses, thyroid non-tender  Resp:  No accessory muscle use, clear breath sounds without wheezes rales or rhonchi  Card:  No murmurs, normal S1, S2 without thrills, bruits or peripheral edema  Abd:  Soft, non-tender, non-distended, normoactive bowel sounds are present  Musc: CMC warmth and tenderness improved, mild MCP synovitis improved  Skin:  No rashes or ulcers, skin turgor is good  Neuro:  Cranial nerves are grossly intact, Rt side weakness RUL and RLL  Psych:  Good insight, oriented to person, place and time, alert  __________________________________________________________________  Medications Reviewed: (see below)  Medications:     Current Facility-Administered Medications   Medication Dose Route Frequency    linaCLOtide (LINZESS) caspule 72 mcg (Patient Supplied)  72 mcg Oral ACB    pregabalin (LYRICA) capsule 150 mg  150 mg Oral TID    lidocaine (XYLOCAINE) 4 % cream   Topical TID PRN    predniSONE (DELTASONE) tablet 40 mg  40 mg Oral DAILY WITH BREAKFAST    pantoprazole (PROTONIX) tablet 40 mg  40 mg Oral ACB    [Held by provider] predniSONE (DELTASONE) tablet 10 mg  10 mg Oral DAILY WITH BREAKFAST    traMADoL (ULTRAM) tablet 50 mg  50 mg Oral Q6H PRN    traZODone (DESYREL) tablet 300 mg  300 mg Oral QHS    sodium chloride (NS) flush 5-40 mL  5-40 mL IntraVENous Q8H    sodium chloride (NS) flush 5-40 mL  5-40 mL IntraVENous PRN    acetaminophen (TYLENOL) tablet 650 mg  650 mg Oral Q6H PRN    Or    acetaminophen (TYLENOL) suppository 650 mg  650 mg Rectal Q6H PRN    polyethylene glycol (MIRALAX) packet 17 g  17 g Oral DAILY PRN    ondansetron (ZOFRAN ODT) tablet 4 mg  4 mg Oral Q8H PRN    Or    ondansetron (ZOFRAN) injection 4 mg  4 mg IntraVENous Q6H PRN    enoxaparin (LOVENOX) injection 40 mg  40 mg SubCUTAneous Q24H    atorvastatin (LIPITOR) tablet 80 mg  80 mg Oral DAILY    hydrOXYchloroQUINE (PLAQUENIL) tablet 200 mg  200 mg Oral BID    aspirin chewable tablet 81 mg  81 mg Oral DAILY    labetaloL (NORMODYNE;TRANDATE) 20 mg/4 mL (5 mg/mL) injection 20 mg  20 mg IntraVENous Q6H PRN    amLODIPine (NORVASC) tablet 10 mg  10 mg Oral DAILY        Lab Data Reviewed: (see below)  Lab Review:     Recent Labs     11/14/21  0338   WBC 3.7   HGB 12.0   HCT 37.9        Recent Labs     11/13/21  0255   *   K 4.2      CO2 29   *   BUN 19   CREA 0.86   CA 8.9     No results found for: GLUCPOC  No results for input(s): PH, PCO2, PO2, HCO3, FIO2 in the last 72 hours. No results for input(s): INR, INREXT, INREXT in the last 72 hours. All Micro Results     Procedure Component Value Units Date/Time    COVID-19 RAPID TEST [[id number]] Collected: 11/14/21 1809    Order Status: Completed Specimen: Nasopharyngeal Updated: 11/14/21 1838     Specimen source Nasopharyngeal        COVID-19 rapid test Not detected        Comment: Rapid Abbott ID Now       Rapid NAAT:  The specimen is NEGATIVE for SARS-CoV-2, the novel coronavirus associated with COVID-19. Negative results should be treated as presumptive and, if inconsistent with clinical signs and symptoms or necessary for patient management, should be tested with an alternative molecular assay.   Negative results do not preclude SARS-CoV-2 infection and should not be used as the sole basis for patient management decisions. This test has been authorized by the FDA under an Emergency Use Authorization (EUA) for use by authorized laboratories. Fact sheet for Healthcare Providers: ConventionUpdate.co.nz  Fact sheet for Patients: ConventionUpdate.co.nz       Methodology: Isothermal Nucleic Acid Amplification         URINE CULTURE HOLD SAMPLE [[id number]] Collected: 11/07/21 1430    Order Status: Completed Specimen: Urine from Serum Updated: 11/07/21 1435     Urine culture hold       Urine on hold in Microbiology dept for 2 days. If unpreserved urine is submitted, it cannot be used for addtional testing after 24 hours, recollection will be required. I have reviewed notes of prior 24hr. Other pertinent lab: Total time spent with patient: 28 I personally reviewed chart, notes, data and current medications in the medical record. I have personally examined and treated the patient at bedside during this period.                  Care Plan discussed with: Patient, Nursing Staff and >50% of time spent in counseling and coordination of care    Discussed:  Care Plan    Prophylaxis:  Lovenox    Disposition:  Home w/Family           ___________________________________________________    Attending Physician: Bassem Carnes DO

## 2021-11-15 NOTE — PROGRESS NOTES
Problem: Self Care Deficits Care Plan (Adult)  Goal: *Acute Goals and Plan of Care (Insert Text)  Description: FUNCTIONAL STATUS PRIOR TO ADMISSION: Patient was independent and active without use of DME.    HOME SUPPORT PRIOR TO ADMISSION: Patient normally lives in [place] with daughter. Does not require an assistive device, Independent with all ADL's, and was driving. Currently here in [place] visiting sister and friend. She is staying with friend    Occupational Therapy Goals  Initiated 11/8/2021. Weekly re-assessment 11/15/2021  1. Patient will perform grooming with supervision/setup using R, dominant, UE as fine manipulator within 7 day(s). - met  2. Patient will perform upper body dressing and bathing with minimum assistance assistance within 7 day(s). - met at RW level in stand, increase to least assistive device  3. Patient will perform lower body dressing and bathing with moderate assistance assistance within 7 day(s). - met at RW level in stand, increase to least resistive assistive device  4. Patient will perform toilet transfers with moderate assistance to [facility] using best technique within 7 day(s). - met at RW level in stand, increase to least resistive assistive device  5   Patient will participate in upper extremity therapeutic exercise/activities with minimal assistance with focus on R, dominant, UE strength, coordination, and attention within 7 day(s). - continue  6. Patient will use R, dominant, UE 75% of the time as fine manipulator with no cuing to increase independence with all ADL tasks within 7 days.        7.  Patient will engage in full Fugl-Zepeda Assessment of Upper Extremity outcome measure within 7 days.  -continue  Outcome: Progressing Towards Goal   OCCUPATIONAL THERAPY TREATMENT/WEEKLY RE-ASSESSMENT  Patient: Judy Strange (71 y.o. female)  Date: 11/15/2021  Diagnosis: Right sided weakness [R53.1]  CVA (cerebral vascular accident) ([facility] [address] [I63.9]   Right sided weakness       Precautions: Fall  Chart, occupational therapy assessment, plan of care, and goals were reviewed. ASSESSMENT  Patient continues with skilled OT services and is progressing towards goals. All ADL goals and related mobility met at  level for support in stand. Patient demonstrates improvements in RUE/RLE strength, standing tolerance, standing balance though all are still below baseline. Anticipate continued gains as patient is very motivated toward recovery. Patient did not use assistive device prior to admission- goals upgraded to reflect use of least resistive device. This will facilitate safer travels back to [place] when appropriate. Patient has no insurance benefits in this area. Follow up services to be determined, depending on level of support available at home     Current Level of Function Impacting Discharge (ADLs):     Other factors to consider for discharge:          PLAN :  Goals have been updated based on progression since last assessment. Patient continues to benefit from skilled intervention to address the above impairments. Continue to follow patient 5 times a week to address goals. Recommend with staff: in chair for meals, ambulate to bathroom     Recommend next OT session: provide handouts of UE/hand exercises for strength and coordination    Recommendation for discharge: (in order for the patient to meet his/her long term goals)  To be determined: This discharge recommendation:  Has been made in collaboration with the attending provider and/or case management    IF patient discharges home will need the following DME: patient owns DME required for discharge       SUBJECTIVE:   Patient pleasant and cooperative     OBJECTIVE DATA SUMMARY:   Cognitive/Behavioral Status:  Neurologic State: Alert  Orientation Level: Oriented X4  Cognition: Appropriate decision making; Appropriate safety awareness;  Follows commands             Functional Mobility and Transfers for ADLs:  Bed Mobility: Transfers:  Sit to Stand: Supervision  Functional Transfers  Bathroom Mobility: Supervision/set up  Adaptive Equipment: Walker (comment)       Balance:  Sitting: Intact; Without support  Sitting - Static: Good (unsupported)  Sitting - Dynamic: Good (unsupported)  Standing: Impaired; Without support  Standing - Static: Good; Unsupported  Standing - Dynamic : Fair    ADL Intervention:       Grooming  Grooming Assistance: Supervision  Position Performed: Standing                   Lower Body Dressing Assistance  Socks: Supervision    Toileting  Toileting Assistance: Supervision  Adaptive Equipment: Grab bars; Walker         Therapeutic Exercises:   Gross motor exercise- alternating, symmetrical UE movements completed in stand with smooth movement, quick reaction time noted. Stable in static stand with no assistive device  Instructed in fine motor exercise RUE- thumb opposition to distal, middle, and proximal section of each phalange    Pain:  none    Activity Tolerance:   Fair and requires rest breaks    After treatment patient left in no apparent distress:   Sitting in chair, Call bell within reach, and Caregiver / family present    COMMUNICATION/COLLABORATION:   The patients plan of care was discussed with: Physical therapist, Registered nurse, and Case management.      Vito Avery OT  Time Calculation: 27 mins

## 2021-11-15 NOTE — PROGRESS NOTES
Bedside shift change report given to 535Afia Ayon (oncoming nurse) by Geri Camejo (offgoing nurse). Report included the following information SBAR, Kardex, MAR and Recent Results.

## 2021-11-15 NOTE — PROGRESS NOTES
11/15/2021   CARE MANAGEMENT NOTE:  CM reviewed EMR for clinical updates. Pt was admitted with acute CVA; also Lupus. Reportedly, pt resides with her adult dtr Dtr. Lina Olson ([phone]) is a primary family contact.     RUR 10%; LOS 7 days     Transition Plan of Care:  1. Neurology is following pt for medical management  2. [facility] - out of network  [facility] (denied), [employer] (insurance denied), [facility] (accepted however same level of care of [employer])    3. CM will discuss alternate options with pt/dtr  4. Outpt f/u  5. Mode of transport to be determined     CM will continue to follow pt for definitive discharge plan.   Lula

## 2021-11-16 PROCEDURE — 74011250637 HC RX REV CODE- 250/637: Performed by: INTERNAL MEDICINE

## 2021-11-16 PROCEDURE — 74011250636 HC RX REV CODE- 250/636: Performed by: INTERNAL MEDICINE

## 2021-11-16 PROCEDURE — 97530 THERAPEUTIC ACTIVITIES: CPT

## 2021-11-16 PROCEDURE — 65660000000 HC RM CCU STEPDOWN

## 2021-11-16 PROCEDURE — 97116 GAIT TRAINING THERAPY: CPT

## 2021-11-16 PROCEDURE — 74011636637 HC RX REV CODE- 636/637: Performed by: INTERNAL MEDICINE

## 2021-11-16 PROCEDURE — 97110 THERAPEUTIC EXERCISES: CPT

## 2021-11-16 RX ADMIN — ATORVASTATIN CALCIUM 80 MG: 20 TABLET, FILM COATED ORAL at 09:02

## 2021-11-16 RX ADMIN — HYDROXYCHLOROQUINE SULFATE 200 MG: 200 TABLET ORAL at 17:07

## 2021-11-16 RX ADMIN — Medication 10 ML: at 14:48

## 2021-11-16 RX ADMIN — TRAZODONE HYDROCHLORIDE 300 MG: 100 TABLET ORAL at 21:04

## 2021-11-16 RX ADMIN — AMLODIPINE BESYLATE 10 MG: 5 TABLET ORAL at 09:02

## 2021-11-16 RX ADMIN — Medication 10 ML: at 07:06

## 2021-11-16 RX ADMIN — Medication 10 ML: at 21:04

## 2021-11-16 RX ADMIN — PREGABALIN 150 MG: 100 CAPSULE ORAL at 15:49

## 2021-11-16 RX ADMIN — PREGABALIN 150 MG: 100 CAPSULE ORAL at 09:02

## 2021-11-16 RX ADMIN — PREGABALIN 150 MG: 100 CAPSULE ORAL at 21:04

## 2021-11-16 RX ADMIN — ENOXAPARIN SODIUM 40 MG: 100 INJECTION SUBCUTANEOUS at 14:48

## 2021-11-16 RX ADMIN — PANTOPRAZOLE SODIUM 40 MG: 40 TABLET, DELAYED RELEASE ORAL at 07:05

## 2021-11-16 RX ADMIN — PREDNISONE 40 MG: 20 TABLET ORAL at 09:01

## 2021-11-16 RX ADMIN — HYDROXYCHLOROQUINE SULFATE 200 MG: 200 TABLET ORAL at 09:01

## 2021-11-16 RX ADMIN — ASPIRIN 81 MG: 81 TABLET, CHEWABLE ORAL at 09:02

## 2021-11-16 NOTE — PROGRESS NOTES
Bedside and Verbal shift change report given to Micheline Sharpe RN (oncoming nurse) by Maxine Vail RN (offgoing nurse). Report included the following information SBAR, Kardex, Intake/Output, MAR, Accordion, Recent Results and Med Rec Status.

## 2021-11-16 NOTE — PROGRESS NOTES
Problem: Self Care Deficits Care Plan (Adult)  Goal: *Acute Goals and Plan of Care (Insert Text)  Description: FUNCTIONAL STATUS PRIOR TO ADMISSION: Patient was independent and active without use of DME.    HOME SUPPORT PRIOR TO ADMISSION: Patient normally lives in [place] with daughter. Does not require an assistive device, Independent with all ADL's, and was driving. Currently here in [place] visiting sister and friend. She is staying with friend    Occupational Therapy Goals  Initiated 11/8/2021. Weekly re-assessment 11/15/2021  1. Patient will perform grooming with supervision/setup using R, dominant, UE as fine manipulator within 7 day(s). - met  2. Patient will perform upper body dressing and bathing with minimum assistance assistance within 7 day(s). - met at RW level in stand, increase to least assistive device  3. Patient will perform lower body dressing and bathing with moderate assistance assistance within 7 day(s). - met at RW level in stand, increase to least resistive assistive device  4. Patient will perform toilet transfers with moderate assistance to [facility] using best technique within 7 day(s). - met at RW level in stand, increase to least resistive assistive device  5   Patient will participate in upper extremity therapeutic exercise/activities with minimal assistance with focus on R, dominant, UE strength, coordination, and attention within 7 day(s). - continue  6. Patient will use R, dominant, UE 75% of the time as fine manipulator with no cuing to increase independence with all ADL tasks within 7 days.        7.  Patient will engage in full Fugl-Zepeda Assessment of Upper Extremity outcome measure within 7 days.  -continue  11/16/2021 1358 by Danii Brito OT  Outcome: Progressing Towards Goal   OCCUPATIONAL THERAPY TREATMENT  Patient: Jyotsna Daniels (8 y.o. female)  Date: 11/16/2021  Diagnosis: Right sided weakness [R53.1]  CVA (cerebral vascular accident) [facility]) [I63.9]   Right sided weakness Precautions: Fall  Chart, occupational therapy assessment, plan of care, and goals were reviewed. ASSESSMENT  Patient continues with skilled OT services and is progressing towards goals. Patient continues to make gains in functional use of RUE with strength training, motor skills activity. Patient would benefit from higher level ADL training (IADL) though this environment is limiting    Current Level of Function Impacting Discharge (ADLs): supervision to CGA for basic ADL and related mobility at RW level     Other factors to consider for discharge: Patient maintains high level of motivation          PLAN :  Patient continues to benefit from skilled intervention to address the above impairments. Continue treatment per established plan of care to address goals. Recommend with staff: ambulate to bathroom, active ADL participation     Recommend next OT session: progress POC     Recommendation for discharge: (in order for the patient to meet his/her long term goals)  To be determined: HH vs out patient     This discharge recommendation:  Has been made in collaboration with the attending provider and/or case management    IF patient discharges home will need the following DME: RW? SUBJECTIVE:   Patient pleasant and cooperative   OBJECTIVE DATA SUMMARY:   Cognitive/Behavioral Status:  Neurologic State: (P) Alert  Orientation Level: (P) Oriented X4  Cognition: (P) Follows commands; Appropriate decision making; Appropriate safety awareness             Functional Mobility and Transfers for ADLs:  Bed Mobility:       Transfers:  Sit to Stand: Contact guard assistance          Balance:  Standing: With support  Standing - Static: Good; Fair    ADL Intervention:          Therapeutic Exercises:   Patient provided with UE exercises handout, instructed in each exercise with emphasis on joint protection strategies. Used mod resistance therapy band. Good tolerance for all exercises.  Verbal cues with supervision required to ensure accurate exercise execution      Pain:  none    Activity Tolerance:   Good    After treatment patient left in no apparent distress:   Sitting in chair and Call bell within reach    COMMUNICATION/COLLABORATION:   The patients plan of care was discussed with: Registered nurse.      Mar Fierro, OT  Time Calculation: 19 mins

## 2021-11-16 NOTE — PROGRESS NOTES
[address] [place] [address]  [address] [facility], [address] [place] [address]  [phone]      Medical Progress Note      NAME: Daysi Ba   :  1965  MRM:  [id number]    Date of service: 2021        Assessment and Plan:   #Acute CVA/ Right sided weakness -  MRI brain: Acute small left posterior internal capsule/corona radiata lacunar infarct.  echo is unremarkable. Continues to make improvements while awaiting placement (Difficult[place])   - ASA, increased atorva to 80     #DELANEY: 2/2 lasix, volume down. She had been on this for pHTN, but reports that [address] several months ago had improved to normal, so she likely doesn't need this. Otherwise, no CHF, liver disease, proteinuria. Resolved. #Inflammatory Arthritis: R wrist, shoulder, elbow pain. [place]-necking so concern for RA overlap but RA only slightly elevated and  CCP normal; suspect lupus arthritis flare given drop in C4. Burst pred 40mg -, symptomatically much improved taper back to 10mg      #Lupus erythematosus - On HCQ, but eval as above     #Pulmonary hypertension: had been on several medications for this in the past and was previously oxygen dependent. Recent RHC improved to normal though in rare instance of resolution.      #Neutropenia, chronic. Suspect this is due to Lupus     #GERD (gastroesophageal reflux disease) - PPI     #Osteopenia - hold alendronate              Subjective:     Chief Complaint[de-identified] Mayuri Lazier, no dyspnea or CP, some fatigue and weakness. Cm still working on placement. ROS:  (bold if positive, if negative)    Tolerating PT  Tolerating Diet        Objective:     Last 24hrs VS reviewed since prior progress note.  Most recent are:    Visit Vitals  /67 (BP 1 Location: Right upper arm, BP Patient Position: At rest;Sitting)   Pulse 76   Temp 97.5 °F (36.4 °C)   Resp 16   Ht 5' 7.01\" (1.702 m)   Wt 74.4 kg (164 lb 0.4 oz)   SpO2 99%   BMI 25.68 kg/m²     SpO2 Readings from Last 6 Encounters:   11/16/21 99%   04/22/21 98%   04/03/19 98%            Intake/Output Summary (Last 24 hours) at 11/16/2021 1719  Last data filed at 11/16/2021 9793  Gross per 24 hour   Intake 300 ml   Output 1 ml   Net 299 ml        Physical Exam:    Gen:  Well-developed, well-nourished, in no acute distress  HEENT:  Pink conjunctivae, PERRL, hearing intact to voice, moist mucous membranes  Neck:  Supple, without masses, thyroid non-tender  Resp:  No accessory muscle use, clear breath sounds without wheezes rales or rhonchi  Card:  No murmurs, normal S1, S2 without thrills, bruits or peripheral edema  Abd:  Soft, non-tender, non-distended, normoactive bowel sounds are present  Musc: CMC warmth and tenderness improved, mild MCP synovitis improved  Skin:  No rashes or ulcers, skin turgor is good  Neuro:  Cranial nerves are grossly intact, Rt side weakness RUL and RLL  Psych:  Good insight, oriented to person, place and time, alert  __________________________________________________________________  Medications Reviewed: (see below)  Medications:     Current Facility-Administered Medications   Medication Dose Route Frequency    linaCLOtide (LINZESS) caspule 72 mcg (Patient Supplied)  72 mcg Oral ACB    pregabalin (LYRICA) capsule 150 mg  150 mg Oral TID    lidocaine (XYLOCAINE) 4 % cream   Topical TID PRN    predniSONE (DELTASONE) tablet 40 mg  40 mg Oral DAILY WITH BREAKFAST    pantoprazole (PROTONIX) tablet 40 mg  40 mg Oral ACB    [Held by provider] predniSONE (DELTASONE) tablet 10 mg  10 mg Oral DAILY WITH BREAKFAST    traMADoL (ULTRAM) tablet 50 mg  50 mg Oral Q6H PRN    traZODone (DESYREL) tablet 300 mg  300 mg Oral QHS    sodium chloride (NS) flush 5-40 mL  5-40 mL IntraVENous Q8H    sodium chloride (NS) flush 5-40 mL  5-40 mL IntraVENous PRN    acetaminophen (TYLENOL) tablet 650 mg  650 mg Oral Q6H PRN    Or    acetaminophen (TYLENOL) suppository 650 mg  650 mg Rectal Q6H PRN    polyethylene glycol (MIRALAX) packet 17 g  17 g Oral DAILY PRN    ondansetron (ZOFRAN ODT) tablet 4 mg  4 mg Oral Q8H PRN    Or    ondansetron (ZOFRAN) injection 4 mg  4 mg IntraVENous Q6H PRN    enoxaparin (LOVENOX) injection 40 mg  40 mg SubCUTAneous Q24H    atorvastatin (LIPITOR) tablet 80 mg  80 mg Oral DAILY    hydrOXYchloroQUINE (PLAQUENIL) tablet 200 mg  200 mg Oral BID    aspirin chewable tablet 81 mg  81 mg Oral DAILY    labetaloL (NORMODYNE;TRANDATE) 20 mg/4 mL (5 mg/mL) injection 20 mg  20 mg IntraVENous Q6H PRN    amLODIPine (NORVASC) tablet 10 mg  10 mg Oral DAILY        Lab Data Reviewed: (see below)  Lab Review:     Recent Labs     11/14/21  0338   WBC 3.7   HGB 12.0   HCT 37.9        No results for input(s): NA, K, CL, CO2, GLU, BUN, CREA, CA, MG, PHOS, ALB, TBIL, TBILI, ALT, INR, INREXT, INREXT in the last 72 hours. No lab exists for component: SGOT  No results found for: GLUCPOC  No results for input(s): PH, PCO2, PO2, HCO3, FIO2 in the last 72 hours. No results for input(s): INR, INREXT, INREXT in the last 72 hours. All Micro Results     Procedure Component Value Units Date/Time    COVID-19 RAPID TEST [[id number]] Collected: 11/14/21 1809    Order Status: Completed Specimen: Nasopharyngeal Updated: 11/14/21 1838     Specimen source Nasopharyngeal        COVID-19 rapid test Not detected        Comment: Rapid Abbott ID Now       Rapid NAAT:  The specimen is NEGATIVE for SARS-CoV-2, the novel coronavirus associated with COVID-19. Negative results should be treated as presumptive and, if inconsistent with clinical signs and symptoms or necessary for patient management, should be tested with an alternative molecular assay. Negative results do not preclude SARS-CoV-2 infection and should not be used as the sole basis for patient management decisions. This test has been authorized by the FDA under an Emergency Use Authorization (EUA) for use by authorized laboratories.    Fact sheet for Healthcare Providers: Shi.co.nz  Fact sheet for Patients: ConventionUpdate.co.nz       Methodology: Isothermal Nucleic Acid Amplification         URINE CULTURE HOLD SAMPLE [[id number]] Collected: 11/07/21 1430    Order Status: Completed Specimen: Urine from Serum Updated: 11/07/21 1435     Urine culture hold       Urine on hold in Microbiology dept for 2 days. If unpreserved urine is submitted, it cannot be used for addtional testing after 24 hours, recollection will be required. I have reviewed notes of prior 24hr. Other pertinent lab: Total time spent with patient: 32 I personally reviewed chart, notes, data and current medications in the medical record. I have personally examined and treated the patient at bedside during this period.                  Care Plan discussed with: Patient, Nursing Staff and >50% of time spent in counseling and coordination of care    Discussed:  Care Plan    Prophylaxis:  Lovenox    Disposition:  Home w/Family           ___________________________________________________    Attending Physician: Max Gurrola DO

## 2021-11-16 NOTE — PROGRESS NOTES
11/16/2021   CARE MANAGEMENT NOTE:  CM reviewed EMR for clinical updates.  Pt was admitted with acute CVA; also Lupus. Reportedly, pt resides with her adult dtr Dtr. Thelma Sarmiento ([phone]) is a primary family contact.     RUR 10%; LOS 8 days     Transition Plan of Care:  1. Neurology is following pt for medical management  2. Mary Kapadia pt's Medicare Advantage insurance and non-coverage/denials for [place] rehab, dtr agrees for pt to return to [place] for rehab. CM sent a referral to [facility] and [address] [place] [address] (ph [phone]; fax [phone]) for review per dtr's choice. CM had several unsuccessful attempts to reach dtr via phone today (unable to leave a )  3.  Outpt f/u  4.  Mode of transport to be determined     CM will continue to follow pt for definitive discharge plan.   Lula

## 2021-11-16 NOTE — PROGRESS NOTES
Bedside and Verbal shift change report given to Scarlett Rivas RN (oncoming nurse) by Mee Yanes RN (offgoing nurse). Report included the following information SBAR, Kardex, Intake/Output, MAR and Recent Results.

## 2021-11-16 NOTE — PROGRESS NOTES
Problem: Mobility Impaired (Adult and Pediatric)  Goal: *Acute Goals and Plan of Care (Insert Text)  Description: FUNCTIONAL STATUS PRIOR TO ADMISSION: Patient was independent and active without use of DME.    HOME SUPPORT PRIOR TO ADMISSION: Patient normally lives in [place] with daughter. Does not require an assistive device, Independent with all ADL's, and was driving. Currently here in [place] visiting sister and friend. She is staying with friend      Physical Therapy Goals  Initiated 11/15/2021  1. Patient will move from supine to sit and sit to supine , scoot up and down, and roll side to side in bed with independence within 7 day(s). 2.  Patient will transfer from bed to chair and chair to bed with independence using the least restrictive device within 7 day(s). 3.  Patient will perform sit to stand with independence within 7 day(s). 4.  Patient will ambulate with independence for 200 feet with the least restrictive device within 7 day(s). 5.  Patient will ascend/descend 7 stairs with 1 handrail(s) with modified independence within 7 day(s). 6.  Patient will improve Lawrence Balance score by 7 points within 7 days. Initiated 11/8/2021  1. Patient will move from supine to sit and sit to supine  in bed with minimal assistance/contact guard assist within 7 day(s). 2.  Patient will transfer from bed to chair and chair to bed with minimal assistance/contact guard assist using the least restrictive device within 7 day(s). 3.  Patient will perform sit to stand with minimal assistance/contact guard assist within 7 day(s). 4.  Patient will ambulate with minimal assistance/contact guard assist for 150 feet with the least restrictive device within 7 day(s).         Note:   PHYSICAL THERAPY TREATMENT  Patient: Pablo Crowe (67 y.o. female)  Date: 11/16/2021  Diagnosis: Right sided weakness [R53.1]  CVA (cerebral vascular accident) [facility]) [I63.9]   Right sided weakness       Precautions: Fall  Chart, physical therapy assessment, plan of care and goals were reviewed. ASSESSMENT  Patient continues with skilled PT services. Pt sit to stand with CGA. Pt ambulated 80ft with RW CGA. Pt then ambulated 80ft with hallway rail CGA and cues. Pt is safer and steadier with RW. Pt with improved advancing of right leg but still some lag noted. Pt left sitting in chair. Pt making good progress with mobility but remains a good rehab candidate,       PLAN :  Patient continues to benefit from skilled intervention to address the above impairments. Continue treatment per established plan of care. to address goals.     Recommendation for discharge: (in order for the patient to meet his/her long term goals)  Therapy 3 hours per day 5-7 days per week    This discharge recommendation:  Has been made in collaboration with the attending provider and/or case management    IF patient discharges home will need the following DME: rolling walker       SUBJECTIVE:       OBJECTIVE DATA SUMMARY:   Critical Behavior:  Neurologic State: (P) Alert  Orientation Level: (P) Oriented X4  Cognition: (P) Follows commands, Appropriate decision making, Appropriate safety awareness  Safety/Judgement: Awareness of environment  Functional Mobility Training:          Transfers:  Sit to Stand: Contact guard assistance  Stand to Sit: Contact guard assistance                             Balance:  Standing: With support  Standing - Static: Good; Fair  Ambulation/Gait Training:        Ambulation - Level of Assistance: Contact guard assistance with RW .        Gait Abnormalities: Antalgic; Decreased step clearance        Base of Support: Narrowed     Speed/Zhane: Pace decreased (<100 feet/min)  Step Length: Right shortened               Activity Tolerance:   Good    After treatment patient left in no apparent distress:   Sitting in chair    COMMUNICATION/COLLABORATION:   The patients plan of care was discussed with: Physical therapist.     Bernice Orourke, PTA

## 2021-11-17 PROCEDURE — 97116 GAIT TRAINING THERAPY: CPT

## 2021-11-17 PROCEDURE — 74011250637 HC RX REV CODE- 250/637: Performed by: INTERNAL MEDICINE

## 2021-11-17 PROCEDURE — 65270000029 HC RM PRIVATE

## 2021-11-17 PROCEDURE — 97530 THERAPEUTIC ACTIVITIES: CPT

## 2021-11-17 PROCEDURE — 74011250636 HC RX REV CODE- 250/636: Performed by: INTERNAL MEDICINE

## 2021-11-17 PROCEDURE — 74011636637 HC RX REV CODE- 636/637: Performed by: INTERNAL MEDICINE

## 2021-11-17 RX ADMIN — TRAMADOL HYDROCHLORIDE 50 MG: 50 TABLET, COATED ORAL at 17:35

## 2021-11-17 RX ADMIN — ASPIRIN 81 MG: 81 TABLET, CHEWABLE ORAL at 08:15

## 2021-11-17 RX ADMIN — TRAZODONE HYDROCHLORIDE 300 MG: 100 TABLET ORAL at 21:25

## 2021-11-17 RX ADMIN — ACETAMINOPHEN 650 MG: 325 TABLET ORAL at 07:02

## 2021-11-17 RX ADMIN — HYDROXYCHLOROQUINE SULFATE 200 MG: 200 TABLET ORAL at 08:16

## 2021-11-17 RX ADMIN — PREGABALIN 150 MG: 100 CAPSULE ORAL at 08:15

## 2021-11-17 RX ADMIN — ENOXAPARIN SODIUM 40 MG: 100 INJECTION SUBCUTANEOUS at 14:32

## 2021-11-17 RX ADMIN — HYDROXYCHLOROQUINE SULFATE 200 MG: 200 TABLET ORAL at 17:29

## 2021-11-17 RX ADMIN — PREDNISONE 10 MG: 5 TABLET ORAL at 08:16

## 2021-11-17 RX ADMIN — PREGABALIN 150 MG: 100 CAPSULE ORAL at 16:12

## 2021-11-17 RX ADMIN — ATORVASTATIN CALCIUM 80 MG: 20 TABLET, FILM COATED ORAL at 08:15

## 2021-11-17 RX ADMIN — AMLODIPINE BESYLATE 10 MG: 5 TABLET ORAL at 08:16

## 2021-11-17 RX ADMIN — Medication 10 ML: at 21:26

## 2021-11-17 RX ADMIN — PREGABALIN 150 MG: 100 CAPSULE ORAL at 21:25

## 2021-11-17 RX ADMIN — Medication 10 ML: at 16:09

## 2021-11-17 RX ADMIN — PANTOPRAZOLE SODIUM 40 MG: 40 TABLET, DELAYED RELEASE ORAL at 06:59

## 2021-11-17 RX ADMIN — Medication 10 ML: at 06:59

## 2021-11-17 NOTE — PROGRESS NOTES
Comprehensive Nutrition Assessment    Type and Reason for Visit: Reassess    Nutrition Recommendations/Plan:   1. Continue regular diet    Nutrition Assessment:    11/17: Follow up. PO continues to average 75% or more of all meals. Patient with no complaints at time of RD visit, besides feeling sleepy. Had BM yesterday. No chewing/swallowing problems. Last 3 Recorded Weights in this Encounter    11/07/21 1247 11/09/21 1137   Weight: 74.4 kg (164 lb) 74.4 kg (164 lb 0.4 oz)       11/12: Pt is a 55year old female admitted with Right sided weakness [R53.1]  CVA (cerebral vascular accident) ([facility] [address] [I63.9]. She has a past medical history of Anemia, Arm vein blood clot, left, History of colon polyps, Lupus, Neutropenia, and Pulmonary hypertension. PO intake averaging 75% or more of all meals documented. Nausea today, but no vomiting or diarrhea. States has not had BM during hospitalization. States she usually takes \"something - maybe linzess? \" for this. No chewing/swallowing problems. States # with no recent weight or appetite changes. NKFA. Wt Readings from Last 10 Encounters:   11/09/21 74.4 kg (164 lb 0.4 oz)   04/18/21 73.5 kg (162 lb)   04/03/19 88 kg (194 lb)       Malnutrition Assessment:  Malnutrition Status:  No malnutrition    Context:  Acute illness     Findings of the 6 clinical characteristics of malnutrition:   Energy Intake:  No significant decrease in energy intake  Weight Loss:  No significant weight loss     Body Fat Loss:  No significant body fat loss,     Muscle Mass Loss:  No significant muscle mass loss,    Fluid Accumulation:  No significant fluid accumulation,     Strength:  Not performed     Estimated Daily Nutrient Needs:  Energy (kcal): 1783 (MSJ x 1.3); Weight Used for Energy Requirements: Current  Protein (g): 60-74 (0.8-1.0);  Weight Used for Protein Requirements: Current  Fluid (ml/day): 1783; Method Used for Fluid Requirements: 1 ml/kcal    Nutrition Related Findings: ABD: WNL 11/17  Last BM: 11/16  Edema: None noted 11/17    Nutr. Labs:  Lab Results   Component Value Date/Time    GFR est AA >60 11/13/2021 02:55 AM    GFR est non-AA >60 11/13/2021 02:55 AM    Creatinine 0.86 11/13/2021 02:55 AM    BUN 19 11/13/2021 02:55 AM    Sodium 135 (L) 11/13/2021 02:55 AM    Potassium 4.2 11/13/2021 02:55 AM    Chloride 104 11/13/2021 02:55 AM    CO2 29 11/13/2021 02:55 AM     Lab Results   Component Value Date/Time    Glucose 120 (H) 11/13/2021 02:55 AM     Lab Results   Component Value Date/Time    Hemoglobin A1c 5.6 11/08/2021 03:48 AM     Nutr. Meds:  Norvasc  Lipitor  Lovenox  Labetalol  Linzess  Protonix  Miralax PRN    Wounds:    None       Current Nutrition Therapies:  ADULT DIET Regular; please send a boiled egg and applesauce for breakfasts    Anthropometric Measures:  · Height:  5' 7.01\" (170.2 cm)  · Current Body Wt:  74.4 kg (164 lb 0.4 oz)    · Ideal Body Wt:  135 lbs:  121.5 %   · Body mass index is 25.68 kg/m². · BMI Category: Overweight (BMI 25.0-29. 9)       Nutrition Diagnosis:   No nutrition diagnosis at this time     Nutrition Interventions:   Food and/or Nutrient Delivery: Continue current diet  Nutrition Education and Counseling: No recommendations at this time  Coordination of Nutrition Care: Continue to monitor while inpatient, Interdisciplinary rounds    Goals:  PO intake continues >/= 75% of all meals within 5 - 7 days       Nutrition Monitoring and Evaluation:   Behavioral-Environmental Outcomes: None identified  Food/Nutrient Intake Outcomes: Food and nutrient intake  Physical Signs/Symptoms Outcomes: Biochemical data, Weight    Discharge Planning:    No discharge needs at this time     Electronically signed by Mary Hope RD on 02/48/0845   Contact: [phone] or via [employer]

## 2021-11-17 NOTE — ROUTINE PROCESS
Bedside shift change report given to Terri (oncoming nurse) by Delene Boxer (offgoing nurse). Report included the following information SBAR, Kardex, ED Summary, Intake/Output, MAR, Recent Results and Med Rec Status.

## 2021-11-17 NOTE — PROGRESS NOTES
11/17/2021   CARE MANAGEMENT NOTE:  CM reviewed EMR for clinical updates.  Pt was admitted with acute CVA; also Lupus. Reportedly, pt resides with her adult dtr Dtr. Jessica Kemp ([phone]) is a primary family contact.     RUR 10%; LOS 9 days     Transition Plan of Care:  1. Neurology is following pt for medical management  2. Roberth Ma pt's Medicare Advantage insurance and non-coverage/denials for [place] rehab, dtr agrees for pt to return to [place] for rehab. CM sent a referral to [facility] and [address] [place] [address] (ph [phone]; fax [phone]) and decision is still pending. CM had several unsuccessful attempts to reach dtr via phone today and yesterday (unable to leave a vm)  3.  Outpt f/u  4.  Mode of transport to be determined     CM will continue to follow pt for SNF.   Lula

## 2021-11-17 NOTE — PROGRESS NOTES
Bedside shift change report given to Katalina Smith (oncoming nurse) by Sean Cornell (offgoing nurse). Report included the following information SBAR, Kardex, Intake/Output, MAR and Recent Results.

## 2021-11-17 NOTE — PROGRESS NOTES
[facility] [address]  [facility], [address] [place] [address]  [phone]      Medical Progress Note      NAME: Elvis Woo   :  1965  MRM:  [id number]    Date of service: 2021        Assessment and Plan:     #Acute CVA/ Right sided weakness -  MRI brain: Acute small left posterior internal capsule/corona radiata lacunar infarct.  echo is unremarkable. Continues to make improvements while awaiting placement (Difficulty [place])   - ASA, increased atorva to 80     #DELANEY: 2/2 lasix, volume down. She had been on this for pHTN, but reports that [address] several months ago had improved to normal, so she likely doesn't need this. Otherwise, no CHF, liver disease, proteinuria. Resolved. #Inflammatory Arthritis: R wrist, shoulder, elbow pain. [place]-necking so concern for RA overlap but RA only slightly elevated and  CCP normal; suspect lupus arthritis flare given drop in C4. Burst pred 40mg -, symptomatically much improvedback to 10mg      #Lupus erythematosus - On HCQ, but eval as above     #Pulmonary hypertension: had been on several medications for this in the past and was previously oxygen dependent. Recent RHC improved to normal though in rare instance of resolution.      #Neutropenia, chronic. Suspect this is due to Lupus     #GERD (gastroesophageal reflux disease) - PPI     #Osteopenia - hold alendronate              Subjective:     Chief Complaint[de-identified] Anneliese Emiliana, no dyspnea or CP, some fatigue and weakness. Cm still working on placement. ROS:  (bold if positive, if negative)    Tolerating PT  Tolerating Diet        Objective:     Last 24hrs VS reviewed since prior progress note.  Most recent are:    Visit Vitals  /78 (BP 1 Location: Right upper arm, BP Patient Position: At rest;Sitting)   Pulse 79   Temp 97.9 °F (36.6 °C)   Resp 8   Ht 5' 7.01\" (1.702 m)   Wt 74.4 kg (164 lb 0.4 oz)   SpO2 99%   BMI 25.68 kg/m²     SpO2 Readings from Last 6 Encounters:   11/17/21 99%   04/22/21 98%   04/03/19 98%            Intake/Output Summary (Last 24 hours) at 11/17/2021 1458  Last data filed at 11/17/2021 0700  Gross per 24 hour   Intake 0 ml   Output 0 ml   Net 0 ml        Physical Exam:    Gen:  Well-developed, well-nourished, in no acute distress  HEENT:  Pink conjunctivae, PERRL, hearing intact to voice, moist mucous membranes  Neck:  Supple, without masses, thyroid non-tender  Resp:  No accessory muscle use, clear breath sounds without wheezes rales or rhonchi  Card:  No murmurs, normal S1, S2 without thrills, bruits or peripheral edema  Abd:  Soft, non-tender, non-distended, normoactive bowel sounds are present  Musc: CMC warmth and tenderness improved, mild MCP synovitis improved  Skin:  No rashes or ulcers, skin turgor is good  Neuro:  Cranial nerves are grossly intact, Rt side weakness RUL and RLL  Psych:  Good insight, oriented to person, place and time, alert  __________________________________________________________________  Medications Reviewed: (see below)  Medications:     Current Facility-Administered Medications   Medication Dose Route Frequency    linaCLOtide (LINZESS) caspule 72 mcg (Patient Supplied)  72 mcg Oral ACB    pregabalin (LYRICA) capsule 150 mg  150 mg Oral TID    lidocaine (XYLOCAINE) 4 % cream   Topical TID PRN    pantoprazole (PROTONIX) tablet 40 mg  40 mg Oral ACB    predniSONE (DELTASONE) tablet 10 mg  10 mg Oral DAILY WITH BREAKFAST    traMADoL (ULTRAM) tablet 50 mg  50 mg Oral Q6H PRN    traZODone (DESYREL) tablet 300 mg  300 mg Oral QHS    sodium chloride (NS) flush 5-40 mL  5-40 mL IntraVENous Q8H    sodium chloride (NS) flush 5-40 mL  5-40 mL IntraVENous PRN    acetaminophen (TYLENOL) tablet 650 mg  650 mg Oral Q6H PRN    Or    acetaminophen (TYLENOL) suppository 650 mg  650 mg Rectal Q6H PRN    polyethylene glycol (MIRALAX) packet 17 g  17 g Oral DAILY PRN    ondansetron (ZOFRAN ODT) tablet 4 mg  4 mg Oral Q8H PRN    Or    ondansetron (ZOFRAN) injection 4 mg  4 mg IntraVENous Q6H PRN    enoxaparin (LOVENOX) injection 40 mg  40 mg SubCUTAneous Q24H    atorvastatin (LIPITOR) tablet 80 mg  80 mg Oral DAILY    hydrOXYchloroQUINE (PLAQUENIL) tablet 200 mg  200 mg Oral BID    aspirin chewable tablet 81 mg  81 mg Oral DAILY    labetaloL (NORMODYNE;TRANDATE) 20 mg/4 mL (5 mg/mL) injection 20 mg  20 mg IntraVENous Q6H PRN    amLODIPine (NORVASC) tablet 10 mg  10 mg Oral DAILY        Lab Data Reviewed: (see below)  Lab Review:     No results for input(s): WBC, HGB, HCT, PLT, HGBEXT, HCTEXT, PLTEXT, HGBEXT, HCTEXT, PLTEXT in the last 72 hours. No results for input(s): NA, K, CL, CO2, GLU, BUN, CREA, CA, MG, PHOS, ALB, TBIL, TBILI, ALT, INR, INREXT, INREXT in the last 72 hours. No lab exists for component: SGOT  No results found for: GLUCPOC  No results for input(s): PH, PCO2, PO2, HCO3, FIO2 in the last 72 hours. No results for input(s): INR, INREXT, INREXT in the last 72 hours. All Micro Results     Procedure Component Value Units Date/Time    COVID-19 RAPID TEST [[id number]] Collected: 11/14/21 1809    Order Status: Completed Specimen: Nasopharyngeal Updated: 11/14/21 1838     Specimen source Nasopharyngeal        COVID-19 rapid test Not detected        Comment: Rapid Abbott ID Now       Rapid NAAT:  The specimen is NEGATIVE for SARS-CoV-2, the novel coronavirus associated with COVID-19. Negative results should be treated as presumptive and, if inconsistent with clinical signs and symptoms or necessary for patient management, should be tested with an alternative molecular assay. Negative results do not preclude SARS-CoV-2 infection and should not be used as the sole basis for patient management decisions. This test has been authorized by the FDA under an Emergency Use Authorization (EUA) for use by authorized laboratories.    Fact sheet for Healthcare Providers: ConventionUpdate.co.nz  Fact sheet for Patients: ConventionUpdate.co.nz       Methodology: Isothermal Nucleic Acid Amplification         URINE CULTURE HOLD SAMPLE [[id number]] Collected: 11/07/21 1430    Order Status: Completed Specimen: Urine from Serum Updated: 11/07/21 1435     Urine culture hold       Urine on hold in Microbiology dept for 2 days. If unpreserved urine is submitted, it cannot be used for addtional testing after 24 hours, recollection will be required. I have reviewed notes of prior 24hr. Other pertinent lab: Total time spent with patient: 32 I personally reviewed chart, notes, data and current medications in the medical record. I have personally examined and treated the patient at bedside during this period.                  Care Plan discussed with: Patient, Nursing Staff and >50% of time spent in counseling and coordination of care    Discussed:  Care Plan    Prophylaxis:  Lovenox    Disposition:  Home w/Family           ___________________________________________________    Attending Physician: Wolfgang Gimenez DO

## 2021-11-17 NOTE — PROGRESS NOTES
Problem: Mobility Impaired (Adult and Pediatric)  Goal: *Acute Goals and Plan of Care (Insert Text)  Description: FUNCTIONAL STATUS PRIOR TO ADMISSION: Patient was independent and active without use of DME.    HOME SUPPORT PRIOR TO ADMISSION: Patient normally lives in [place] with daughter. Does not require an assistive device, Independent with all ADL's, and was driving. Currently here in [place] visiting sister and friend. She is staying with friend      Physical Therapy Goals  Initiated 11/15/2021  1. Patient will move from supine to sit and sit to supine , scoot up and down, and roll side to side in bed with independence within 7 day(s). 2.  Patient will transfer from bed to chair and chair to bed with independence using the least restrictive device within 7 day(s). 3.  Patient will perform sit to stand with independence within 7 day(s). 4.  Patient will ambulate with independence for 200 feet with the least restrictive device within 7 day(s). 5.  Patient will ascend/descend 7 stairs with 1 handrail(s) with modified independence within 7 day(s). 6.  Patient will improve Lawrence Balance score by 7 points within 7 days. Initiated 11/8/2021  1. Patient will move from supine to sit and sit to supine  in bed with minimal assistance/contact guard assist within 7 day(s). 2.  Patient will transfer from bed to chair and chair to bed with minimal assistance/contact guard assist using the least restrictive device within 7 day(s). 3.  Patient will perform sit to stand with minimal assistance/contact guard assist within 7 day(s). 4.  Patient will ambulate with minimal assistance/contact guard assist for 150 feet with the least restrictive device within 7 day(s).         Note:   PHYSICAL THERAPY TREATMENT  Patient: Jaycee Álvarez (60 y.o. female)  Date: 11/17/2021  Diagnosis: Right sided weakness [R53.1]  CVA (cerebral vascular accident) [facility]) [I63.9]   Right sided weakness       Precautions: Fall  Chart, physical therapy assessment, plan of care and goals were reviewed. ASSESSMENT  Patient continues with skilled PT services. Pt with right side weakness. Pt reported some pain to right shoulder  with ROM this AM.Pt sit to stand with CGA. Pt ambulated 80ft with RW CGA. Pt with decreased heel strike on right leg at increased distance. Pt given cues to pause as right leg fatigues. Pt then ambulated 80ft back to room with hallway rail for support CGA. Pt given same cues as she has decreased stability to right leg with increased distance. Pt left sitting. Continue goals. PLAN :  Patient continues to benefit from skilled intervention to address the above impairments. Continue treatment per established plan of care. to address goals.     Recommendation for discharge: (in order for the patient to meet his/her long term goals)  Therapy 3 hours per day 5-7 days per week    This discharge recommendation:  Has been made in collaboration with the attending provider and/or case management    IF patient discharges home will need the following DME: rolling walker       SUBJECTIVE:       OBJECTIVE DATA SUMMARY:   Critical Behavior:  Neurologic State: Alert, Appropriate for age, Eyes open spontaneously  Orientation Level: Appropriate for age, Oriented X4  Cognition: Appropriate decision making, Appropriate for age attention/concentration, Follows commands  Safety/Judgement: Awareness of environment  Functional Mobility Training:       Transfers:  Sit to Stand: Contact guard assistance  Stand to Sit: Contact guard assistance                             Balance:  Sitting: Intact  Sitting - Static: Good (unsupported)  Standing: With support  Standing - Static: Good  Ambulation/Gait Training:  Distance (ft): 160 Feet (ft)  Assistive Device: Gait belt; Walker, rolling  Ambulation - Level of Assistance: Contact guard assistance        Gait Abnormalities: Decreased step clearance        Base of Support: Narrowed     Speed/Zhane: Resource Interactive decreased (<100 feet/min)  Step Length: Right shortened; Left shortened            Activity Tolerance:   Fair to Good    After treatment patient left in no apparent distress:   Sitting in chair    COMMUNICATION/COLLABORATION:   The patients plan of care was discussed with: Physical therapist.     Yifan Ireland PTA   Time Calculation: 23 mins

## 2021-11-18 ENCOUNTER — APPOINTMENT (OUTPATIENT)
Dept: CT IMAGING | Age: 56
DRG: 065 | End: 2021-11-18
Attending: INTERNAL MEDICINE
Payer: MEDICARE

## 2021-11-18 ENCOUNTER — APPOINTMENT (OUTPATIENT)
Dept: MRI IMAGING | Age: 56
DRG: 065 | End: 2021-11-18
Attending: PSYCHIATRY & NEUROLOGY
Payer: MEDICARE

## 2021-11-18 ENCOUNTER — APPOINTMENT (OUTPATIENT)
Dept: GENERAL RADIOLOGY | Age: 56
DRG: 065 | End: 2021-11-18
Attending: INTERNAL MEDICINE
Payer: MEDICARE

## 2021-11-18 LAB
SARS-COV-2, COV2: NORMAL
SARS-COV-2, XPLCVT: NOT DETECTED
SOURCE, COVRS: NORMAL

## 2021-11-18 PROCEDURE — 74011250637 HC RX REV CODE- 250/637: Performed by: INTERNAL MEDICINE

## 2021-11-18 PROCEDURE — 74011636637 HC RX REV CODE- 636/637: Performed by: INTERNAL MEDICINE

## 2021-11-18 PROCEDURE — 73030 X-RAY EXAM OF SHOULDER: CPT

## 2021-11-18 PROCEDURE — 77030040361 HC SLV COMPR DVT MDII -B

## 2021-11-18 PROCEDURE — U0005 INFEC AGEN DETEC AMPLI PROBE: HCPCS

## 2021-11-18 PROCEDURE — 70551 MRI BRAIN STEM W/O DYE: CPT

## 2021-11-18 PROCEDURE — 70450 CT HEAD/BRAIN W/O DYE: CPT

## 2021-11-18 PROCEDURE — 65270000029 HC RM PRIVATE

## 2021-11-18 PROCEDURE — 97535 SELF CARE MNGMENT TRAINING: CPT

## 2021-11-18 PROCEDURE — 74011250636 HC RX REV CODE- 250/636: Performed by: INTERNAL MEDICINE

## 2021-11-18 RX ADMIN — PREGABALIN 150 MG: 100 CAPSULE ORAL at 15:43

## 2021-11-18 RX ADMIN — Medication 10 ML: at 21:43

## 2021-11-18 RX ADMIN — ATORVASTATIN CALCIUM 80 MG: 20 TABLET, FILM COATED ORAL at 09:17

## 2021-11-18 RX ADMIN — HYDROXYCHLOROQUINE SULFATE 200 MG: 200 TABLET ORAL at 09:17

## 2021-11-18 RX ADMIN — PREGABALIN 150 MG: 100 CAPSULE ORAL at 21:43

## 2021-11-18 RX ADMIN — ENOXAPARIN SODIUM 40 MG: 100 INJECTION SUBCUTANEOUS at 13:38

## 2021-11-18 RX ADMIN — PREGABALIN 150 MG: 100 CAPSULE ORAL at 09:17

## 2021-11-18 RX ADMIN — TRAZODONE HYDROCHLORIDE 300 MG: 100 TABLET ORAL at 22:21

## 2021-11-18 RX ADMIN — PANTOPRAZOLE SODIUM 40 MG: 40 TABLET, DELAYED RELEASE ORAL at 06:34

## 2021-11-18 RX ADMIN — Medication 10 ML: at 06:34

## 2021-11-18 RX ADMIN — AMLODIPINE BESYLATE 10 MG: 5 TABLET ORAL at 09:17

## 2021-11-18 RX ADMIN — ASPIRIN 81 MG: 81 TABLET, CHEWABLE ORAL at 09:17

## 2021-11-18 RX ADMIN — HYDROXYCHLOROQUINE SULFATE 200 MG: 200 TABLET ORAL at 16:47

## 2021-11-18 RX ADMIN — PREDNISONE 10 MG: 5 TABLET ORAL at 09:17

## 2021-11-18 RX ADMIN — Medication 10 ML: at 13:36

## 2021-11-18 NOTE — PROGRESS NOTES
Physical Therapy  Chart reviewed  Patient with new Left sided weakness and pain. Currently off the floor for imaging. We will continue to follow and re-attempt later as able. Thank you.   Donn Posada PT,DPT,NCS

## 2021-11-18 NOTE — PROGRESS NOTES
Problem: Self Care Deficits Care Plan (Adult)  Goal: *Acute Goals and Plan of Care (Insert Text)  Description: FUNCTIONAL STATUS PRIOR TO ADMISSION: Patient was independent and active without use of DME.    HOME SUPPORT PRIOR TO ADMISSION: Patient normally lives in [place] with daughter. Does not require an assistive device, Independent with all ADL's, and was driving. Currently here in [place] visiting sister and friend. She is staying with friend    Occupational Therapy Goals  Initiated 11/8/2021. Weekly re-assessment 11/15/2021  1. Patient will perform grooming with supervision/setup using R, dominant, UE as fine manipulator within 7 day(s). - met  2. Patient will perform upper body dressing and bathing with minimum assistance assistance within 7 day(s). - met at RW level in stand, increase to least assistive device  3. Patient will perform lower body dressing and bathing with moderate assistance assistance within 7 day(s). - met at RW level in stand, increase to least resistive assistive device  4. Patient will perform toilet transfers with moderate assistance to [facility] using best technique within 7 day(s). - met at RW level in stand, increase to least resistive assistive device  5   Patient will participate in upper extremity therapeutic exercise/activities with minimal assistance with focus on R, dominant, UE strength, coordination, and attention within 7 day(s). - continue  6. Patient will use R, dominant, UE 75% of the time as fine manipulator with no cuing to increase independence with all ADL tasks within 7 days.        7.  Patient will engage in full Fugl-Zepeda Assessment of Upper Extremity outcome measure within 7 days.  -continue  Outcome: Progressing Towards Goal   OCCUPATIONAL THERAPY TREATMENT  Patient: Chencho Lipoma (84 y.o. female)  Date: 11/18/2021  Diagnosis: Right sided weakness [R53.1]  CVA (cerebral vascular accident) [facility]) [I63.9]   Right sided weakness       Precautions: Fall  Chart, occupational therapy assessment, plan of care, and goals were reviewed. ASSESSMENT  Patient continues with skilled OT services and is progressing towards goals. Patient is still below baseline during ADL tasks as she now requires an assistive device for safe mobility. Min physical assist for tasks in stand/ADL without RW. Patient demonstrated good insight into limitations today, and continues to have great motivation toward improving ADL/IADL performance. Patient demonstrated LUE strength and ROM WFL though limited by shoulder pain. Onset was new after recent resistive exercises during OT session. Educated patient on ceasing added resistance and completing UE AROM only as tolerated related to pain. Current Level of Function Impacting Discharge (ADLs): minimal assistance tasks in stand and related mobility with no assistive device    Other factors to consider for discharge: independent PLOF         PLAN :  Patient continues to benefit from skilled intervention to address the above impairments. Continue treatment per established plan of care to address goals. Recommend with staff: up ad radha to bathroom    Recommend next OT session: progress POC- modify UE exercises (reduce resistance to prevent shoulder pain), increased complexity coordination exercises, higher level balance in stand for IADL re-training    Recommendation for discharge: (in order for the patient to meet his/her long term goals)   vs Out patient neuro based OT optimal setting     This discharge recommendation:  Has been made in collaboration with the attending provider and/or case management    IF patient discharges home will need the following DME: ? RW       SUBJECTIVE:   Patient pleasant and cooperative    OBJECTIVE DATA SUMMARY:   Cognitive/Behavioral Status:  Neurologic State: Alert  Orientation Level: Oriented X4  Cognition: Appropriate decision making; Appropriate safety awareness;  Follows commands             Functional Mobility and Transfers for ADLs:  Bed Mobility:  Supine to Sit: Independent    Transfers:  Sit to Stand: Supervision  Functional Transfers  Bathroom Mobility: Contact guard assistance  Toilet Transfer : Supervision  Cues: Physical assistance; Tactile cues provided; Verbal cues provided  Adaptive Equipment:  (no assistive device)  Bed to Chair: Contact guard assistance    Balance:       ADL Intervention:                           Lower Body Dressing Assistance  Slip on Shoes with Back: Set-up  Position Performed: Seated edge of bed    Toileting  Toileting Assistance: Supervision  Clothing Management: Supervision  Adaptive Equipment: Grab bars         Therapeutic Exercises:   Reviewed previous UE exercises- patient had L shoulder pain following use of mod resistance band. Advised patient to move through motions of exercises without added resistance    Pain:  L shoulder pain     Activity Tolerance:   Fair    After treatment patient left in no apparent distress:   Sitting in chair and Call bell within reach    COMMUNICATION/COLLABORATION:   The patients plan of care was discussed with: Physical therapist and Registered nurse.      Vito Avery OT  Time Calculation: 30 mins

## 2021-11-18 NOTE — PROGRESS NOTES
Bedside and Verbal shift change report given to Domingo Baker RN (oncoming nurse) by Kobi Garcia RN (offgoing nurse). Report included the following information SBAR, Kardex, Intake/Output, MAR, Accordion, Recent Results and Med Rec Status.

## 2021-11-18 NOTE — PROGRESS NOTES
[facility]  [address], [id number] [place] [address]  [phone]      Medical Progress Note      NAME: Shelli Osei   :  1965  MRM:  [id number]    Date of service: 2021        Assessment and Plan:     # Now with Left sided weakness/ Shoulder Pain: repeat CT head wo. Obtain Shoulder xray. Monitor     #Acute CVA/ Right sided weakness -  MRI brain: Acute small left posterior internal capsule/corona radiata lacunar infarct.  echo is unremarkable. Continues to make improvements while awaiting placement (Difficulty [place])   - ASA, increased atorva to 80     #DELANEY: 2/ lasix, volume down. She had been on this for pHTN, but reports that [address] several months ago had improved to normal, so she likely doesn't need this. Otherwise, no CHF, liver disease, proteinuria. Resolved. #Inflammatory Arthritis:  [place]-necking so concern for RA overlap but RA only slightly elevated and  CCP normal; suspect lupus arthritis flare given drop in C4. Burst pred 40mg -, symptomatically much improvedback to 10mg      #Lupus erythematosus - On HCQ, but eval as above     #Pulmonary hypertension: had been on several medications for this in the past and was previously oxygen dependent. Recent RHC improved to normal though in rare instance of resolution.      #Neutropenia, chronic. Suspect this is due to Lupus     #GERD (gastroesophageal reflux disease) - PPI     #Osteopenia - hold alendronate              Subjective:     Chief Complaint[de-identified] [facility] J[facility], no dyspnea or CP, reports left sided weakness and left sided shoulder pain today. ROS:  (bold if positive, if negative)    Tolerating PT  Tolerating Diet        Objective:     Last 24hrs VS reviewed since prior progress note.  Most recent are:    Visit Vitals  /76 (BP 1 Location: Right upper arm, BP Patient Position: At rest)   Pulse 68   Temp 97.8 °F (36.6 °C)   Resp 16   Ht 5' 7.01\" (1.702 m)   Wt 74.4 kg (164 lb 0.4 oz)   SpO2 98%   BMI 25.68 kg/m²     SpO2 Readings from Last 6 Encounters:   11/18/21 98%   04/22/21 98%   04/03/19 98%          No intake or output data in the 24 hours ending 11/18/21 1108     Physical Exam:    Gen:  Well-developed, well-nourished, in no acute distress  HEENT:  Pink conjunctivae, PERRL, hearing intact to voice, moist mucous membranes  Neck:  Supple, without masses, thyroid non-tender  Resp:  No accessory muscle use, clear breath sounds without wheezes rales or rhonchi  Card:  No murmurs, normal S1, S2 without thrills, bruits or peripheral edema  Abd:  Soft, non-tender, non-distended, normoactive bowel sounds are present  Musc: CMC warmth and tenderness improved, mild MCP synovitis improved  Skin:  No rashes or ulcers, skin turgor is good  Neuro:  Cranial nerves are grossly intact, Rt side weakness RUL and RLL, Now with LUQ weakness   Psych:  Good insight, oriented to person, place and time, alert  __________________________________________________________________  Medications Reviewed: (see below)  Medications:     Current Facility-Administered Medications   Medication Dose Route Frequency    linaCLOtide (LINZESS) caspule 72 mcg (Patient Supplied)  72 mcg Oral ACB    pregabalin (LYRICA) capsule 150 mg  150 mg Oral TID    lidocaine (XYLOCAINE) 4 % cream   Topical TID PRN    pantoprazole (PROTONIX) tablet 40 mg  40 mg Oral ACB    predniSONE (DELTASONE) tablet 10 mg  10 mg Oral DAILY WITH BREAKFAST    traMADoL (ULTRAM) tablet 50 mg  50 mg Oral Q6H PRN    traZODone (DESYREL) tablet 300 mg  300 mg Oral QHS    sodium chloride (NS) flush 5-40 mL  5-40 mL IntraVENous Q8H    sodium chloride (NS) flush 5-40 mL  5-40 mL IntraVENous PRN    acetaminophen (TYLENOL) tablet 650 mg  650 mg Oral Q6H PRN    Or    acetaminophen (TYLENOL) suppository 650 mg  650 mg Rectal Q6H PRN    polyethylene glycol (MIRALAX) packet 17 g  17 g Oral DAILY PRN    ondansetron (ZOFRAN ODT) tablet 4 mg  4 mg Oral Q8H PRN    Or    ondansetron (ZOFRAN) injection 4 mg  4 mg IntraVENous Q6H PRN    enoxaparin (LOVENOX) injection 40 mg  40 mg SubCUTAneous Q24H    atorvastatin (LIPITOR) tablet 80 mg  80 mg Oral DAILY    hydrOXYchloroQUINE (PLAQUENIL) tablet 200 mg  200 mg Oral BID    aspirin chewable tablet 81 mg  81 mg Oral DAILY    labetaloL (NORMODYNE;TRANDATE) 20 mg/4 mL (5 mg/mL) injection 20 mg  20 mg IntraVENous Q6H PRN    amLODIPine (NORVASC) tablet 10 mg  10 mg Oral DAILY        Lab Data Reviewed: (see below)  Lab Review:     No results for input(s): WBC, HGB, HCT, PLT, HGBEXT, HCTEXT, PLTEXT, HGBEXT, HCTEXT, PLTEXT in the last 72 hours. No results for input(s): NA, K, CL, CO2, GLU, BUN, CREA, CA, MG, PHOS, ALB, TBIL, TBILI, ALT, INR, INREXT, INREXT in the last 72 hours. No lab exists for component: SGOT  No results found for: GLUCPOC  No results for input(s): PH, PCO2, PO2, HCO3, FIO2 in the last 72 hours. No results for input(s): INR, INREXT, INREXT in the last 72 hours. All Micro Results     Procedure Component Value Units Date/Time    COVID-19 RAPID TEST [[id number]] Collected: 11/14/21 1809    Order Status: Completed Specimen: Nasopharyngeal Updated: 11/14/21 1838     Specimen source Nasopharyngeal        COVID-19 rapid test Not detected        Comment: Rapid Abbott ID Now       Rapid NAAT:  The specimen is NEGATIVE for SARS-CoV-2, the novel coronavirus associated with COVID-19. Negative results should be treated as presumptive and, if inconsistent with clinical signs and symptoms or necessary for patient management, should be tested with an alternative molecular assay. Negative results do not preclude SARS-CoV-2 infection and should not be used as the sole basis for patient management decisions. This test has been authorized by the FDA under an Emergency Use Authorization (EUA) for use by authorized laboratories.    Fact sheet for Healthcare Providers: ConventionUpdate.co.nz  Fact sheet for Patients: ConventionUpdate.co.nz       Methodology: Isothermal Nucleic Acid Amplification         URINE CULTURE HOLD SAMPLE [[id number]] Collected: 11/07/21 1430    Order Status: Completed Specimen: Urine from Serum Updated: 11/07/21 1435     Urine culture hold       Urine on hold in Microbiology dept for 2 days. If unpreserved urine is submitted, it cannot be used for addtional testing after 24 hours, recollection will be required. I have reviewed notes of prior 24hr. Other pertinent lab: Total time spent with patient: 32 I personally reviewed chart, notes, data and current medications in the medical record. I have personally examined and treated the patient at bedside during this period.                  Care Plan discussed with: Patient, Nursing Staff and >50% of time spent in counseling and coordination of care    Discussed:  Care Plan    Prophylaxis:  Lovenox    Disposition:  Home w/Family           ___________________________________________________    Attending Physician: Megan Oliver DO

## 2021-11-18 NOTE — PROGRESS NOTES
Bedside shift change report given to Alessandra Tavares (oncoming nurse) by Jess Oconnell (offgoing nurse). Report included the following information SBAR, Kardex, Intake/Output, MAR and Recent Results.

## 2021-11-18 NOTE — PROGRESS NOTES
11/18/2021   CARE MANAGEMENT NOTE:  CM reviewed EMR for clinical updates.  Pt was admitted with acute CVA; also Lupus. Reportedly, pt resides with her adult dtr Dtr. Robert Prescott ([phone]) is the primary family contact. Phone number updated this a.m.      RUR 9%; LOS 10 days     Transition Plan of Care:  1. Neurology is following pt for medical management  2. Daiva Puff pt's Medicare Advantage insurance and non-coverage/denials for [place] rehab, dtr agrees for pt to return to [place] for rehab. CM sent a referral to [facility]; fax [phone])  on 11/16 and no response despite multiple attempts on 11/7 and CM leaving two vm messages on 11/18. Dtr stated that she will assist by going to the facility directly during her lunch hour. CM contacted pt's insurance (customer service line ([phone]) and requested a list of in-network SNFs in [facility]. CM was told that this was not a possibility and further CM could do a search on [employer]. However this does not guarantee if the SNFs are in network. Dtr was not familiar with any facilities near her home with the exception of [facility] and they are not returning CM calls. Finally, Dtr granted permission for referrals to be sent to the following SNFs that CM found via Google search:  [place] ([phone]; fax [phone])  [facility]  ([phone]) - left [address] ([phone]; fax [phone] - denied  [facility] ([phone]; fax [phone])  3. Logistically, if pt is accepted by one of the aforementioned SNF and auth is obtained then pt and dtr are concerned about plane transportation within a timely manner. This will not be cost effective for plane travel with 1 to 2 day notice. Thanksgiving week will offer limited availability as well. 4.  Covid PCR was ordered on 11/18. 5.  Mode of transport to be determined     CM will continue to follow pt for SNF in [place]  RONDA Ellis

## 2021-11-18 NOTE — PROGRESS NOTES
11/18/21 1:11 PM  Patient living arrangements while in [place]: Joaquin FreemanieTaniyaCarlos, Yoni Orianajoanne with her best friend Rosa Maria New ([phone]). Patient willing to return to this address with her friend OR return to [place] with her daughter and receive outpatient therapy, if this is what is recommended.   SARAH Powers

## 2021-11-19 VITALS
DIASTOLIC BLOOD PRESSURE: 66 MMHG | SYSTOLIC BLOOD PRESSURE: 105 MMHG | BODY MASS INDEX: 25.74 KG/M2 | OXYGEN SATURATION: 98 % | HEART RATE: 83 BPM | RESPIRATION RATE: 18 BRPM | HEIGHT: 67 IN | TEMPERATURE: 98.1 F | WEIGHT: 164.02 LBS

## 2021-11-19 PROCEDURE — 99233 SBSQ HOSP IP/OBS HIGH 50: CPT | Performed by: PSYCHIATRY & NEUROLOGY

## 2021-11-19 PROCEDURE — 74011250637 HC RX REV CODE- 250/637: Performed by: INTERNAL MEDICINE

## 2021-11-19 PROCEDURE — 74011250636 HC RX REV CODE- 250/636: Performed by: INTERNAL MEDICINE

## 2021-11-19 PROCEDURE — 74011636637 HC RX REV CODE- 636/637: Performed by: INTERNAL MEDICINE

## 2021-11-19 PROCEDURE — 97116 GAIT TRAINING THERAPY: CPT

## 2021-11-19 RX ORDER — PREDNISONE 10 MG/1
10 TABLET ORAL
Qty: 30 TABLET | Refills: 0 | Status: SHIPPED | OUTPATIENT
Start: 2021-11-19

## 2021-11-19 RX ORDER — FUROSEMIDE 40 MG/1
40 TABLET ORAL DAILY
Qty: 1 TABLET | Refills: 0 | Status: SHIPPED
Start: 2021-11-19

## 2021-11-19 RX ORDER — GUAIFENESIN 100 MG/5ML
81 LIQUID (ML) ORAL DAILY
Qty: 90 TABLET | Refills: 0 | Status: SHIPPED | OUTPATIENT
Start: 2021-11-20

## 2021-11-19 RX ORDER — ATORVASTATIN CALCIUM 80 MG/1
80 TABLET, FILM COATED ORAL DAILY
Qty: 90 TABLET | Refills: 0 | Status: SHIPPED | OUTPATIENT
Start: 2021-11-20

## 2021-11-19 RX ORDER — HYDROXYCHLOROQUINE SULFATE 200 MG/1
200 TABLET, FILM COATED ORAL 2 TIMES DAILY
Qty: 60 TABLET | Refills: 0 | Status: SHIPPED | OUTPATIENT
Start: 2021-11-19

## 2021-11-19 RX ORDER — AMLODIPINE BESYLATE 10 MG/1
10 TABLET ORAL DAILY
Qty: 30 TABLET | Refills: 0 | Status: SHIPPED | OUTPATIENT
Start: 2021-11-20

## 2021-11-19 RX ADMIN — PANTOPRAZOLE SODIUM 40 MG: 40 TABLET, DELAYED RELEASE ORAL at 06:13

## 2021-11-19 RX ADMIN — Medication 10 ML: at 13:24

## 2021-11-19 RX ADMIN — Medication 10 ML: at 06:12

## 2021-11-19 RX ADMIN — ENOXAPARIN SODIUM 40 MG: 100 INJECTION SUBCUTANEOUS at 13:23

## 2021-11-19 RX ADMIN — LINACLOTIDE 145 MCG: 145 CAPSULE, GELATIN COATED ORAL at 06:13

## 2021-11-19 RX ADMIN — PREGABALIN 150 MG: 100 CAPSULE ORAL at 08:27

## 2021-11-19 RX ADMIN — AMLODIPINE BESYLATE 10 MG: 5 TABLET ORAL at 08:27

## 2021-11-19 RX ADMIN — ATORVASTATIN CALCIUM 80 MG: 20 TABLET, FILM COATED ORAL at 08:27

## 2021-11-19 RX ADMIN — PREDNISONE 10 MG: 5 TABLET ORAL at 08:27

## 2021-11-19 RX ADMIN — HYDROXYCHLOROQUINE SULFATE 200 MG: 200 TABLET ORAL at 08:27

## 2021-11-19 RX ADMIN — ASPIRIN 81 MG: 81 TABLET, CHEWABLE ORAL at 08:27

## 2021-11-19 NOTE — PROGRESS NOTES
11/19/2021   CARE MANAGEMENT NOTE:  CM reviewed EMR for clinical updates.  Pt was admitted with acute CVA; also Lupus. Reportedly, pt resides with her adult dtr. in [facility]. Dtr, Isidoro Angel ([phone]) is the primary family contact.  RUR 10%; QXV 85 RMZI     Transition Plan of Care:  1. Pt was discussed in IDRs this a.m. and given pt's progression with rehab therapies pt/dtr agree to have pt discharge and set up outpt vs HH in [place]  Of note, none of the SNFs in [place] have called me to date with the exception of [facility] and they denied. 2.  Plan is for pt to discharge to her friend's home in [facility] directly from the hospital until she is able to return to [facility]. PT will give pt exercises to do on her own in the interim. 3.  [facility] has scheduled a new pt appt in [place] with Dr. Rufina Jacobo on Wed., Nov 24 at 11:30 a.m.  CM faxed clinicals to their office from pt's current hospitalization at [facility]. 4.  Dtr is aware of above and she will make airline reservations ASAP - await call back later today with confirmation of flight details    CM will continue to follow pt until discharged.   Lula

## 2021-11-19 NOTE — PROGRESS NOTES
Problem: Mobility Impaired (Adult and Pediatric)  Goal: *Acute Goals and Plan of Care (Insert Text)  Description: FUNCTIONAL STATUS PRIOR TO ADMISSION: Patient was independent and active without use of DME.    HOME SUPPORT PRIOR TO ADMISSION: Patient normally lives in [place] with daughter. Does not require an assistive device, Independent with all ADL's, and was driving. Currently here in [place] visiting sister and friend. She is staying with friend      Physical Therapy Goals  Initiated 11/15/2021  1. Patient will move from supine to sit and sit to supine , scoot up and down, and roll side to side in bed with independence within 7 day(s). 2.  Patient will transfer from bed to chair and chair to bed with independence using the least restrictive device within 7 day(s). 3.  Patient will perform sit to stand with independence within 7 day(s). 4.  Patient will ambulate with independence for 200 feet with the least restrictive device within 7 day(s). 5.  Patient will ascend/descend 7 stairs with 1 handrail(s) with modified independence within 7 day(s). 6.  Patient will improve Lawrence Balance score by 7 points within 7 days. Initiated 11/8/2021  1. Patient will move from supine to sit and sit to supine  in bed with minimal assistance/contact guard assist within 7 day(s). 2.  Patient will transfer from bed to chair and chair to bed with minimal assistance/contact guard assist using the least restrictive device within 7 day(s). 3.  Patient will perform sit to stand with minimal assistance/contact guard assist within 7 day(s). 4.  Patient will ambulate with minimal assistance/contact guard assist for 150 feet with the least restrictive device within 7 day(s).         Note:   PHYSICAL THERAPY TREATMENT  Patient: Earlene Marsh (10 y.o. female)  Date: 11/19/2021  Diagnosis: Right sided weakness [R53.1]  CVA (cerebral vascular accident) [facility]) [I63.9]   Right sided weakness       Precautions: Fall  Chart, physical therapy assessment, plan of care and goals were reviewed. ASSESSMENT  Patient continues with skilled PT services and is progressing towards goals. Patient received siting up in chair. Continues to progress, she was able to complete all upright activity without assistive device and no loss of balance. ABle to perform dynamic standing balance activities and gait obstacle course without loss of balance. Slight Right LE fatigue without foot drop with continued upright activity,  she would benefit from transport chair for longer distance for safety with return to home. Patient instructed to purchase [facility] out of pocket. Verbalized understanding. Current Level of Function Impacting Discharge (mobility/balance): MOD I for transfers, CGA for ambulation of 350 feet without an assistive device    Other factors to consider for discharge: patient will be flying back to her home state [facility]. Daughter will be assistive with this         PLAN :  Patient continues to benefit from skilled intervention to address the above impairments. Continue treatment per established plan of care. to address goals. Recommendation for discharge: (in order for the patient to meet his/her long term goals)  Outpatient physical therapy follow up recommended for balance and neuro re-education    This discharge recommendation:  A follow-up discussion with the attending provider and/or case management is planned    IF patient discharges home will need the following DME: transport chair- order placed today        SUBJECTIVE:   Patient stated so am I going home tomorrow? Aranza Puri    OBJECTIVE DATA SUMMARY:   Critical Behavior:  Neurologic State: Alert, Appropriate for age  Orientation Level: Oriented X4  Cognition: Appropriate decision making, Appropriate for age attention/concentration, Appropriate safety awareness, Follows commands  Safety/Judgement: Awareness of environment  Vitals:    11/19/21 0036 11/19/21 0403 11/19/21 2930 11/19/21 1231   BP: (!) 145/85 138/84 123/79 109/69  Comment: rn notified   BP 1 Location: Right upper arm Right upper arm  Left upper arm   BP Patient Position: At rest At rest  Sitting   Pulse: 70 68 63 81   Temp: 98.9 °F (37.2 °C) 97.7 °F (36.5 °C) 97.8 °F (36.6 °C) 98.5 °F (36.9 °C)   Resp: 20 18 17 16   Height:       Weight:       SpO2: 100% 99% 97% 98%       Functional Mobility Training:  Bed Mobility:                    Transfers:  Sit to Stand: Modified independent  Stand to Sit: Modified independent        Bed to Chair: Supervision                    Balance:     Ambulation/Gait Training:  Distance (ft): 350 Feet (ft)  Assistive Device: Gait belt  Ambulation - Level of Assistance: Contact guard assistance                                               Stairs: Therapeutic Exercises:   Obstacle course: step-over and slalom  course: no assistive device, 50 feet each, repeated 4x  Side-stepping: right and left directions, no UE support SBA without loss of balance    Pain Rating:  None reported    Activity Tolerance:   Good    After treatment patient left in no apparent distress:   Sitting in chair, Call bell within reach, and Caregiver / family present    COMMUNICATION/COLLABORATION:   The patients plan of care was discussed with: Registered nurse.      Abdirashid Dewitt PT, DPT   Time Calculation: 18 mins

## 2021-11-19 NOTE — PROGRESS NOTES
Neurology - Inpatient Progress Note     Name:   Radha Son  MRN:    [id number]  [address] [facility] Date:   11/7/2021    Follow up: Increased neurologic sympotms    Interval History     Pt seen earlier by my colleague Dr Keith Lowe earlier in admission, on 11-8/ 11-9, for new onset right upper and lower extremity weakness. Brain MRI at that time showed acute small left posterior internal capsule/corona radiata lacunar stroke, more likely small vessel in etiology. CTA head and neck: No intracranial large vessel occlusion or hemodynamically significant stenosis. Bilateral axillary and prepectoral lymphadenopathy. 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 Plan at that time was ASA 81 mg/ day, Lipitor 80 mg QHS, pt to see Neuro[facility]/ Dr Tabitha Mendez as outpatient for aneurysm. Neurology was asked to revisit due to new, left sided weakness/ numbness. MRI Brain repeated last night. Shows small focus of subacute ischemia in the left basal ganglia/ posterior left corona radiata, representing evolution of previously described acute ischemia (noted on 11-8-2021). No evidence of new / other acute ischemia, no hemorrhage. Pertinent meds: ASA 81 mg/ day, Lipitor 80 mg QHS    Pt says she was experiencing difficulty shrugging shoulders due to pain in shoulders and also her right arm felt a little weaker. D/w above MRI findings with her/ Friend at bedside. No new stroke. The recent stroke shows expected evolution on imaging. She says she thinks the shoulder pain is due to arthritis.      Exam:     Awake,  Alert, conversant, EOMI, face symmetric normal speech and hearing  Intact LT in all exts  Right arm and leg 4/5   Left side 5/5   Pain with shoulder shrug    Patient Vitals for the past 24 hrs:   Temp Pulse Resp BP SpO2   11/19/21 1231 98.5 °F (36.9 °C) 81 16 109/69 98 %   11/19/21 0723 97.8 °F (36.6 °C) 63 17 123/79 97 %   11/19/21 0403 97.7 °F (36.5 °C) 68 18 138/84 99 %   11/19/21 0036 98.9 °F (37.2 °C) 70 20 (!) 145/85 100 %   11/18/21 2155 98 °F (36.7 °C) 68 21 110/71 100 %   11/18/21 1706 97.9 °F (36.6 °C) 82 16 118/78 98 %           Brief ROS: As noted above         Allergies   Allergen Reactions    Lotrel [Amlodipine-Benazepril] Anaphylaxis     Tongue swelling    Sudafed [Pseudoephedrine Hcl] Anaphylaxis    Codeine Other (comments)     Pt does not recall    Doxycycline Hives       Current Facility-Administered Medications:     linaCLOtide (LINZESS) capsule 145 mcg, 145 mcg, Oral, ACB, Julito, Elizabeth, DO, 145 mcg at 11/19/21 7703    pregabalin (LYRICA) capsule 150 mg, 150 mg, Oral, TID, Claritza Chinchilla MD, 150 mg at 11/19/21 0827    lidocaine (XYLOCAINE) 4 % cream, , Topical, TID PRN, Claritza Chinchilla MD, Given at 11/12/21 1624    pantoprazole (PROTONIX) tablet 40 mg, 40 mg, Oral, ACB, Rajiv Santos MD, 40 mg at 11/19/21 0613    predniSONE (DELTASONE) tablet 10 mg, 10 mg, Oral, DAILY WITH BREAKFAST, Rajiv Santos MD, 10 mg at 11/19/21 0827    traMADoL (ULTRAM) tablet 50 mg, 50 mg, Oral, Q6H PRN, Rajiv Santos MD, 50 mg at 11/17/21 1735    traZODone (DESYREL) tablet 300 mg, 300 mg, Oral, QHS, Rajiv Santos MD, 300 mg at 11/18/21 2221    sodium chloride (NS) flush 5-40 mL, 5-40 mL, IntraVENous, Q8H, Crow Cline MD, 10 mL at 11/19/21 1324    sodium chloride (NS) flush 5-40 mL, 5-40 mL, IntraVENous, PRN, Crow Cline MD    acetaminophen (TYLENOL) tablet 650 mg, 650 mg, Oral, Q6H PRN, 650 mg at 11/17/21 0702 **OR** acetaminophen (TYLENOL) suppository 650 mg, 650 mg, Rectal, Q6H PRN, Crow Cline MD    polyethylene glycol (MIRALAX) packet 17 g, 17 g, Oral, DAILY PRN, Crow Cline MD    ondansetron (ZOFRAN ODT) tablet 4 mg, 4 mg, Oral, Q8H PRN **OR** ondansetron (ZOFRAN) injection 4 mg, 4 mg, IntraVENous, Q6H PRN, Crow Cline MD, 4 mg at 11/11/21 9065    enoxaparin (LOVENOX) injection 40 mg, 40 mg, SubCUTAneous, Q24H, Crow Cline MD, 40 mg at 11/19/21 1323   atorvastatin (LIPITOR) tablet 80 mg, 80 mg, Oral, DAILY, Keturah West MD, 80 mg at 11/19/21 0827    hydrOXYchloroQUINE (PLAQUENIL) tablet 200 mg, 200 mg, Oral, BID, Keturah West MD, 200 mg at 11/19/21 0827    aspirin chewable tablet 81 mg, 81 mg, Oral, DAILY, Keturah West MD, 81 mg at 11/19/21 0827    labetaloL (NORMODYNE;TRANDATE) 20 mg/4 mL (5 mg/mL) injection 20 mg, 20 mg, IntraVENous, Q6H PRN, Keturah West MD, 20 mg at 11/08/21 1045    amLODIPine (NORVASC) tablet 10 mg, 10 mg, Oral, DAILY, Keturah West MD, 10 mg at 11/19/21 0827      PMHx: has a past medical history of Anemia, Arm vein blood clot, left, History of colon polyps, Lupus ([address] Neutropenia ([address] and Pulmonary hypertension ([address] She also has no past medical history of Difficult intubation, Malignant hyperthermia due to anesthesia, Nausea & vomiting, or Pseudocholinesterase deficiency. PSHx: has a past surgical history that includes hx colonoscopy; hx ankle fracture tx (Left); and colonoscopy (N/A, 4/21/2021). Impression / Plan       ICD-10-CM ICD-9-CM   1. Left-sided weakness  R53.1 728.87   2. Acute right-sided weakness  R53.1 728.87   3. Cerebrovascular accident (CVA) due to other mechanism ([address]  I63.89 434.91     No new/ interval stroke as noted above  Continue Lipitor, ASA     Entered contact info in d/c summary for patient to schedule follow up with Dr [facility] regarding recent stroke, and for her to schedule appt to see Neuro-Interventional surgery regarding her small cerebral aneurysm.      Will s/o            Signed By: Kee Greene MD     November 19, 2021

## 2021-11-19 NOTE — PROGRESS NOTES
VERBAL_RECORDED_WRITTEN:[id number]::\"Bedside\"} shift change report given to Aide Swanson (oncoming nurse) by [facility] (offgoing nurse). Report included the following information SBAR, Kardex, ED Summary, Procedure Summary, Intake/Output, MAR, Recent Results and Med Rec Status.

## 2021-11-19 NOTE — PROGRESS NOTES
11/19/2021  2:23 PM  Medicare pt has received, reviewed, and signed 2nd IM letter informing them of their right to appeal the discharge. Signed copied has been placed on pt bedside chart.   CAROL Villalta

## 2021-11-19 NOTE — DISCHARGE SUMMARY
[facility]  [address]  [phone]    Physician Discharge Summary     Patient ID:  Jannifer Boeck  [id number]  55 y.o.  1965    Admit date: 11/7/2021    Discharge date and time: 11/19/2021 2:06 PM    Admission Diagnoses: Right sided weakness [R53.1]  CVA (cerebral vascular accident) [facility]) [I63.9]    Discharge Diagnoses:  Principal Diagnosis Right sided weakness                                            Principal Problem:    Right sided weakness (11/7/2021)    Active Problems:    Lupus erythematosus (4/18/2021)      Bilateral lower extremity edema (4/18/2021)      GERD (gastroesophageal reflux disease) (4/18/2021)      Pulmonary hypertension ([address] (4/18/2021)      Neutropenia ([address] (11/7/2021)      Dehydration (11/7/2021)      CVA (cerebral vascular accident) ([address] (11/8/2021)           Hospital Course:     #Acute CVA/ Right sided weakness:  MRI brain: Acute small left posterior internal capsule/corona radiata lacunar infarct.  echo is unremarkable. 11/18 reported left sided weakness that has now improved and repeat MRI 11/18 stable from prior. Continue aspirin and statin on discharge. Amlodipine for blood pressure control. Made improvements while awaiting placement; cleared for OP PT/OT versus HH by PT/OT. Spoke with patient and daughter today in order to ensure safe discharge. CM set up apt for patient with PCP in [place] for 11/24 at which time they can help facilitate HH. Provided patient with OP PT/Ot script on DC. Daughter agrees safe to discharge patient here in [place] to go home with Terry English friend, and she and daughter will arrange patient's flight back to [place]. F/u neurology OP. Directed not to drive due to stroke. #Cerebral aneurysm: noted by neurology today. F/u neuroradiologist  OP. #DELANEY: 2/2 lasix, volume down.  She had been on this for pHTN, but reports that [address] several months ago had improved to normal, so she likely doesn't need this. Otherwise, no CHF, liver disease, proteinuria. Resolved. F/u PCP OP.      #Inflammatory Arthritis:  [place] so concern for RA overlap but RA only slightly elevated and  CCP normal; suspect lupus arthritis flare given drop in C4. Burst pred 40mg 11/11-11/16, symptomatically much improved back to 10mg on DC. F/u PCP OP. #Lupus erythematosus - On HCQ, but eval as above     #Pulmonary hypertension: had been on several medications for this in the past and was previously oxygen dependent. Recent RHC improved to normal though in rare instance of resolution. F/u PCP OP.      #Neutropenia, chronic. Suspect this is due to Lupus. Resolved.      #GERD (gastroesophageal reflux disease) - PPI and H2      #Osteopenia - hold alendronate    PCP: Other, MD Robbin     Consults: Neurology    Significant Diagnostic Studies:     MRI Brain   FINDINGS:    Ventricles:  Midline, no hydrocephalus. Brain Parenchyma/Brainstem:  Mild patchy chronic T2 hyperintensity in the  supratentorial brain. Small focus of now subacute infarction posterior left  corona radiata, similar in size to prior study. No new areas of acute  infarction. Intracranial Hemorrhage:  None. Basal Cisterns:  Normal.   Flow Voids:  Normal.  Additional Comments:  N/A.     IMPRESSION  Evolution of small, now subacute infarction posterior left corona radiata. No  new abnormality.       Discharge Exam:  Physical Exam:    Gen:  Well-developed, well-nourished, in no acute distress  HEENT:  Pink conjunctivae, PERRL, hearing intact to voice  Resp:  No accessory muscle use, clear breath sounds without wheezes rales or rhonchi  Card:  No murmurs, normal S1, S2 without thrills, bruits or peripheral edema  Abd:  Soft, non-tender, non-distended, normoactive bowel sounds are present  Skin:  No rashes or ulcers, skin turgor is good  MSK: [place] of hands evident   Neuro:  Cranial nerves are grossly intact, Rt side weakness 4/5   Psych: Good insight, oriented to person, place and time, alert      Disposition: home  Discharge Condition: Stable    Patient Instructions:   Current Discharge Medication List      START taking these medications    Details   aspirin 81 mg chewable tablet Take 1 Tablet by mouth daily. Qty: 90 Tablet, Refills: 0         CONTINUE these medications which have CHANGED    Details   predniSONE (DELTASONE) 10 mg tablet Take 10 mg by mouth daily (with breakfast). Qty: 30 Tablet, Refills: 0      amLODIPine (NORVASC) 10 mg tablet Take 1 Tablet by mouth daily. Qty: 30 Tablet, Refills: 0      hydrOXYchloroQUINE (PlaqueniL) 200 mg tablet Take 1 Tablet by mouth two (2) times a day. Qty: 60 Tablet, Refills: 0      atorvastatin (LIPITOR) 80 mg tablet Take 1 Tablet by mouth daily. Qty: 90 Tablet, Refills: 0         CONTINUE these medications which have NOT CHANGED    Details   traZODone (DESYREL) 150 mg tablet Take 300 mg by mouth nightly. linaCLOtide (Linzess) 72 mcg cap capsule Take 72 mcg by mouth Daily (before breakfast). omeprazole (PRILOSEC) 40 mg capsule Take 40 mg by mouth daily. traMADoL (ULTRAM) 50 mg tablet Take 50 mg by mouth every six (6) hours as needed for Pain. pregabalin (LYRICA) 150 mg capsule Take 150 mg by mouth two (2) times a day. Indications: pain      famotidine (PEPCID) 40 mg tablet Take 40 mg by mouth daily.  Indications: gastroesophageal reflux disease         STOP taking these medications       magnesium oxide (MAG-OX) 400 mg tablet Comments:   Reason for Stopping:         potassium chloride (Klor-Con M20) 20 mEq tablet Comments:   Reason for Stopping:         furosemide (Lasix) 40 mg tablet Comments:   Reason for Stopping:             Activity: Activity as tolerated  Diet: Cardiac Diet  Wound Care: None needed    Follow-up with  Follow-up Information     Follow up With Specialties Details Why Contact Info    Please return patient's own Linzess 72mcg caps at d/c;  Located in patient supplied bin in pyxis. Moris Oquendo MD Endovascular Surgical Neuroradiology Schedule an appointment as soon as possible for a visit in 4 weeks Call to schedule new patient appointment with Dr Pema Ferreira regarding cerebral aneurysm. Gaby James MD Neurology Schedule an appointment as soon as possible for a visit in 5 weeks Call to schedule follow up visit with Neurologist after recent stroke. [place] [id number] [facility]  [phone]         Please go to your appointment with Dr. Ladarius Young on Wed., Nov 24 at 11:30 a.m. in [place]. If you remain in [place], please follow up with a neurologist for your stroke and a Endovascular Surgical Neuroradiologist in [place] within the time periods above. Please do not drive due to your stroke as included in your discharge instructions. Follow-up tests/labs as above.      Signed:  Nancy Toure DO  11/19/2021  2:06 PM  **I personally spent 35 min on discharge**

## 2021-11-19 NOTE — PROGRESS NOTES
Denied having dizziness, nausea, or trouble breathing. Stated bilat shoulder pain w/hx, and lyrica helps treat pain.

## 2022-03-18 PROBLEM — K21.9 GERD (GASTROESOPHAGEAL REFLUX DISEASE): Status: ACTIVE | Noted: 2021-04-18

## 2022-03-18 PROBLEM — L93.0 LUPUS ERYTHEMATOSUS: Status: ACTIVE | Noted: 2021-04-18

## 2022-03-18 PROBLEM — R53.1 RIGHT SIDED WEAKNESS: Status: ACTIVE | Noted: 2021-11-07

## 2022-03-18 PROBLEM — D70.9 NEUTROPENIA (HCC): Status: ACTIVE | Noted: 2021-11-07

## 2022-03-19 PROBLEM — I27.20 PULMONARY HYPERTENSION (HCC): Status: ACTIVE | Noted: 2021-04-18

## 2022-03-19 PROBLEM — E86.0 DEHYDRATION: Status: ACTIVE | Noted: 2021-11-07

## 2022-03-19 PROBLEM — R60.0 BILATERAL LOWER EXTREMITY EDEMA: Status: ACTIVE | Noted: 2021-04-18

## 2022-03-19 PROBLEM — I63.9 CVA (CEREBRAL VASCULAR ACCIDENT) (HCC): Status: ACTIVE | Noted: 2021-11-08

## 2022-08-09 ENCOUNTER — APPOINTMENT (OUTPATIENT)
Dept: GENERAL RADIOLOGY | Age: 57
End: 2022-08-09
Attending: STUDENT IN AN ORGANIZED HEALTH CARE EDUCATION/TRAINING PROGRAM
Payer: MEDICARE

## 2022-08-09 ENCOUNTER — HOSPITAL ENCOUNTER (EMERGENCY)
Age: 57
Discharge: HOME | End: 2022-08-09
Attending: STUDENT IN AN ORGANIZED HEALTH CARE EDUCATION/TRAINING PROGRAM
Payer: MEDICARE

## 2022-08-09 VITALS
WEIGHT: 165 LBS | HEART RATE: 72 BPM | RESPIRATION RATE: 16 BRPM | OXYGEN SATURATION: 95 % | SYSTOLIC BLOOD PRESSURE: 106 MMHG | HEIGHT: 67 IN | BODY MASS INDEX: 25.9 KG/M2 | TEMPERATURE: 98.6 F | DIASTOLIC BLOOD PRESSURE: 62 MMHG

## 2022-08-09 DIAGNOSIS — M25.472 ANKLE EFFUSION, LEFT: Primary | ICD-10-CM

## 2022-08-09 PROCEDURE — 99284 EMERGENCY DEPT VISIT MOD MDM: CPT

## 2022-08-09 PROCEDURE — 73610 X-RAY EXAM OF ANKLE: CPT

## 2022-08-09 PROCEDURE — 74011250636 HC RX REV CODE- 250/636: Performed by: STUDENT IN AN ORGANIZED HEALTH CARE EDUCATION/TRAINING PROGRAM

## 2022-08-09 PROCEDURE — 96372 THER/PROPH/DIAG INJ SC/IM: CPT

## 2022-08-09 RX ORDER — KETOROLAC TROMETHAMINE 30 MG/ML
30 INJECTION, SOLUTION INTRAMUSCULAR; INTRAVENOUS ONCE
Status: COMPLETED | OUTPATIENT
Start: 2022-08-09 | End: 2022-08-09

## 2022-08-09 RX ADMIN — KETOROLAC TROMETHAMINE 30 MG: 30 INJECTION, SOLUTION INTRAMUSCULAR; INTRAVENOUS at 15:59

## 2022-08-09 NOTE — DISCHARGE INSTRUCTIONS
If you have worsening swelling, fever, chills, increased pain or drainage from the wound please return. Make an appointment with Val Hills, orthopedic ankle surgeon.

## 2022-08-09 NOTE — ED PROVIDER NOTES
Date: 8/9/2022  Patient Name: Angelina Mccoy    History of Presenting Illness     Chief Complaint   Patient presents with    Ankle Pain     left       History Provided By: Patient    HPI: Angelina Mccoy, 65 y.o. female  presents to the ED with cc of ankle pain. States that she has history of lupus. The ankle started swelling without known injury about 4 days ago. She had a history of an ankle fracture. She has not had a specific inciting event. No fevers or chills. Has not noticed any redness around the joint. Denies change in appetite. No wound drainage. No history of gout or pseudogout. There are no other complaints, changes, or physical findings at this time. PCP: Other, MD Robbin    No current facility-administered medications on file prior to encounter. Current Outpatient Medications on File Prior to Encounter   Medication Sig Dispense Refill    predniSONE (DELTASONE) 10 mg tablet Take 10 mg by mouth daily (with breakfast). 30 Tablet 0    amLODIPine (NORVASC) 10 mg tablet Take 1 Tablet by mouth daily. 30 Tablet 0    hydrOXYchloroQUINE (PlaqueniL) 200 mg tablet Take 1 Tablet by mouth two (2) times a day. 60 Tablet 0    aspirin 81 mg chewable tablet Take 1 Tablet by mouth daily. 90 Tablet 0    atorvastatin (LIPITOR) 80 mg tablet Take 1 Tablet by mouth daily. 90 Tablet 0    furosemide (Lasix) 40 mg tablet Take 1 Tablet by mouth daily. Please do not resume this medication unless directed to do so by your primary care doctor. 1 Tablet 0    traZODone (DESYREL) 150 mg tablet Take 300 mg by mouth nightly. linaCLOtide (Linzess) 72 mcg cap capsule Take 72 mcg by mouth Daily (before breakfast). omeprazole (PRILOSEC) 40 mg capsule Take 40 mg by mouth daily. traMADoL (ULTRAM) 50 mg tablet Take 50 mg by mouth every six (6) hours as needed for Pain. pregabalin (LYRICA) 150 mg capsule Take 150 mg by mouth two (2) times a day.  Indications: pain      famotidine (PEPCID) 40 mg tablet Take 40 mg by mouth daily. Indications: gastroesophageal reflux disease         Past History     Past Medical History:  Past Medical History:   Diagnosis Date    Anemia     Aneurysm [address]     brain    Arm vein blood clot, left     History of colon polyps     Ill-defined condition     Lupus (HCC)     Neutropenia (HCC)     Pulmonary hypertension ([facility]     Stroke [facility])        Past Surgical History:  Past Surgical History:   Procedure Laterality Date    COLONOSCOPY N/A 4/21/2021    COLONOSCOPY performed by Lisa Simpson MD at [facility] ENDOSCOPY    HX ANKLE FRACTURE TX Left     HX COLONOSCOPY         Family History:  Family History   Problem Relation Age of Onset    Heart Disease Neg Hx        Social History:  Social History     Tobacco Use    Smoking status: Never    Smokeless tobacco: Never   Vaping Use    Vaping Use: Never used   Substance Use Topics    Alcohol use: Never    Drug use: Never       Allergies: Allergies   Allergen Reactions    Lotrel [Amlodipine-Benazepril] Anaphylaxis     Tongue swelling    Sudafed [Pseudoephedrine Hcl] Anaphylaxis    Codeine Other (comments)     Pt does not recall    Doxycycline Hives         Review of Systems   Review of Systems   Constitutional:  Negative for chills and fever. Eyes:  Negative for photophobia. Respiratory:  Negative for shortness of breath. Cardiovascular:  Negative for chest pain. Gastrointestinal:  Negative for abdominal pain. Genitourinary:  Negative for dysuria. Musculoskeletal:  Positive for arthralgias and joint swelling. Negative for back pain. Neurological:  Negative for headaches. Psychiatric/Behavioral:  Negative for confusion. All other systems reviewed and are negative. Physical Exam   Physical Exam  Vitals and nursing note reviewed. Constitutional:       General: She is not in acute distress. HENT:      Head: Normocephalic and atraumatic.       Mouth/Throat:      Mouth: Mucous membranes are moist.   Eyes:      Extraocular Movements: Extraocular movements intact. Cardiovascular:      Pulses: Normal pulses. Comments: 2+ pedal pulses  Pulmonary:      Effort: Pulmonary effort is normal.   Abdominal:      Palpations: Abdomen is soft. Tenderness: There is no abdominal tenderness. Musculoskeletal:      Right lower leg: No edema. Left lower leg: No edema. Legs:    Skin:     General: Skin is warm and dry. Neurological:      General: No focal deficit present. Mental Status: She is alert. Mental status is at baseline. Motor: No weakness. Psychiatric:         Mood and Affect: Mood normal.       Diagnostic Study Results     Labs -  No results found for this or any previous visit (from the past 72 hour(s)). Radiologic Studies -   XR ANKLE LT MIN 3 V   Final Result   No acute fracture or dislocation. CT Results  (Last 48 hours)      None          CXR Results  (Last 48 hours)      None            Medical Decision Making   I am the first provider for this patient. I reviewed the vital signs, available nursing notes, past medical history, past surgical history, family history and social history. Vital Signs-Reviewed the patient's vital signs. Patient Vitals for the past 12 hrs:   Temp Pulse Resp BP SpO2   08/09/22 1539 -- -- -- 106/62 --   08/09/22 1524 -- -- -- 115/68 95 %   08/09/22 1509 -- -- -- 109/61 98 %   08/09/22 1454 -- -- -- 110/62 97 %   08/09/22 1434 98.6 °F (37 °C) 72 16 122/64 98 %         Records Reviewed: Nursing Notes and Old Medical Records    Provider Notes (Medical Decision Making):   Patient with left ankle swelling. There is no erythema, she has no systemic signs of illness. No recent manipulation or instrumentation. I doubt septic arthritis at this time. More likely inflammatory arthritis or flare of osteoarthritis. She was reassured that there was no hardware migration. Will immobilize with a walking boot, NSAIDs, follow-up in the office.     ED Course:   Initial assessment performed. The patients presenting problems have been discussed, and they are in agreement with the care plan formulated and outlined with them. I have encouraged them to ask questions as they arise throughout their visit. Subsequent to placing the boot she was observed to be walking much more comfortably. She does not need crutches or other assistive devices. We will follow-up with her primary care and referred to foot and ankle surgeon. Disposition:      The patient's results have been reviewed with Her. She has been counseled regarding her diagnosis. She verbally conveys understanding and agreement of the signs, symptoms, diagnosis, treatment, and prognosis and additionally agrees to follow up as recommended with Robbin Colon MD in 24-48 hours. She also agrees with the care-plan and conveys that all of Her questions have been answered. I have also put together some discharge instructions for Her that include: 1) educational information regarding their diagnosis, 2) how to care for their diagnosis at home, as well a 3) list of reasons why they would want to return to the ED prior to their follow-up appointment, should their condition change. DISCHARGE PLAN:  1. Discharge Medication List as of 8/9/2022  4:09 PM        2. Follow-up Information       Follow up With Specialties Details Why Contact Info    Belén Diop MD Orthopedic Surgery Schedule an appointment as soon as possible for a visit  Call for a follow up appointment. [address]   [address] [place] [address] Drive  [phone]            3. Return to ED if worse     Diagnosis     Clinical Impression:   1. Ankle effusion, left        Attestations:    Annie Cortes MD    Please note that this dictation was completed with Cre[employer], the computer voice recognition software.   Quite often unanticipated grammatical, syntax, homophones, and other interpretive errors are inadvertently transcribed by the computer software. Please disregard these errors. Please excuse any errors that have escaped final proofreading. Thank you.

## 2022-08-09 NOTE — ED TRIAGE NOTES
Pt was wheeled to the treatment area. Pt states \"I had screws placed in my left ankle years ago in the 90's and about a week ago I suddenly started having pain and swelling in the ankle. \" Pt denies injury.

## 2022-08-09 NOTE — ED NOTES
Patient discharged by provider. D/C instructions given. Patient educated to take all medications as instructed for management at home. Patien verbalized understanding, verbalized no questions. Patient ambulated out of ER without difficulty, NAD.   Patient Vitals for the past 4 hrs:   Temp Pulse Resp BP SpO2   08/09/22 1539 -- -- -- 106/62 --   08/09/22 1524 -- -- -- 115/68 95 %   08/09/22 1509 -- -- -- 109/61 98 %   08/09/22 1454 -- -- -- 110/62 97 %   08/09/22 1434 98.6 °F (37 °C) 72 16 122/64 98 %

## 2023-05-29 RX ORDER — TRAMADOL HYDROCHLORIDE 50 MG/1
50 TABLET ORAL EVERY 6 HOURS PRN
COMMUNITY

## 2023-05-29 RX ORDER — OMEPRAZOLE 40 MG/1
40 CAPSULE, DELAYED RELEASE ORAL DAILY
COMMUNITY

## 2023-05-29 RX ORDER — PREDNISONE 10 MG/1
10 TABLET ORAL
COMMUNITY
Start: 2021-11-19

## 2023-05-29 RX ORDER — ATORVASTATIN CALCIUM 80 MG/1
80 TABLET, FILM COATED ORAL DAILY
COMMUNITY
Start: 2021-11-20

## 2023-05-29 RX ORDER — FAMOTIDINE 40 MG/1
40 TABLET, FILM COATED ORAL DAILY
COMMUNITY

## 2023-05-29 RX ORDER — TRAZODONE HYDROCHLORIDE 150 MG/1
300 TABLET ORAL NIGHTLY
COMMUNITY

## 2023-05-29 RX ORDER — PREGABALIN 150 MG/1
150 CAPSULE ORAL 2 TIMES DAILY
COMMUNITY

## 2023-05-29 RX ORDER — HYDROXYCHLOROQUINE SULFATE 200 MG/1
200 TABLET, FILM COATED ORAL 2 TIMES DAILY
COMMUNITY
Start: 2021-11-19

## 2023-05-29 RX ORDER — FUROSEMIDE 40 MG/1
40 TABLET ORAL DAILY
COMMUNITY
Start: 2021-11-19

## 2023-05-29 RX ORDER — ASPIRIN 81 MG/1
81 TABLET, CHEWABLE ORAL DAILY
COMMUNITY
Start: 2021-11-20

## 2023-05-29 RX ORDER — AMLODIPINE BESYLATE 10 MG/1
10 TABLET ORAL DAILY
COMMUNITY
Start: 2021-11-20

## 2024-11-28 ENCOUNTER — APPOINTMENT (OUTPATIENT)
Facility: HOSPITAL | Age: 59
End: 2024-11-28
Payer: MEDICARE

## 2024-11-28 ENCOUNTER — HOSPITAL ENCOUNTER (EMERGENCY)
Facility: HOSPITAL | Age: 59
Discharge: HOME | End: 2024-11-28
Attending: EMERGENCY MEDICINE
Payer: MEDICARE

## 2024-11-28 VITALS
RESPIRATION RATE: 20 BRPM | OXYGEN SATURATION: 98 % | SYSTOLIC BLOOD PRESSURE: 117 MMHG | TEMPERATURE: 98.4 F | DIASTOLIC BLOOD PRESSURE: 62 MMHG | HEART RATE: 57 BPM

## 2024-11-28 DIAGNOSIS — S49.92XA INJURY OF LEFT UPPER ARM, INITIAL ENCOUNTER: ICD-10-CM

## 2024-11-28 DIAGNOSIS — S89.91XA INJURY OF RIGHT LOWER LEG, INITIAL ENCOUNTER: ICD-10-CM

## 2024-11-28 DIAGNOSIS — S09.90XA INJURY OF HEAD, INITIAL ENCOUNTER: Primary | ICD-10-CM

## 2024-11-28 LAB
APPEARANCE UR: CLEAR
BACTERIA URNS QL MICRO: ABNORMAL /HPF
BILIRUB UR QL: NEGATIVE
COLOR UR: ABNORMAL
EPITH CASTS URNS QL MICRO: ABNORMAL /LPF
GLUCOSE UR STRIP.AUTO-MCNC: NEGATIVE MG/DL
HGB UR QL STRIP: ABNORMAL
KETONES UR QL STRIP.AUTO: NEGATIVE MG/DL
LEUKOCYTE ESTERASE UR QL STRIP.AUTO: ABNORMAL
NITRITE UR QL STRIP.AUTO: NEGATIVE
PH UR STRIP: 7 (ref 5–8)
PROT UR STRIP-MCNC: NEGATIVE MG/DL
RBC #/AREA URNS HPF: ABNORMAL /HPF (ref 0–5)
SP GR UR REFRACTOMETRY: 1.01 (ref 1–1.03)
URINE CULTURE IF INDICATED: ABNORMAL
UROBILINOGEN UR QL STRIP.AUTO: 0.2 EU/DL (ref 0.2–1)
WBC URNS QL MICRO: ABNORMAL /HPF (ref 0–4)
YEAST URNS QL MICRO: PRESENT

## 2024-11-28 PROCEDURE — 81001 URINALYSIS AUTO W/SCOPE: CPT

## 2024-11-28 PROCEDURE — 70450 CT HEAD/BRAIN W/O DYE: CPT

## 2024-11-28 PROCEDURE — 6370000000 HC RX 637 (ALT 250 FOR IP): Performed by: EMERGENCY MEDICINE

## 2024-11-28 PROCEDURE — 73060 X-RAY EXAM OF HUMERUS: CPT

## 2024-11-28 PROCEDURE — 99284 EMERGENCY DEPT VISIT MOD MDM: CPT

## 2024-11-28 PROCEDURE — 73590 X-RAY EXAM OF LOWER LEG: CPT

## 2024-11-28 RX ORDER — CEPHALEXIN 500 MG/1
500 CAPSULE ORAL 3 TIMES DAILY
Qty: 15 CAPSULE | Refills: 0 | Status: SHIPPED | OUTPATIENT
Start: 2024-11-28 | End: 2024-12-03

## 2024-11-28 RX ORDER — ACETAMINOPHEN 500 MG
1000 TABLET ORAL
Status: COMPLETED | OUTPATIENT
Start: 2024-11-28 | End: 2024-11-28

## 2024-11-28 RX ADMIN — ACETAMINOPHEN 1000 MG: 500 TABLET ORAL at 16:44

## 2024-11-28 ASSESSMENT — PAIN SCALES - GENERAL
PAINLEVEL_OUTOF10: 5
PAINLEVEL_OUTOF10: 6

## 2024-11-28 ASSESSMENT — PAIN DESCRIPTION - ORIENTATION: ORIENTATION: RIGHT

## 2024-11-28 ASSESSMENT — PAIN DESCRIPTION - LOCATION
LOCATION: HEAD;ANKLE
LOCATION: ARM;LEG;HEAD

## 2024-11-28 ASSESSMENT — PAIN DESCRIPTION - FREQUENCY: FREQUENCY: CONTINUOUS

## 2024-11-28 ASSESSMENT — PAIN - FUNCTIONAL ASSESSMENT: PAIN_FUNCTIONAL_ASSESSMENT: PREVENTS OR INTERFERES SOME ACTIVE ACTIVITIES AND ADLS

## 2024-11-28 ASSESSMENT — PAIN DESCRIPTION - DESCRIPTORS
DESCRIPTORS: ACHING
DESCRIPTORS: ACHING

## 2024-11-28 ASSESSMENT — PAIN DESCRIPTION - PAIN TYPE: TYPE: ACUTE PAIN

## 2024-11-28 NOTE — ED PROVIDER NOTES
General: No swelling or deformity.      Cervical back: No rigidity.      Comments: Tenderness over her right lower shin and right ankle.  2+ pedal pulses.  Mild tenderness left proximal humerus region.  2+ radial pulse.   Skin:     General: Skin is warm and dry.   Neurological:      Mental Status: She is alert.   Psychiatric:         Mood and Affect: Mood normal.         DIAGNOSTIC RESULTS     EKG: All EKG's are interpreted by the Emergency Department Physician who either signs or Co-signs this chart in the absence of a cardiologist.        RADIOLOGY:   Non-plain film images such as CT, Ultrasound and MRI are read by the radiologist. Plain radiographic images are visualized and preliminarily interpreted by the emergency physician with the below findings:        Interpretation per the Radiologist below, if available at the time of this note:    CT Head W/O Contrast   Final Result   No acute process seen            Electronically signed by Ness Howard           LABS:  Labs Reviewed   URINALYSIS WITH REFLEX TO CULTURE - Abnormal; Notable for the following components:       Result Value    Blood, Urine SMALL (*)     Leukocyte Esterase, Urine MODERATE (*)     BACTERIA, URINE 1+ (*)     Yeast, UA PRESENT (*)     All other components within normal limits       All other labs were within normal range or not returned as of this dictation.    EMERGENCY DEPARTMENT COURSE and DIFFERENTIAL DIAGNOSIS/MDM:   Vitals:    Vitals:    11/28/24 1456 11/28/24 1500   BP: 116/68 115/66   Pulse: 58 54   Resp: 20    Temp: 98.4 °F (36.9 °C)    TempSrc: Oral    SpO2: 98% 98%           Medical Decision Making  Amount and/or Complexity of Data Reviewed  Labs: ordered.  Radiology: ordered.    Risk  OTC drugs.  Prescription drug management.            REASSESSMENT      Progress Note:  Results, treatment, and follow up plan have been discussed with patient/family.  Questions were answered.   Antonino Ramos MD  5:36

## 2024-11-28 NOTE — ED TRIAGE NOTES
Pt presents to ED per Rs with c/o slipping and falling in shower about 1300 today. Pt struck her head, There is a hematoma noted over her forehead. Pt also c/o pain in right lower leg. Pt denies any LOC or vomiting.

## (undated) DEVICE — SOLIDIFIER MEDC 1200ML -- CONVERT TO 356117

## (undated) DEVICE — BITEBLOCK ENDOSCP 60FR MAXI WHT POLYETH STURDY W/ VELC WVN

## (undated) DEVICE — CUFF BLD PRSS AD CLTH SGL TB W/ BAYNT CONN ROUNDED CORNER

## (undated) DEVICE — 1200 GUARD II KIT W/5MM TUBE W/O VAC TUBE: Brand: GUARDIAN

## (undated) DEVICE — SIMPLICITY FLUFF UNDERPAD 23X36, MODERATE: Brand: SIMPLICITY

## (undated) DEVICE — SET GRAV CK VLV NEEDLESS ST 3 GANGED 4WAY STPCOCK HI FLO 10

## (undated) DEVICE — ELECTRODE,RADIOTRANSLUCENT,FOAM,3PK: Brand: MEDLINE

## (undated) DEVICE — ADULT SPO2 SENSOR: Brand: NELLCOR

## (undated) DEVICE — CANN NASAL O2 CAPNOGRAPHY AD -- FILTERLINE

## (undated) DEVICE — KIT COLON W/ 1.1OZ LUB AND 2 END